# Patient Record
Sex: FEMALE | Race: WHITE | URBAN - METROPOLITAN AREA
[De-identification: names, ages, dates, MRNs, and addresses within clinical notes are randomized per-mention and may not be internally consistent; named-entity substitution may affect disease eponyms.]

---

## 2020-11-18 ENCOUNTER — VIRTUAL VISIT (OUTPATIENT)
Dept: INTERNAL MEDICINE | Age: 40
End: 2020-11-18

## 2020-11-18 DIAGNOSIS — Z20.822 EXPOSURE TO COVID-19 VIRUS: ICD-10-CM

## 2020-11-18 PROCEDURE — 99203 OFFICE O/P NEW LOW 30 MIN: CPT | Performed by: PHYSICIAN ASSISTANT

## 2020-11-21 LAB
SARS-COV-2: NOT DETECTED
SOURCE: NORMAL

## 2020-11-22 ASSESSMENT — ENCOUNTER SYMPTOMS
RESPIRATORY NEGATIVE: 1
SORE THROAT: 0
SINUS PRESSURE: 0
SHORTNESS OF BREATH: 0

## 2020-11-22 NOTE — PROGRESS NOTES
2020    TELEHEALTH EVALUATION -- Audio/Visual (During SBEVE-44 public health emergency)    Due to COVID 19 outbreak, patient's office visit was converted to a virtual visit. Patient was contacted and agreed to proceed with a virtual visit via [email or web address]  The risks and benefits of converting to a virtual visit were discussed in light of the current infectious disease epidemic. Patient also understood that insurance coverage and co-pays are up to their individual insurance plans. HPI:    Danielle Sera (:  1980) has requested an audio/video evaluation for the following concern(s):    Chief Complaint   Patient presents with    Concern For COVID-19       New patient, here after Covid exposure  She has been exposed at work, coworker tested positive   She had been riding in the car with her, no facemask or facial protection  Patient states no symptoms at this time      Review of Systems   Constitutional: Negative. Negative for chills, fatigue and fever. HENT: Negative. Negative for sinus pressure, sneezing and sore throat. Respiratory: Negative. Negative for shortness of breath. Musculoskeletal: Negative for myalgias. Prior to Visit Medications    Not on File       Social History     Tobacco Use    Smoking status: Current Every Day Smoker    Smokeless tobacco: Never Used   Substance Use Topics    Alcohol use: Not on file    Drug use: Not on file        Allergies not on file, History reviewed. No pertinent past medical history. , History reviewed. No pertinent surgical history. ,   Social History     Tobacco Use    Smoking status: Current Every Day Smoker    Smokeless tobacco: Never Used   Substance Use Topics    Alcohol use: Not on file    Drug use: Not on file       PHYSICAL EXAMINATION:  [ INSTRUCTIONS:  \"[x]\" Indicates a positive item  \"[]\" Indicates a negative item  -- DELETE ALL ITEMS NOT EXAMINED]  [x] Alert  [] Oriented to person/place/time    [x] No apparent distress  [] Toxic appearing    [] Face flushed appearing [] Sclera clear  [] Lips are cyanotic      [x] Breathing appears normal  [] Appears tachypneic      [] Rash on visible skin    [x] Cranial Nerves II-XII grossly intact    [x] Motor grossly intact in visible upper extremities    [x] Motor grossly intact in visible lower extremities    [x] Normal Mood  [] Anxious appearing    [] Depressed appearing  [] Confused appearing      [] Poor short term memory  [] Poor long term memory    [] OTHER:      Due to this being a TeleHealth encounter, evaluation of the following organ systems is limited: Vitals/Constitutional/EENT/Resp/CV/GI//MS/Neuro/Skin/Heme-Lymph-Imm. ASSESSMENT/PLAN:  1. Exposure to COVID-19 virus  - covid counseling   - KNZO-98 Ambulatory; Future  - f/u if needed   - quarantine as much as possible       No follow-ups on file. An  electronic signature was used to authenticate this note. --REYNALDO Magdaleno on 11/22/2020 at 1:28 PM        Pursuant to the emergency declaration under the [facility], 1135 waiver authority and the [employer] Act, this Virtual  Visit was conducted, with patient's consent, to reduce the patient's risk of exposure to COVID-19 and provide continuity of care for an established patient. Services were provided through a video synchronous discussion virtually to substitute for in-person clinic visit.

## 2021-09-14 ENCOUNTER — VIRTUAL VISIT (OUTPATIENT)
Dept: INTERNAL MEDICINE | Age: 41
End: 2021-09-14
Payer: COMMERCIAL

## 2021-09-14 DIAGNOSIS — B34.9 VIRAL ILLNESS: ICD-10-CM

## 2021-09-14 DIAGNOSIS — Z20.822 SUSPECTED COVID-19 VIRUS INFECTION: Primary | ICD-10-CM

## 2021-09-14 LAB
INFLUENZA A ANTIBODY: NORMAL
INFLUENZA B ANTIBODY: NORMAL

## 2021-09-14 PROCEDURE — 99213 OFFICE O/P EST LOW 20 MIN: CPT | Performed by: NURSE PRACTITIONER

## 2021-09-14 PROCEDURE — 87804 INFLUENZA ASSAY W/OPTIC: CPT | Performed by: NURSE PRACTITIONER

## 2021-09-14 RX ORDER — ONDANSETRON 4 MG/1
4 TABLET, ORALLY DISINTEGRATING ORAL EVERY 8 HOURS PRN
Qty: 15 TABLET | Refills: 0 | Status: SHIPPED | OUTPATIENT
Start: 2021-09-14 | End: 2021-09-19

## 2021-09-14 ASSESSMENT — ENCOUNTER SYMPTOMS
SORE THROAT: 0
BACK PAIN: 0
VOMITING: 1
RHINORRHEA: 0
WHEEZING: 0
CHEST TIGHTNESS: 0
COUGH: 1
DIARRHEA: 0
ABDOMINAL PAIN: 1
NAUSEA: 1
SHORTNESS OF BREATH: 0

## 2021-09-14 ASSESSMENT — PATIENT HEALTH QUESTIONNAIRE - PHQ9
SUM OF ALL RESPONSES TO PHQ9 QUESTIONS 1 & 2: 0
1. LITTLE INTEREST OR PLEASURE IN DOING THINGS: 0
2. FEELING DOWN, DEPRESSED OR HOPELESS: 0
SUM OF ALL RESPONSES TO PHQ QUESTIONS 1-9: 0

## 2021-09-14 NOTE — PROGRESS NOTES
Stephie Albert (:  1980) is a 36 y.o. female, Established patient, here for evaluation of the following chief complaint(s):  Concern For COVID-19 (Abdominal pain; Fatigue; x 1 day. )      No flowsheet data found. ASSESSMENT/PLAN:  1. Suspected COVID-19 virus infection  -     Covid-19 Ambulatory; Future  -     ondansetron (ZOFRAN ODT) 4 MG disintegrating tablet; Take 1 tablet by mouth every 8 hours as needed for Nausea or Vomiting, Disp-15 tablet, R-0Normal  2. Viral illness  -     POCT Influenza A/B  -     ondansetron (ZOFRAN ODT) 4 MG disintegrating tablet; Take 1 tablet by mouth every 8 hours as needed for Nausea or Vomiting, Disp-15 tablet, R-0Normal  - Self quarantine, only going out for Dr's appts/essentials  - OTC symptom control  - Continue to wear mask, social distance, and wash hands frequently  - Call 911 or go to the ER should symptoms become difficult to manage      Return if symptoms worsen or fail to improve. SUBJECTIVE/OBJECTIVE:    HPI     Symptoms started   Abd pain  Nausea  +emesis  Fatigue  +cough, but was smoker, quit x6 months ago  No shortness of breath  No runny nose  No stuffy nose  No sore throat  No headache  No loss of taste or smell  No known exposure      Review of Systems   Constitutional: Positive for fatigue. Negative for chills and fever. HENT: Negative for congestion, ear pain, rhinorrhea and sore throat. Respiratory: Positive for cough. Negative for chest tightness, shortness of breath and wheezing. Cardiovascular: Negative for chest pain, palpitations and leg swelling. Gastrointestinal: Positive for abdominal pain, nausea and vomiting. Negative for diarrhea. Genitourinary: Negative for dysuria. Musculoskeletal: Negative for arthralgias, back pain and myalgias. Neurological: Negative for dizziness, light-headedness and headaches.         No loss of taste or smell       Physical Exam  PHYSICAL EXAMINATION:  [ INSTRUCTIONS:  \"[x]\" Indicates a positive item  \"[]\" Indicates a negative item  -- DELETE ALL ITEMS NOT EXAMINED]    [x] Alert  [x] Oriented to person/place/time      [x] No apparent distress      [x] Breathing appears normal      [x] Normal Mood      [] Poor short term memory  [] Poor long term memory    [] OTHER:      Due to this being a TeleHealth encounter, evaluation of the following organ systems is limited: Vitals/Constitutional/EENT/Resp/CV/GI//MS/Neuro/Skin/Heme-Lymph-Imm. On this date 9/14/2021 I have spent 12 minutes reviewing previous notes, test results and face to face (virtual) with the patient discussing the diagnosis and importance of compliance with the treatment plan as well as documenting on the day of the visit. Sriram Andrade is a 36 y.o. female being evaluated by a Virtual Visit (video visit) encounter to address concerns as mentioned above. A caregiver was present when appropriate. Due to this being a TeleHealth encounter (During [address] emergency), evaluation of the following organ systems was limited: Vitals/Constitutional/EENT/Resp/CV/GI//MS/Neuro/Skin/Heme-Lymph-Imm. Pursuant to the emergency declaration under the [address] [place] [address] and the [employer] and [employer] Act, this Virtual Visit was conducted with patient's (and/or legal guardian's) consent, to reduce the patient's risk of exposure to COVID-19 and provide necessary medical care. The patient (and/or legal guardian) has also been advised to contact this office for worsening conditions or problems, and seek emergency medical treatment and/or call 911 if deemed necessary. Patient identification was verified at the start of the visit: Yes    Services were provided through a video synchronous discussion virtually to substitute for in-person clinic visit. Patient was located at home and provider was located in office or at home.      An electronic signature was used to authenticate this note.     --Jewel Bowers, APRN

## 2021-09-14 NOTE — PATIENT INSTRUCTIONS
Patient Education        Coronavirus (FCJBG-44): Care Instructions  Overview  The coronavirus disease (COVID-19) is caused by a virus. Symptoms may include a fever, a cough, and shortness of breath. It mainly spreads person-to-person through droplets from coughing and sneezing. The virus also can spread when people are in close contact with someone who is infected. Most people have mild symptoms and can take care of themselves at home. If their symptoms get worse, they may need care in a hospital. Treatment may include medicines to reduce symptoms, plus breathing support such as oxygen therapy or a ventilator. It's important to not spread the virus to others. If you have COVID-19, wear a face cover anytime you are around other people. It can help stop the spread of the virus. You need to isolate yourself while you are sick. Leave your home only if you need to get medical care or testing. Follow-up care is a key part of your treatment and safety. Be sure to make and go to all appointments, and call your doctor if you are having problems. It's also a good idea to know your test results and keep a list of the medicines you take. How can you care for yourself at home? · Get extra rest. It can help you feel better. · Drink plenty of fluids. This helps replace fluids lost from fever. Fluids also help ease a scratchy throat. Water, soup, fruit juice, and hot tea with lemon are good choices. · Take acetaminophen (such as Tylenol) to reduce a fever. It may also help with muscle aches. Read and follow all instructions on the label. · Use petroleum jelly on sore skin. This can help if the skin around your nose and lips becomes sore from rubbing a lot with tissues. If you use oxygen, use a water-based product instead of petroleum jelly. Tips for self-isolation  · Limit contact with people in your home. If possible, stay in a separate bedroom and use a separate bathroom.   · Wear a cloth face cover when you are around other people. It can help stop the spread of the virus when you cough or sneeze. · If you have to leave home, avoid crowds and try to stay at least 6 feet away from other people. · Avoid contact with pets and other animals. · Cover your mouth and nose with a tissue when you cough or sneeze. Then throw it in the trash right away. · Wash your hands often, especially after you cough or sneeze. Use soap and water, and scrub for at least 20 seconds. If soap and water aren't available, use an alcohol-based hand . · Don't share personal household items. These include bedding, towels, cups and glasses, and eating utensils. · [address] in the warmest water allowed for the fabric type, and dry it completely. It's okay to wash other people's laundry with yours. · Clean and disinfect your home every day. Use household  and disinfectant wipes or sprays. Take special care to clean things that you grab with your hands. These include doorknobs, remote controls, phones, and handles on your refrigerator and microwave. And don't forget countertops, tabletops, bathrooms, and computer keyboards. When you can end self-isolation  · If you know or suspect that you have COVID-19, stay in self-isolation until:  ? You haven't had a fever for 24 hours while not taking medicines to lower the fever, and  ? Your symptoms have improved, and  ? It's been at least 10 days since your symptoms started. · Talk to your doctor about whether you also need testing, especially if you have a weakened immune system. When should you call for help? Call 911 anytime you think you may need emergency care. For example, call if you have life-threatening symptoms, such as:    · You have severe trouble breathing.  (You can't talk at all.)     · You have constant chest pain or pressure.     · You are severely dizzy or lightheaded.     · You are confused or can't think clearly.     · Your face and lips have a blue color.     · You pass out (lose consciousness) or are very hard to wake up. Call your doctor now or seek immediate medical care if:    · You have moderate trouble breathing. (You can't speak a full sentence.)     · You are coughing up blood (more than about 1 teaspoon).     · You have signs of low blood pressure. These include feeling lightheaded; being too weak to stand; and having cold, pale, clammy skin. Watch closely for changes in your health, and be sure to contact your doctor if:    · Your symptoms get worse.     · You are not getting better as expected. Call before you go to the doctor's office. Follow their instructions. And wear a cloth face cover. Current as of: March 26, 2021               Content Version: 12.9  © 2006-2021 [employer]. Care instructions adapted under license by [facility] ([facility]). If you have questions about a medical condition or this instruction, always ask your healthcare professional. [place] [address] any warranty or liability for your use of this information.

## 2021-12-20 ENCOUNTER — VIRTUAL VISIT (OUTPATIENT)
Dept: INTERNAL MEDICINE | Age: 41
End: 2021-12-20
Payer: COMMERCIAL

## 2021-12-20 DIAGNOSIS — B34.9 VIRAL ILLNESS: ICD-10-CM

## 2021-12-20 DIAGNOSIS — U07.1 COVID-19: Primary | ICD-10-CM

## 2021-12-20 LAB
Lab: ABNORMAL
PERFORMING INSTRUMENT: ABNORMAL
QC PASS/FAIL: ABNORMAL
SARS-COV-2, POC: DETECTED

## 2021-12-20 PROCEDURE — G8427 DOCREV CUR MEDS BY ELIG CLIN: HCPCS | Performed by: NURSE PRACTITIONER

## 2021-12-20 PROCEDURE — 99213 OFFICE O/P EST LOW 20 MIN: CPT | Performed by: NURSE PRACTITIONER

## 2021-12-20 PROCEDURE — 87426 SARSCOV CORONAVIRUS AG IA: CPT | Performed by: NURSE PRACTITIONER

## 2021-12-20 SDOH — ECONOMIC STABILITY: FOOD INSECURITY: WITHIN THE PAST 12 MONTHS, THE FOOD YOU BOUGHT JUST DIDN'T LAST AND YOU DIDN'T HAVE MONEY TO GET MORE.: NEVER TRUE

## 2021-12-20 SDOH — ECONOMIC STABILITY: FOOD INSECURITY: WITHIN THE PAST 12 MONTHS, YOU WORRIED THAT YOUR FOOD WOULD RUN OUT BEFORE YOU GOT MONEY TO BUY MORE.: NEVER TRUE

## 2021-12-20 ASSESSMENT — ENCOUNTER SYMPTOMS
SHORTNESS OF BREATH: 1
VOMITING: 1
CHEST TIGHTNESS: 0
SORE THROAT: 0
NAUSEA: 1
DIARRHEA: 1
WHEEZING: 0
RHINORRHEA: 0
ABDOMINAL PAIN: 0
BACK PAIN: 0
COUGH: 1

## 2021-12-20 ASSESSMENT — SOCIAL DETERMINANTS OF HEALTH (SDOH): HOW HARD IS IT FOR YOU TO PAY FOR THE VERY BASICS LIKE FOOD, HOUSING, MEDICAL CARE, AND HEATING?: NOT HARD AT ALL

## 2021-12-20 NOTE — PROGRESS NOTES
Lexi Mansfield (:  1980) is a 36 y.o. female, Established patient, here for evaluation of the following chief complaint(s):  Concern For COVID-19 (loss of taste/smell, headache, body aches )      No flowsheet data found. ASSESSMENT/PLAN:  1. COVID-19  - Self quarantine, only going out for Dr's appts/essentials  - OTC symptom control  - Continue to wear mask, social distance, and wash hands frequently  - Call 911 or go to the ER should symptoms become difficult to manage  -  May RTW . Pt did not need note, office shut down d/t covid cases  2. Viral illness  -     POCT COVID-19, Antigen        Return if symptoms worsen or fail to improve. SUBJECTIVE/OBJECTIVE:    HPI    Symptoms started   Exposure to covid, a lot of co-workers out with covid  Ibuprofen with no relief        Review of Systems   Constitutional: Positive for appetite change. Negative for chills, diaphoresis, fatigue and fever. HENT: Positive for congestion. Negative for ear pain, rhinorrhea and sore throat. Respiratory: Positive for cough (productive) and shortness of breath. Negative for chest tightness and wheezing. Cardiovascular: Negative for chest pain, palpitations and leg swelling. Gastrointestinal: Positive for diarrhea, nausea and vomiting. Negative for abdominal pain. Genitourinary: Negative for dysuria. Musculoskeletal: Positive for myalgias. Negative for arthralgias and back pain. Neurological: Positive for headaches. Negative for dizziness and light-headedness.         Loss of taste and smell     Physical Exam  PHYSICAL EXAMINATION:  [ INSTRUCTIONS:  \"[x]\" Indicates a positive item  \"[]\" Indicates a negative item  -- DELETE ALL ITEMS NOT EXAMINED]    [x] Alert  [x] Oriented to person/place/time      [x] No apparent distress      [x] Breathing appears normal      [x] Normal Mood      [] Poor short term memory  [] Poor long term memory    [] OTHER:      Due to this being a TeleHealth encounter, evaluation of the following organ systems is limited: Vitals/Constitutional/EENT/Resp/CV/GI//MS/Neuro/Skin/Heme-Lymph-Imm. On this date 12/20/2021 I have spent 11 minutes reviewing previous notes, test results and face to face (virtual) with the patient discussing the diagnosis and importance of compliance with the treatment plan as well as documenting on the day of the visit. Abhijit Myrick is a 36 y.o. female being evaluated by a Virtual Visit (video visit) encounter to address concerns as mentioned above. A caregiver was present when appropriate. Due to this being a TeleHealth encounter (During CVO-17 public health emergency), evaluation of the following organ systems was limited: Vitals/Constitutional/EENT/Resp/CV/GI//MS/Neuro/Skin/Heme-Lymph-Imm. Pursuant to the emergency declaration under the [address] [place] [address], [address] [place] [address] and the [employer] and [employer] Act, this Virtual Visit was conducted with patient's (and/or legal guardian's) consent, to reduce the patient's risk of exposure to COVID-19 and provide necessary medical care. The patient (and/or legal guardian) has also been advised to contact this office for worsening conditions or problems, and seek emergency medical treatment and/or call 911 if deemed necessary. Patient identification was verified at the start of the visit: Yes    Services were provided through a video synchronous discussion virtually to substitute for in-person clinic visit. Patient was located at home and provider was located in office or at home. An electronic signature was used to authenticate this note.     --ABY Spangler

## 2021-12-20 NOTE — PATIENT INSTRUCTIONS
Patient Education        Learning About Coronavirus [phone])  What is coronavirus (COVID-19)? COVID-19 is a disease caused by a type of coronavirus. This illness was first found in December 2019. It has since spread worldwide. Coronaviruses are a large group of viruses. They cause the common cold. They also cause more serious illnesses like [place] respiratory syndrome (MERS) and severe acute respiratory syndrome (SARS). COVID-19 is caused by a novel coronavirus. That means it's a new type that has not been seen in people before. What are the symptoms? COVID-19 symptoms may include:  · Fever. · Cough. · Trouble breathing. · Chills or repeated shaking with chills. · Muscle and body aches. · Headache. · Sore throat. · New loss of taste or smell. · Vomiting. · Diarrhea. In severe cases, COVID-19 can cause pneumonia and make it hard to breathe without help from a machine. It can cause death. How is it diagnosed? COVID-19 is diagnosed with a viral test. This may also be called a PCR test or antigen test. It looks for evidence of the virus in your breathing passages or lungs (respiratory system). The test is most often done on a sample from the nose, throat, or lungs. It's sometimes done on a sample of saliva. One way a sample is collected is by putting a long swab into the back of your nose. How is it treated? Mild cases of COVID-19 can be treated at home. Serious cases need treatment in the hospital. Treatment may include medicines to reduce symptoms, plus breathing support such as oxygen therapy or a ventilator. Some people may be placed on their belly to help their oxygen levels. Treatments that may help people who have COVID-19 include:  Antiviral medicines. These medicines treat viral infections. Remdesivir is an example. Immune-based therapy. These medicines help the immune system fight COVID-19. Examples include monoclonal antibodies. Blood thinners.    These medicines help prevent blood clots. People with severe illness are at risk for blood clots. How can you protect yourself and others? The best way to protect yourself from getting sick is to:  · Get vaccinated. · Avoid sick people. · If you are not fully vaccinated:  ? Wear a mask if you have to go to public areas. ? Avoid crowds and try to stay at least 6 feet away from other people. · Cover your mouth with a tissue when you cough or sneeze. · Wash your hands often, especially after you cough or sneeze. Use soap and water, and scrub for at least 20 seconds. If soap and water aren't available, use an alcohol-based hand . · Avoid touching your mouth, nose, and eyes. To help avoid spreading the virus to others:  · Get vaccinated. · Cover your mouth with a tissue when you cough or sneeze. · Wash your hands often, especially after you cough or sneeze. Use soap and water, and scrub for at least 20 seconds. If soap and water aren't available, use an alcohol-based hand . · If you have been exposed to the virus and are not fully vaccinated:  ? Stay home. Don't go to school, work, or public areas. And don't use public transportation, ride-shares, or taxis unless you have no choice. ? Wear a mask if you have to go to public areas, like the pharmacy. · If you're sick:  ? Leave your home only if you need to get medical care. But call the doctor's office first so they know you're coming. And wear a mask. ? Wear a mask whenever you're around other people. ? Limit contact with pets and people in your home. If possible, stay in a separate bedroom and use a separate bathroom. ? Clean and disinfect your home every day. Use household  and disinfectant wipes or sprays. Take special care to clean things that you touch with your hands. How can you self-isolate when you have COVID-19? If you have COVID-19, there are things you can do to help avoid spreading the virus to others.   · Limit contact with people in your home. If possible, stay in a separate bedroom and use a separate bathroom. · Wear a mask when you are around other people. · If you have to leave home, avoid crowds and try to stay at least 6 feet away from other people. · Avoid contact with pets and other animals. · Cover your mouth and nose with a tissue when you cough or sneeze. Then throw it in the trash right away. · Wash your hands often, especially after you cough or sneeze. Use soap and water, and scrub for at least 20 seconds. If soap and water aren't available, use an alcohol-based hand . · Don't share personal household items. These include bedding, towels, cups and glasses, and eating utensils. · [address] in the warmest water allowed for the fabric type, and dry it completely. It's okay to wash other people's laundry with yours. · Clean and disinfect your home. Use household  and disinfectant wipes or sprays. When should you call for help? Call 911 anytime you think you may need emergency care. For example, call if you have life-threatening symptoms, such as:    · You have severe trouble breathing. (You can't talk at all.)     · You have constant chest pain or pressure.     · You are severely dizzy or lightheaded.     · You are confused or can't think clearly.     · You have pale, gray, or blue-colored skin or lips.     · You pass out (lose consciousness) or are very hard to wake up. Call your doctor now or seek immediate medical care if:    · You have moderate trouble breathing. (You can't speak a full sentence.)     · You are coughing up blood (more than about 1 teaspoon).     · You have signs of low blood pressure. These include feeling lightheaded; being too weak to stand; and having cold, pale, clammy skin.    Watch closely for changes in your health, and be sure to contact your doctor if:    · Your symptoms get worse.     · You are not getting better as expected.     · You have new or worse symptoms of anxiety, depression, nightmares, or flashbacks. Call before you go to the doctor's office. Follow their instructions. And wear a mask. Current as of: July 1, 2021               Content Version: 13.0  © 2006-2021 [employer]. Care instructions adapted under license by [facility] ([facility]). If you have questions about a medical condition or this instruction, always ask your healthcare professional. [place] [address] any warranty or liability for your use of this information. Patient Education        Coronavirus (HHQJQ-20): Care Instructions  Overview  The coronavirus disease (COVID-19) is caused by a virus. Symptoms may include a fever, a cough, and shortness of breath. It can spread through droplets from coughing and sneezing, breathing, and singing. The virus also can spread when people are in close contact with someone who is infected. Most people have mild symptoms and can take care of themselves at home. If their symptoms get worse, they may need care in a hospital. Treatment may include medicines to reduce symptoms, plus breathing support such as oxygen therapy or a ventilator. It's important to not spread the virus to others. If you have COVID-19, wear a mask anytime you are around other people. It can help stop the spread of the virus. You need to isolate yourself while you are sick. Leave your home only if you need to get medical care or testing. Follow-up care is a key part of your treatment and safety. Be sure to make and go to all appointments, and call your doctor if you are having problems. It's also a good idea to know your test results and keep a list of the medicines you take. How can you care for yourself at home? · Get extra rest. It can help you feel better. · Drink plenty of fluids. This helps replace fluids lost from fever. Fluids may also help ease a scratchy throat. · You can take acetaminophen (Tylenol) or ibuprofen (Advil, Motrin) to reduce a fever.  It may also help with muscle and body aches. Read and follow all instructions on the label. · Use petroleum jelly on sore skin. This can help if the skin around your nose and lips becomes sore from rubbing a lot with tissues. If you use oxygen, use a water-based product instead of petroleum jelly. · Keep track of symptoms such as fever and shortness of breath. This can help you know if you need to call your doctor. It can also help you know when it's safe to be around other people. · In some cases, your doctor might suggest that you get a pulse oximeter. How can you self-isolate when you have COVID-19? If you have COVID-19, there are things you can do to help avoid spreading the virus to others. · Limit contact with people in your home. If possible, stay in a separate bedroom and use a separate bathroom. · Wear a mask when you are around other people. · If you have to leave home, avoid crowds and try to stay at least 6 feet away from other people. · Avoid contact with pets and other animals. · Cover your mouth and nose with a tissue when you cough or sneeze. Then throw it in the trash right away. · Wash your hands often, especially after you cough or sneeze. Use soap and water, and scrub for at least 20 seconds. If soap and water aren't available, use an alcohol-based hand . · Don't share personal household items. These include bedding, towels, cups and glasses, and eating utensils. · [address] in the warmest water allowed for the fabric type, and dry it completely. It's okay to wash other people's laundry with yours. · Clean and disinfect your home. Use household  and disinfectant wipes or sprays. When can you end self-isolation for COVID-19? If you know or think that you have the virus, you will need to self-isolate.  You can be around others after:  · It's been at least 10 days since your symptoms started and  · You haven't had a fever for 24 hours without taking medicines to lower the fever and  · Your symptoms are improving. If you tested positive but have no symptoms, you can end isolation after 10 days. But if you start to have symptoms, follow the steps above. Ask your doctor if you need to be tested before you end isolation. This is especially important if you have a weakened immune system. When should you call for help? Call 911 anytime you think you may need emergency care. For example, call if you have life-threatening symptoms, such as:    · You have severe trouble breathing. (You can't talk at all.)     · You have constant chest pain or pressure.     · You are severely dizzy or lightheaded.     · You are confused or can't think clearly.     · You have pale, gray, or blue-colored skin or lips.     · You pass out (lose consciousness) or are very hard to wake up. Call your doctor now or seek immediate medical care if:    · You have moderate trouble breathing. (You can't speak a full sentence.)     · You are coughing up blood (more than about 1 teaspoon).     · You have signs of low blood pressure. These include feeling lightheaded; being too weak to stand; and having cold, pale, clammy skin. Watch closely for changes in your health, and be sure to contact your doctor if:    · Your symptoms get worse.     · You are not getting better as expected.     · You have new or worse symptoms of anxiety, depression, nightmares, or flashbacks. Call before you go to the doctor's office. Follow their instructions. And wear a mask. Current as of: July 1, 2021               Content Version: 13.0  © 2006-2021 [employer]. Care instructions adapted under license by [facility] ([facility]). If you have questions about a medical condition or this instruction, always ask your healthcare professional. [place] [address] any warranty or liability for your use of this information.

## 2024-04-20 ENCOUNTER — HOSPITAL ENCOUNTER (INPATIENT)
Age: 44
LOS: 10 days | Discharge: HOME | DRG: 885 | End: 2024-04-30
Attending: PSYCHIATRY & NEUROLOGY | Admitting: PSYCHIATRY & NEUROLOGY
Payer: COMMERCIAL

## 2024-04-20 DIAGNOSIS — E05.90 HYPERTHYROIDISM: ICD-10-CM

## 2024-04-20 DIAGNOSIS — F32.2 CURRENT SEVERE EPISODE OF MAJOR DEPRESSIVE DISORDER WITHOUT PSYCHOTIC FEATURES WITHOUT PRIOR EPISODE (HCC): Primary | ICD-10-CM

## 2024-04-20 PROBLEM — F32.A DEPRESSION, UNSPECIFIED: Status: ACTIVE | Noted: 2024-04-20

## 2024-04-20 LAB
ALBUMIN SERPL-MCNC: 4.4 G/DL (ref 3.5–4.6)
ALP SERPL-CCNC: 108 U/L (ref 40–130)
ALT SERPL-CCNC: 29 U/L (ref 0–33)
AMPHET UR QL SCN: ABNORMAL
ANION GAP SERPL CALCULATED.3IONS-SCNC: 20 MEQ/L (ref 9–15)
APAP SERPL-MCNC: <5 UG/ML (ref 10–30)
AST SERPL-CCNC: 19 U/L (ref 0–35)
BARBITURATES UR QL SCN: ABNORMAL
BASOPHILS # BLD: 0 K/UL (ref 0–0.2)
BASOPHILS NFR BLD: 0.2 %
BENZODIAZ UR QL SCN: ABNORMAL
BILIRUB SERPL-MCNC: 0.3 MG/DL (ref 0.2–0.7)
BUN SERPL-MCNC: 12 MG/DL (ref 6–20)
CALCIUM SERPL-MCNC: 9.6 MG/DL (ref 8.5–9.9)
CANNABINOIDS UR QL SCN: POSITIVE
CHLORIDE SERPL-SCNC: 105 MEQ/L (ref 95–107)
CHOLEST SERPL-MCNC: 177 MG/DL (ref 0–199)
CK SERPL-CCNC: 54 U/L (ref 0–170)
CO2 SERPL-SCNC: 16 MEQ/L (ref 20–31)
COCAINE UR QL SCN: ABNORMAL
CREAT SERPL-MCNC: 0.3 MG/DL (ref 0.5–0.9)
DRUG SCREEN COMMENT UR-IMP: ABNORMAL
EOSINOPHIL # BLD: 0 K/UL (ref 0–0.7)
EOSINOPHIL NFR BLD: 0.1 %
ERYTHROCYTE [DISTWIDTH] IN BLOOD BY AUTOMATED COUNT: 13.1 % (ref 11.5–14.5)
ETHANOL PERCENT: 0.03 G/DL
ETHANOLAMINE SERPL-MCNC: 34 MG/DL (ref 0–0.08)
FENTANYL SCREEN, URINE: ABNORMAL
GLOBULIN SER CALC-MCNC: 3.5 G/DL (ref 2.3–3.5)
GLUCOSE SERPL-MCNC: 109 MG/DL (ref 70–99)
HCG UR QL: NEGATIVE
HCT VFR BLD AUTO: 42.7 % (ref 37–47)
HDLC SERPL-MCNC: 68 MG/DL (ref 40–59)
HGB BLD-MCNC: 14.3 G/DL (ref 12–16)
LDLC SERPL CALC-MCNC: 92 MG/DL (ref 0–129)
LYMPHOCYTES # BLD: 2 K/UL (ref 1–4.8)
LYMPHOCYTES NFR BLD: 21.3 %
MCH RBC QN AUTO: 25.8 PG (ref 27–31.3)
MCHC RBC AUTO-ENTMCNC: 33.5 % (ref 33–37)
MCV RBC AUTO: 77.1 FL (ref 79.4–94.8)
METHADONE UR QL SCN: ABNORMAL
MONOCYTES # BLD: 0.4 K/UL (ref 0.2–0.8)
MONOCYTES NFR BLD: 4.8 %
NEUTROPHILS # BLD: 6.7 K/UL (ref 1.4–6.5)
NEUTS SEG NFR BLD: 73.4 %
OPIATES UR QL SCN: ABNORMAL
OXYCODONE UR QL SCN: ABNORMAL
PCP UR QL SCN: ABNORMAL
PLATELET # BLD AUTO: 302 K/UL (ref 130–400)
POTASSIUM SERPL-SCNC: 4.4 MEQ/L (ref 3.4–4.9)
PROPOXYPH UR QL SCN: ABNORMAL
PROT SERPL-MCNC: 7.9 G/DL (ref 6.3–8)
RBC # BLD AUTO: 5.54 M/UL (ref 4.2–5.4)
SALICYLATES SERPL-MCNC: <0.3 MG/DL (ref 15–30)
SODIUM SERPL-SCNC: 141 MEQ/L (ref 135–144)
TRIGL SERPL-MCNC: 83 MG/DL (ref 0–150)
TSH SERPL-MCNC: <0.01 UIU/ML (ref 0.44–3.86)
WBC # BLD AUTO: 9.1 K/UL (ref 4.8–10.8)

## 2024-04-20 PROCEDURE — 6370000000 HC RX 637 (ALT 250 FOR IP): Performed by: PHYSICIAN ASSISTANT

## 2024-04-20 PROCEDURE — 80143 DRUG ASSAY ACETAMINOPHEN: CPT

## 2024-04-20 PROCEDURE — 80307 DRUG TEST PRSMV CHEM ANLYZR: CPT

## 2024-04-20 PROCEDURE — 1240000000 HC EMOTIONAL WELLNESS R&B

## 2024-04-20 PROCEDURE — 82077 ASSAY SPEC XCP UR&BREATH IA: CPT

## 2024-04-20 PROCEDURE — 84443 ASSAY THYROID STIM HORMONE: CPT

## 2024-04-20 PROCEDURE — 36415 COLL VENOUS BLD VENIPUNCTURE: CPT

## 2024-04-20 PROCEDURE — 80061 LIPID PANEL: CPT

## 2024-04-20 PROCEDURE — 80179 DRUG ASSAY SALICYLATE: CPT

## 2024-04-20 PROCEDURE — 84703 CHORIONIC GONADOTROPIN ASSAY: CPT

## 2024-04-20 PROCEDURE — 82550 ASSAY OF CK (CPK): CPT

## 2024-04-20 PROCEDURE — 83036 HEMOGLOBIN GLYCOSYLATED A1C: CPT

## 2024-04-20 PROCEDURE — 85025 COMPLETE CBC W/AUTO DIFF WBC: CPT

## 2024-04-20 PROCEDURE — 99285 EMERGENCY DEPT VISIT HI MDM: CPT

## 2024-04-20 PROCEDURE — 6370000000 HC RX 637 (ALT 250 FOR IP): Performed by: PSYCHIATRY & NEUROLOGY

## 2024-04-20 PROCEDURE — 80053 COMPREHEN METABOLIC PANEL: CPT

## 2024-04-20 RX ORDER — MAGNESIUM HYDROXIDE/ALUMINUM HYDROXICE/SIMETHICONE 120; 1200; 1200 MG/30ML; MG/30ML; MG/30ML
30 SUSPENSION ORAL PRN
Status: DISCONTINUED | OUTPATIENT
Start: 2024-04-20 | End: 2024-04-30 | Stop reason: HOSPADM

## 2024-04-20 RX ORDER — HYDROXYZINE HYDROCHLORIDE 50 MG/ML
50 INJECTION, SOLUTION INTRAMUSCULAR EVERY 6 HOURS PRN
Status: DISCONTINUED | OUTPATIENT
Start: 2024-04-20 | End: 2024-04-30 | Stop reason: HOSPADM

## 2024-04-20 RX ORDER — HALOPERIDOL 5 MG/ML
5 INJECTION INTRAMUSCULAR EVERY 6 HOURS PRN
Status: DISCONTINUED | OUTPATIENT
Start: 2024-04-20 | End: 2024-04-30 | Stop reason: HOSPADM

## 2024-04-20 RX ORDER — HALOPERIDOL 5 MG/1
5 TABLET ORAL EVERY 6 HOURS PRN
Status: DISCONTINUED | OUTPATIENT
Start: 2024-04-20 | End: 2024-04-30 | Stop reason: HOSPADM

## 2024-04-20 RX ORDER — ACETAMINOPHEN 325 MG/1
650 TABLET ORAL EVERY 4 HOURS PRN
Status: DISCONTINUED | OUTPATIENT
Start: 2024-04-20 | End: 2024-04-30 | Stop reason: HOSPADM

## 2024-04-20 RX ORDER — TRAZODONE HYDROCHLORIDE 50 MG/1
50 TABLET ORAL NIGHTLY PRN
Status: DISCONTINUED | OUTPATIENT
Start: 2024-04-20 | End: 2024-04-30 | Stop reason: HOSPADM

## 2024-04-20 RX ORDER — BENZTROPINE MESYLATE 1 MG/ML
2 INJECTION INTRAMUSCULAR; INTRAVENOUS 2 TIMES DAILY PRN
Status: DISCONTINUED | OUTPATIENT
Start: 2024-04-20 | End: 2024-04-30 | Stop reason: HOSPADM

## 2024-04-20 RX ORDER — ACETAMINOPHEN 325 MG/1
650 TABLET ORAL ONCE
Status: COMPLETED | OUTPATIENT
Start: 2024-04-20 | End: 2024-04-20

## 2024-04-20 RX ORDER — HYDROXYZINE PAMOATE 50 MG/1
50 CAPSULE ORAL EVERY 6 HOURS PRN
Status: DISCONTINUED | OUTPATIENT
Start: 2024-04-20 | End: 2024-04-30 | Stop reason: HOSPADM

## 2024-04-20 RX ADMIN — HYDROXYZINE PAMOATE 50 MG: 50 CAPSULE ORAL at 14:47

## 2024-04-20 RX ADMIN — ACETAMINOPHEN 650 MG: 325 TABLET ORAL at 09:30

## 2024-04-20 RX ADMIN — TRAZODONE HYDROCHLORIDE 50 MG: 50 TABLET ORAL at 21:10

## 2024-04-20 ASSESSMENT — PATIENT HEALTH QUESTIONNAIRE - PHQ9
SUM OF ALL RESPONSES TO PHQ QUESTIONS 1-9: 27
SUM OF ALL RESPONSES TO PHQ QUESTIONS 1-9: 24
SUM OF ALL RESPONSES TO PHQ9 QUESTIONS 1 & 2: 6
3. TROUBLE FALLING OR STAYING ASLEEP: NEARLY EVERY DAY
5. POOR APPETITE OR OVEREATING: NEARLY EVERY DAY
4. FEELING TIRED OR HAVING LITTLE ENERGY: NEARLY EVERY DAY
1. LITTLE INTEREST OR PLEASURE IN DOING THINGS: NEARLY EVERY DAY
SUM OF ALL RESPONSES TO PHQ QUESTIONS 1-9: 27
8. MOVING OR SPEAKING SO SLOWLY THAT OTHER PEOPLE COULD HAVE NOTICED. OR THE OPPOSITE, BEING SO FIGETY OR RESTLESS THAT YOU HAVE BEEN MOVING AROUND A LOT MORE THAN USUAL: NEARLY EVERY DAY
9. THOUGHTS THAT YOU WOULD BE BETTER OFF DEAD, OR OF HURTING YOURSELF: NEARLY EVERY DAY
7. TROUBLE CONCENTRATING ON THINGS, SUCH AS READING THE NEWSPAPER OR WATCHING TELEVISION: NEARLY EVERY DAY
SUM OF ALL RESPONSES TO PHQ QUESTIONS 1-9: 27
6. FEELING BAD ABOUT YOURSELF - OR THAT YOU ARE A FAILURE OR HAVE LET YOURSELF OR YOUR FAMILY DOWN: NEARLY EVERY DAY
2. FEELING DOWN, DEPRESSED OR HOPELESS: NEARLY EVERY DAY
10. IF YOU CHECKED OFF ANY PROBLEMS, HOW DIFFICULT HAVE THESE PROBLEMS MADE IT FOR YOU TO DO YOUR WORK, TAKE CARE OF THINGS AT HOME, OR GET ALONG WITH OTHER PEOPLE: EXTREMELY DIFFICULT

## 2024-04-20 ASSESSMENT — PAIN - FUNCTIONAL ASSESSMENT
PAIN_FUNCTIONAL_ASSESSMENT: NONE - DENIES PAIN
PAIN_FUNCTIONAL_ASSESSMENT: NONE - DENIES PAIN

## 2024-04-20 ASSESSMENT — PAIN DESCRIPTION - LOCATION: LOCATION: HEAD

## 2024-04-20 ASSESSMENT — LIFESTYLE VARIABLES
HOW MANY STANDARD DRINKS CONTAINING ALCOHOL DO YOU HAVE ON A TYPICAL DAY: 7 TO 9
HOW OFTEN DO YOU HAVE A DRINK CONTAINING ALCOHOL: 2-3 TIMES A WEEK

## 2024-04-20 ASSESSMENT — SLEEP AND FATIGUE QUESTIONNAIRES
AVERAGE NUMBER OF SLEEP HOURS: 3
DO YOU HAVE DIFFICULTY SLEEPING: YES
SLEEP PATTERN: DIFFICULTY FALLING ASLEEP;DISTURBED/INTERRUPTED SLEEP;NIGHTMARES/TERRORS;INSOMNIA
DO YOU USE A SLEEP AID: YES

## 2024-04-20 ASSESSMENT — PAIN SCALES - GENERAL: PAINLEVEL_OUTOF10: 5

## 2024-04-20 NOTE — ED TRIAGE NOTES
Pt reports she is currently SI with a plan of \"shooting myself in the head \"  -denies any chest pain - denies SOB - denies N&V - Ax4 - denies numbness tingling - pt states she was locked in the room with her gun and wrote a suicide note - pt states she changed her mid  and went for a walk -  pulled up and took her to her house and spoke to her boyfriend and EMS was called - pt requesting \"help\"

## 2024-04-20 NOTE — ED PROVIDER NOTES
[facility] ED  eMERGENCY dEPARTMENTeNCOUnter      Pt Name: Shrtuhi Patel  MRN: [id number]  Birthdate 1980  Date ofevaluation: 4/20/2024  Provider: Lucia Fonseca PA-C    CHIEF COMPLAINT       Chief Complaint   Patient presents with    Suicidal     Pt reports she is currently SI with a plan of \"shooting myself in the head \"  -denies any chest pain - denies SOB - denies N&V - Ax4 - denies numbness tingling - pt states she was locked in the room with her gun and wrote a suicide note - pt states she changed her mid  and went for a walk -  pulled up and took her to her house and spoke to her boyfriend and EMS was called - pt requesting \"help\"          HISTORY OF PRESENT ILLNESS   (Location/Symptom, Timing/Onset,Context/Setting, Quality, Duration, Modifying Factors, Severity)  Note limiting factors.   Shruthi Patel is a 43 y.o. female who presents to the emergency department suicidal ideation.  Patient comes from home no diagnosed psychiatric history but feels that she has underlying depression and anxiety.  Last night she had a gun to her head wanting to kill herself after she heard her significant other and daughter to poorly about her.  She even wrote a note.  She ended up walking away and then family called when they went into the room and see the note.  No known psychiatric history.  Patient states she does feel anxious and shaky.  She does endorse having 2 alcoholic beverages last night.  She has no medical complaints today.    HPI    NursingNotes were reviewed.    REVIEW OF SYSTEMS    (2-9 systems for level 4, 10 or more for level 5)     Review of Systems   Constitutional:  Negative for chills, diaphoresis, fatigue and fever.   HENT:  Negative for congestion, rhinorrhea and sore throat.    Eyes:  Negative for photophobia and pain.   Respiratory:  Negative for cough and shortness of breath.    Cardiovascular:  Negative for chest pain and palpitations.   Gastrointestinal:  Negative for
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
## 2024-04-21 PROBLEM — F32.A DEPRESSION, UNSPECIFIED: Status: RESOLVED | Noted: 2024-04-20 | Resolved: 2024-04-21

## 2024-04-21 PROBLEM — F31.63 SEVERE MIXED BIPOLAR DISORDER (HCC): Status: ACTIVE | Noted: 2024-04-21

## 2024-04-21 LAB
ESTIMATED AVERAGE GLUCOSE: 103 MG/DL
HBA1C MFR BLD: 5.2 % (ref 4–6)
T3 FREE: 22.7 PG/ML (ref 2–4.4)
T4 FREE SERPL-MCNC: 6.17 NG/DL (ref 0.84–1.68)

## 2024-04-21 PROCEDURE — 84439 ASSAY OF FREE THYROXINE: CPT

## 2024-04-21 PROCEDURE — 36415 COLL VENOUS BLD VENIPUNCTURE: CPT

## 2024-04-21 PROCEDURE — 6370000000 HC RX 637 (ALT 250 FOR IP): Performed by: NURSE PRACTITIONER

## 2024-04-21 PROCEDURE — 1240000000 HC EMOTIONAL WELLNESS R&B

## 2024-04-21 PROCEDURE — 6370000000 HC RX 637 (ALT 250 FOR IP): Performed by: PSYCHIATRY & NEUROLOGY

## 2024-04-21 PROCEDURE — 93005 ELECTROCARDIOGRAM TRACING: CPT | Performed by: PSYCHIATRY & NEUROLOGY

## 2024-04-21 PROCEDURE — 84481 FREE ASSAY (FT-3): CPT

## 2024-04-21 RX ORDER — PRAZOSIN HYDROCHLORIDE 1 MG/1
1 CAPSULE ORAL NIGHTLY
Status: DISCONTINUED | OUTPATIENT
Start: 2024-04-21 | End: 2024-04-27

## 2024-04-21 RX ORDER — DIVALPROEX SODIUM 250 MG/1
250 TABLET, DELAYED RELEASE ORAL EVERY 12 HOURS SCHEDULED
Status: DISCONTINUED | OUTPATIENT
Start: 2024-04-21 | End: 2024-04-26

## 2024-04-21 RX ORDER — OLANZAPINE 5 MG/1
5 TABLET ORAL NIGHTLY
Status: DISCONTINUED | OUTPATIENT
Start: 2024-04-21 | End: 2024-04-30 | Stop reason: HOSPADM

## 2024-04-21 RX ADMIN — PRAZOSIN HYDROCHLORIDE 1 MG: 1 CAPSULE ORAL at 21:05

## 2024-04-21 RX ADMIN — ACETAMINOPHEN 650 MG: 325 TABLET ORAL at 18:34

## 2024-04-21 RX ADMIN — TRAZODONE HYDROCHLORIDE 50 MG: 50 TABLET ORAL at 21:05

## 2024-04-21 RX ADMIN — DIVALPROEX SODIUM 250 MG: 250 TABLET, DELAYED RELEASE ORAL at 21:05

## 2024-04-21 RX ADMIN — OLANZAPINE 5 MG: 5 TABLET, FILM COATED ORAL at 21:05
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
## 2024-04-22 ENCOUNTER — APPOINTMENT (OUTPATIENT)
Dept: ULTRASOUND IMAGING | Age: 44
DRG: 885 | End: 2024-04-22
Payer: COMMERCIAL

## 2024-04-22 PROBLEM — E05.90 HYPERTHYROIDISM: Status: ACTIVE | Noted: 2024-04-22

## 2024-04-22 LAB
EKG ATRIAL RATE: 84 BPM
EKG P AXIS: 47 DEGREES
EKG P-R INTERVAL: 118 MS
EKG Q-T INTERVAL: 382 MS
EKG QRS DURATION: 84 MS
EKG QTC CALCULATION (BAZETT): 451 MS
EKG R AXIS: 75 DEGREES
EKG T AXIS: 58 DEGREES
EKG VENTRICULAR RATE: 84 BPM

## 2024-04-22 PROCEDURE — 6370000000 HC RX 637 (ALT 250 FOR IP)

## 2024-04-22 PROCEDURE — 84445 ASSAY OF TSI GLOBULIN: CPT

## 2024-04-22 PROCEDURE — 99233 SBSQ HOSP IP/OBS HIGH 50: CPT | Performed by: PSYCHIATRY & NEUROLOGY

## 2024-04-22 PROCEDURE — 6370000000 HC RX 637 (ALT 250 FOR IP): Performed by: PSYCHIATRY & NEUROLOGY

## 2024-04-22 PROCEDURE — 6370000000 HC RX 637 (ALT 250 FOR IP): Performed by: INTERNAL MEDICINE

## 2024-04-22 PROCEDURE — 83520 IMMUNOASSAY QUANT NOS NONAB: CPT

## 2024-04-22 PROCEDURE — 99222 1ST HOSP IP/OBS MODERATE 55: CPT | Performed by: INTERNAL MEDICINE

## 2024-04-22 PROCEDURE — 1240000000 HC EMOTIONAL WELLNESS R&B

## 2024-04-22 PROCEDURE — 76536 US EXAM OF HEAD AND NECK: CPT

## 2024-04-22 PROCEDURE — 6370000000 HC RX 637 (ALT 250 FOR IP): Performed by: NURSE PRACTITIONER

## 2024-04-22 PROCEDURE — 86376 MICROSOMAL ANTIBODY EACH: CPT

## 2024-04-22 RX ORDER — PROPRANOLOL HCL 60 MG
60 CAPSULE, EXTENDED RELEASE 24HR ORAL 2 TIMES DAILY
Status: DISCONTINUED | OUTPATIENT
Start: 2024-04-22 | End: 2024-04-30 | Stop reason: HOSPADM

## 2024-04-22 RX ORDER — METHIMAZOLE 10 MG/1
20 TABLET ORAL 3 TIMES DAILY
Status: DISCONTINUED | OUTPATIENT
Start: 2024-04-22 | End: 2024-04-30 | Stop reason: HOSPADM

## 2024-04-22 RX ADMIN — PROPRANOLOL HYDROCHLORIDE 60 MG: 60 CAPSULE, EXTENDED RELEASE ORAL at 20:57

## 2024-04-22 RX ADMIN — PROPRANOLOL HYDROCHLORIDE 60 MG: 60 CAPSULE, EXTENDED RELEASE ORAL at 13:41

## 2024-04-22 RX ADMIN — METHIMAZOLE 20 MG: 10 TABLET ORAL at 13:31

## 2024-04-22 RX ADMIN — DIVALPROEX SODIUM 250 MG: 250 TABLET, DELAYED RELEASE ORAL at 20:57

## 2024-04-22 RX ADMIN — HYPROMELLOSE 2910 1 DROP: 5 SOLUTION/ DROPS OPHTHALMIC at 19:18

## 2024-04-22 RX ADMIN — OLANZAPINE 5 MG: 5 TABLET, FILM COATED ORAL at 20:58

## 2024-04-22 RX ADMIN — PRAZOSIN HYDROCHLORIDE 1 MG: 1 CAPSULE ORAL at 21:03

## 2024-04-22 RX ADMIN — DIVALPROEX SODIUM 250 MG: 250 TABLET, DELAYED RELEASE ORAL at 09:07

## 2024-04-22 RX ADMIN — METHIMAZOLE 20 MG: 10 TABLET ORAL at 20:57

## 2024-04-22 RX ADMIN — HYPROMELLOSE 2910 1 DROP: 5 SOLUTION/ DROPS OPHTHALMIC at 14:41

## 2024-04-22 RX ADMIN — HYDROXYZINE PAMOATE 50 MG: 50 CAPSULE ORAL at 12:04

## 2024-04-22 RX ADMIN — HYDROXYZINE PAMOATE 50 MG: 50 CAPSULE ORAL at 19:18

## 2024-04-22 RX ADMIN — TRAZODONE HYDROCHLORIDE 50 MG: 50 TABLET ORAL at 20:57

## 2024-04-22 NOTE — CONSULTS
[facility]                   [address] [place] [address]                              CONSULTATION      PATIENT NAME: LENNY ADAMES                : 1980  MED REC NO: [id number]                        ROOM: [id number]  ACCOUNT NO: [id number]                       ADMIT DATE: 2024  PROVIDER: Declan Hill MD    ENDOCRINE CONSULT    REFERRING PHYSICIAN:  Vianney Swan      REASON FOR CONSULT:  Hyperthyroidism.    CHIEF COMPLAINT AND HISTORY OF PRESENT ILLNESS:  The patient is a 43-year-old female with known history of prior hypo or hyperthyroidism, admitted with depression and suicidal ideation.  The patient was planning to shoot herself in the head.  She was locked in a room with a gun planning to kill herself, then changed her mind.  The patient had labs done for thyroid function test showing significantly elevated free T4 of 6.17.  TSH was suppressed.  Free T3 was also elevated at 22.7.  The patient does have classic signs and symptoms of hyperthyroidism including tremors, palpitation, difficulty sleeping, anxiety, and 70-pound weight loss.  Also, has noted some bulging of her eyes.  Symptoms started close to a year ago.  No family history of thyroid.  The patient was adopted.  Reviewed the chemistries.  Sodium 141, potassium 4.4, chloride 105, CO2 was 16, BUN 12, creatinine was 1.03.  Drug screen positive for cannabinoids.  Hemoglobin was 14.3, WBC count 9.1.    PAST MEDICAL HISTORY:  Significant for anxiety, depression.    SURGICAL HISTORY:  Reviewed, noncontributory.    FAMILY HISTORY:  Unavailable due to patient adopted.    PERSONAL AND SOCIAL HISTORY:  Does smoke cigarettes.    ALLERGIES:  NONE.      MEDICATIONS:  Here include Depakote, Zyprexa, Minipress.    REVIEW OF SYSTEMS:  Other than depression, suicidal ideations and thought, weight loss, symptoms of hyperthyroidism, 14-point review of systems negative.    PHYSICAL EXAMINATION:  GENERAL:  The patient is 
to display       ASSESSMENT AND PLAN    Severe mixed bipolar disorder  Dr. Doran manage    Generalized anxiety and depression  emotional support has been provided  encourage participation in rehabilitation support group and recreational therapy    Tachycardia  Secondary to anxiety versus thyroid insufficiency  TSH < 0.010  HR ranges from 105-120  Patient reports increased anxiety, distractibility, feeling overwhelmed.  She also reports using eyedrops to reduce discomfort.  Positive for orbitopathy.  Lab work: Free T4 and T3  VS per unit routine  Eyedrops - artificial tears as needed  Consult to endocrinology    VTE Prophylaxis: Low risk for DVT    Plan of care discussed and agreed upon with: patient    Additional work up or/and treatment plan may be added today or then after based on clinical progression. I am managing a portion of pt care. Some medical issues are handled by other specialists. Additional work up and treatment should be done in out pt setting by pt PCP and other out pt providers.      In addition to examining and evaluating pt, I spent additional time explaining care, normal/abnormal findings and treatment plan, reviewing patient's chart and adjusting/ reconciling medications. All of pt questions were answered. Counseling, diet and education were  provided. Case will be discussed with nursing staff when appropriate. Family will be updated if and when appropriate.    SIGNATURE: Vianney Andersen, ABY - CNP  DATE: April 21, 2024  TIME: 3:37 PM     Andrey Lance MD - supervising physician

## 2024-04-23 LAB — THYROPEROXIDASE IGG SERPL-ACNC: 686 IU/ML (ref 0–25)

## 2024-04-23 PROCEDURE — 6370000000 HC RX 637 (ALT 250 FOR IP): Performed by: INTERNAL MEDICINE

## 2024-04-23 PROCEDURE — 99232 SBSQ HOSP IP/OBS MODERATE 35: CPT | Performed by: PSYCHIATRY & NEUROLOGY

## 2024-04-23 PROCEDURE — 1240000000 HC EMOTIONAL WELLNESS R&B

## 2024-04-23 PROCEDURE — 6370000000 HC RX 637 (ALT 250 FOR IP): Performed by: NURSE PRACTITIONER

## 2024-04-23 PROCEDURE — 6370000000 HC RX 637 (ALT 250 FOR IP): Performed by: PSYCHIATRY & NEUROLOGY

## 2024-04-23 RX ADMIN — TRAZODONE HYDROCHLORIDE 50 MG: 50 TABLET ORAL at 21:42

## 2024-04-23 RX ADMIN — METHIMAZOLE 20 MG: 10 TABLET ORAL at 21:26

## 2024-04-23 RX ADMIN — HYPROMELLOSE 2910 1 DROP: 5 SOLUTION/ DROPS OPHTHALMIC at 09:18

## 2024-04-23 RX ADMIN — OLANZAPINE 5 MG: 5 TABLET, FILM COATED ORAL at 21:26

## 2024-04-23 RX ADMIN — METHIMAZOLE 20 MG: 10 TABLET ORAL at 13:40

## 2024-04-23 RX ADMIN — HYDROXYZINE PAMOATE 50 MG: 50 CAPSULE ORAL at 09:19

## 2024-04-23 RX ADMIN — PROPRANOLOL HYDROCHLORIDE 60 MG: 60 CAPSULE, EXTENDED RELEASE ORAL at 08:05

## 2024-04-23 RX ADMIN — DIVALPROEX SODIUM 250 MG: 250 TABLET, DELAYED RELEASE ORAL at 21:25

## 2024-04-23 RX ADMIN — DIVALPROEX SODIUM 250 MG: 250 TABLET, DELAYED RELEASE ORAL at 08:06

## 2024-04-23 RX ADMIN — PRAZOSIN HYDROCHLORIDE 1 MG: 1 CAPSULE ORAL at 21:25

## 2024-04-23 RX ADMIN — PROPRANOLOL HYDROCHLORIDE 60 MG: 60 CAPSULE, EXTENDED RELEASE ORAL at 21:25

## 2024-04-23 RX ADMIN — METHIMAZOLE 20 MG: 10 TABLET ORAL at 08:06

## 2024-04-24 LAB
TSH RECEP AB SER-ACNC: 4.46 IU/L
TSI SER-ACNC: 3.03 IU/L

## 2024-04-24 PROCEDURE — 1240000000 HC EMOTIONAL WELLNESS R&B

## 2024-04-24 PROCEDURE — 99232 SBSQ HOSP IP/OBS MODERATE 35: CPT | Performed by: PSYCHIATRY & NEUROLOGY

## 2024-04-24 PROCEDURE — 6370000000 HC RX 637 (ALT 250 FOR IP): Performed by: INTERNAL MEDICINE

## 2024-04-24 PROCEDURE — 6370000000 HC RX 637 (ALT 250 FOR IP): Performed by: PSYCHIATRY & NEUROLOGY

## 2024-04-24 PROCEDURE — 6370000000 HC RX 637 (ALT 250 FOR IP): Performed by: NURSE PRACTITIONER

## 2024-04-24 RX ORDER — LORAZEPAM 1 MG/1
1 TABLET ORAL ONCE
Status: COMPLETED | OUTPATIENT
Start: 2024-04-24 | End: 2024-04-24

## 2024-04-24 RX ADMIN — PRAZOSIN HYDROCHLORIDE 1 MG: 1 CAPSULE ORAL at 21:21

## 2024-04-24 RX ADMIN — TRAZODONE HYDROCHLORIDE 50 MG: 50 TABLET ORAL at 21:39

## 2024-04-24 RX ADMIN — DIVALPROEX SODIUM 250 MG: 250 TABLET, DELAYED RELEASE ORAL at 08:19

## 2024-04-24 RX ADMIN — PROPRANOLOL HYDROCHLORIDE 60 MG: 60 CAPSULE, EXTENDED RELEASE ORAL at 21:21

## 2024-04-24 RX ADMIN — PROPRANOLOL HYDROCHLORIDE 60 MG: 60 CAPSULE, EXTENDED RELEASE ORAL at 08:19

## 2024-04-24 RX ADMIN — DIVALPROEX SODIUM 250 MG: 250 TABLET, DELAYED RELEASE ORAL at 21:21

## 2024-04-24 RX ADMIN — METHIMAZOLE 20 MG: 10 TABLET ORAL at 21:21

## 2024-04-24 RX ADMIN — METHIMAZOLE 20 MG: 10 TABLET ORAL at 08:19

## 2024-04-24 RX ADMIN — METHIMAZOLE 20 MG: 10 TABLET ORAL at 13:46

## 2024-04-24 RX ADMIN — HYDROXYZINE PAMOATE 50 MG: 50 CAPSULE ORAL at 10:07

## 2024-04-24 RX ADMIN — HYPROMELLOSE 2910 1 DROP: 5 SOLUTION/ DROPS OPHTHALMIC at 08:19

## 2024-04-24 RX ADMIN — LORAZEPAM 1 MG: 1 TABLET ORAL at 13:46

## 2024-04-24 RX ADMIN — OLANZAPINE 5 MG: 5 TABLET, FILM COATED ORAL at 21:21

## 2024-04-24 NOTE — FLOWSHEET NOTE
Patient found sitting in floor in corner of room crying.  Visteril given for anxiety of 10 out of 10. Will continue to monitor.

## 2024-04-25 LAB — VALPROATE SERPL-MCNC: 32.7 UG/ML (ref 50–100)

## 2024-04-25 PROCEDURE — 1240000000 HC EMOTIONAL WELLNESS R&B

## 2024-04-25 PROCEDURE — 80164 ASSAY DIPROPYLACETIC ACD TOT: CPT

## 2024-04-25 PROCEDURE — 36415 COLL VENOUS BLD VENIPUNCTURE: CPT

## 2024-04-25 PROCEDURE — 6370000000 HC RX 637 (ALT 250 FOR IP): Performed by: PSYCHIATRY & NEUROLOGY

## 2024-04-25 PROCEDURE — 6370000000 HC RX 637 (ALT 250 FOR IP): Performed by: NURSE PRACTITIONER

## 2024-04-25 PROCEDURE — 99232 SBSQ HOSP IP/OBS MODERATE 35: CPT | Performed by: PSYCHIATRY & NEUROLOGY

## 2024-04-25 PROCEDURE — 90833 PSYTX W PT W E/M 30 MIN: CPT | Performed by: PSYCHIATRY & NEUROLOGY

## 2024-04-25 PROCEDURE — 6370000000 HC RX 637 (ALT 250 FOR IP): Performed by: INTERNAL MEDICINE

## 2024-04-25 RX ADMIN — METHIMAZOLE 20 MG: 10 TABLET ORAL at 08:53

## 2024-04-25 RX ADMIN — PROPRANOLOL HYDROCHLORIDE 60 MG: 60 CAPSULE, EXTENDED RELEASE ORAL at 21:08

## 2024-04-25 RX ADMIN — DIVALPROEX SODIUM 250 MG: 250 TABLET, DELAYED RELEASE ORAL at 08:53

## 2024-04-25 RX ADMIN — HYDROXYZINE PAMOATE 50 MG: 50 CAPSULE ORAL at 08:53

## 2024-04-25 RX ADMIN — OLANZAPINE 5 MG: 5 TABLET, FILM COATED ORAL at 21:09

## 2024-04-25 RX ADMIN — PRAZOSIN HYDROCHLORIDE 1 MG: 1 CAPSULE ORAL at 21:09

## 2024-04-25 RX ADMIN — PROPRANOLOL HYDROCHLORIDE 60 MG: 60 CAPSULE, EXTENDED RELEASE ORAL at 08:53

## 2024-04-25 RX ADMIN — DIVALPROEX SODIUM 250 MG: 250 TABLET, DELAYED RELEASE ORAL at 21:09

## 2024-04-25 RX ADMIN — METHIMAZOLE 20 MG: 10 TABLET ORAL at 21:08

## 2024-04-25 RX ADMIN — METHIMAZOLE 20 MG: 10 TABLET ORAL at 13:17

## 2024-04-25 NOTE — FLOWSHEET NOTE
The patient requested Trazodone due to poor sleep. Administered per mar with Trazodone 50 mg po PRN.

## 2024-04-26 PROCEDURE — 6370000000 HC RX 637 (ALT 250 FOR IP): Performed by: PSYCHIATRY & NEUROLOGY

## 2024-04-26 PROCEDURE — 90833 PSYTX W PT W E/M 30 MIN: CPT | Performed by: PSYCHIATRY & NEUROLOGY

## 2024-04-26 PROCEDURE — 6370000000 HC RX 637 (ALT 250 FOR IP): Performed by: INTERNAL MEDICINE

## 2024-04-26 PROCEDURE — 99232 SBSQ HOSP IP/OBS MODERATE 35: CPT | Performed by: PSYCHIATRY & NEUROLOGY

## 2024-04-26 PROCEDURE — 6370000000 HC RX 637 (ALT 250 FOR IP): Performed by: NURSE PRACTITIONER

## 2024-04-26 PROCEDURE — 1240000000 HC EMOTIONAL WELLNESS R&B

## 2024-04-26 RX ORDER — DIVALPROEX SODIUM 500 MG/1
500 TABLET, DELAYED RELEASE ORAL EVERY 12 HOURS SCHEDULED
Status: DISCONTINUED | OUTPATIENT
Start: 2024-04-26 | End: 2024-04-30 | Stop reason: HOSPADM

## 2024-04-26 RX ADMIN — HYPROMELLOSE 2910 1 DROP: 5 SOLUTION/ DROPS OPHTHALMIC at 12:59

## 2024-04-26 RX ADMIN — DIVALPROEX SODIUM 250 MG: 250 TABLET, DELAYED RELEASE ORAL at 08:46

## 2024-04-26 RX ADMIN — METHIMAZOLE 20 MG: 10 TABLET ORAL at 21:17

## 2024-04-26 RX ADMIN — METHIMAZOLE 20 MG: 10 TABLET ORAL at 13:34

## 2024-04-26 RX ADMIN — PROPRANOLOL HYDROCHLORIDE 60 MG: 60 CAPSULE, EXTENDED RELEASE ORAL at 08:46

## 2024-04-26 RX ADMIN — TRAZODONE HYDROCHLORIDE 50 MG: 50 TABLET ORAL at 21:18

## 2024-04-26 RX ADMIN — PRAZOSIN HYDROCHLORIDE 1 MG: 1 CAPSULE ORAL at 21:18

## 2024-04-26 RX ADMIN — OLANZAPINE 5 MG: 5 TABLET, FILM COATED ORAL at 21:17

## 2024-04-26 RX ADMIN — PROPRANOLOL HYDROCHLORIDE 60 MG: 60 CAPSULE, EXTENDED RELEASE ORAL at 21:17

## 2024-04-26 RX ADMIN — DIVALPROEX SODIUM 500 MG: 500 TABLET, DELAYED RELEASE ORAL at 21:17

## 2024-04-26 RX ADMIN — METHIMAZOLE 20 MG: 10 TABLET ORAL at 08:46

## 2024-04-27 PROCEDURE — 1240000000 HC EMOTIONAL WELLNESS R&B

## 2024-04-27 PROCEDURE — 6370000000 HC RX 637 (ALT 250 FOR IP): Performed by: INTERNAL MEDICINE

## 2024-04-27 PROCEDURE — 6370000000 HC RX 637 (ALT 250 FOR IP): Performed by: PSYCHIATRY & NEUROLOGY

## 2024-04-27 PROCEDURE — 6370000000 HC RX 637 (ALT 250 FOR IP): Performed by: NURSE PRACTITIONER

## 2024-04-27 RX ORDER — PRAZOSIN HYDROCHLORIDE 1 MG/1
2 CAPSULE ORAL NIGHTLY
Status: DISCONTINUED | OUTPATIENT
Start: 2024-04-27 | End: 2024-04-30 | Stop reason: HOSPADM

## 2024-04-27 RX ADMIN — PRAZOSIN HYDROCHLORIDE 2 MG: 1 CAPSULE ORAL at 21:17

## 2024-04-27 RX ADMIN — METHIMAZOLE 20 MG: 10 TABLET ORAL at 14:34

## 2024-04-27 RX ADMIN — HYPROMELLOSE 2910 1 DROP: 5 SOLUTION/ DROPS OPHTHALMIC at 18:22

## 2024-04-27 RX ADMIN — DIVALPROEX SODIUM 500 MG: 500 TABLET, DELAYED RELEASE ORAL at 09:15

## 2024-04-27 RX ADMIN — METHIMAZOLE 20 MG: 10 TABLET ORAL at 09:14

## 2024-04-27 RX ADMIN — OLANZAPINE 5 MG: 5 TABLET, FILM COATED ORAL at 21:17

## 2024-04-27 RX ADMIN — TRAZODONE HYDROCHLORIDE 50 MG: 50 TABLET ORAL at 21:19

## 2024-04-27 RX ADMIN — METHIMAZOLE 20 MG: 10 TABLET ORAL at 21:17

## 2024-04-27 RX ADMIN — DIVALPROEX SODIUM 500 MG: 500 TABLET, DELAYED RELEASE ORAL at 21:18

## 2024-04-27 RX ADMIN — PROPRANOLOL HYDROCHLORIDE 60 MG: 60 CAPSULE, EXTENDED RELEASE ORAL at 21:16

## 2024-04-27 RX ADMIN — PROPRANOLOL HYDROCHLORIDE 60 MG: 60 CAPSULE, EXTENDED RELEASE ORAL at 09:14

## 2024-04-28 PROCEDURE — 99232 SBSQ HOSP IP/OBS MODERATE 35: CPT | Performed by: PHYSICIAN ASSISTANT

## 2024-04-28 PROCEDURE — 6370000000 HC RX 637 (ALT 250 FOR IP): Performed by: INTERNAL MEDICINE

## 2024-04-28 PROCEDURE — 6370000000 HC RX 637 (ALT 250 FOR IP): Performed by: NURSE PRACTITIONER

## 2024-04-28 PROCEDURE — 1240000000 HC EMOTIONAL WELLNESS R&B

## 2024-04-28 PROCEDURE — 6370000000 HC RX 637 (ALT 250 FOR IP): Performed by: PSYCHIATRY & NEUROLOGY

## 2024-04-28 RX ORDER — METHIMAZOLE 10 MG/1
20 TABLET ORAL 3 TIMES DAILY
Qty: 180 TABLET | Refills: 3 | Status: SHIPPED | OUTPATIENT
Start: 2024-04-28 | End: 2024-04-30

## 2024-04-28 RX ORDER — PROPRANOLOL HCL 60 MG
60 CAPSULE, EXTENDED RELEASE 24HR ORAL DAILY
Qty: 30 CAPSULE | Refills: 3 | Status: SHIPPED | OUTPATIENT
Start: 2024-04-28 | End: 2024-04-30

## 2024-04-28 RX ADMIN — DIVALPROEX SODIUM 500 MG: 500 TABLET, DELAYED RELEASE ORAL at 08:51

## 2024-04-28 RX ADMIN — PROPRANOLOL HYDROCHLORIDE 60 MG: 60 CAPSULE, EXTENDED RELEASE ORAL at 20:32

## 2024-04-28 RX ADMIN — DIVALPROEX SODIUM 500 MG: 500 TABLET, DELAYED RELEASE ORAL at 20:32

## 2024-04-28 RX ADMIN — METHIMAZOLE 20 MG: 10 TABLET ORAL at 14:14

## 2024-04-28 RX ADMIN — METHIMAZOLE 20 MG: 10 TABLET ORAL at 20:32

## 2024-04-28 RX ADMIN — TRAZODONE HYDROCHLORIDE 50 MG: 50 TABLET ORAL at 20:41

## 2024-04-28 RX ADMIN — METHIMAZOLE 20 MG: 10 TABLET ORAL at 08:51

## 2024-04-28 RX ADMIN — OLANZAPINE 5 MG: 5 TABLET, FILM COATED ORAL at 20:32

## 2024-04-28 RX ADMIN — PRAZOSIN HYDROCHLORIDE 2 MG: 1 CAPSULE ORAL at 20:38

## 2024-04-28 RX ADMIN — PROPRANOLOL HYDROCHLORIDE 60 MG: 60 CAPSULE, EXTENDED RELEASE ORAL at 08:51

## 2024-04-28 RX ADMIN — HYPROMELLOSE 2910 1 DROP: 5 SOLUTION/ DROPS OPHTHALMIC at 09:52

## 2024-04-29 PROCEDURE — 6370000000 HC RX 637 (ALT 250 FOR IP): Performed by: NURSE PRACTITIONER

## 2024-04-29 PROCEDURE — 99232 SBSQ HOSP IP/OBS MODERATE 35: CPT | Performed by: PSYCHIATRY & NEUROLOGY

## 2024-04-29 PROCEDURE — 6370000000 HC RX 637 (ALT 250 FOR IP)

## 2024-04-29 PROCEDURE — 6370000000 HC RX 637 (ALT 250 FOR IP): Performed by: PSYCHIATRY & NEUROLOGY

## 2024-04-29 PROCEDURE — 90833 PSYTX W PT W E/M 30 MIN: CPT | Performed by: PSYCHIATRY & NEUROLOGY

## 2024-04-29 PROCEDURE — 6370000000 HC RX 637 (ALT 250 FOR IP): Performed by: INTERNAL MEDICINE

## 2024-04-29 PROCEDURE — 1240000000 HC EMOTIONAL WELLNESS R&B

## 2024-04-29 RX ADMIN — PROPRANOLOL HYDROCHLORIDE 60 MG: 60 CAPSULE, EXTENDED RELEASE ORAL at 09:44

## 2024-04-29 RX ADMIN — HYPROMELLOSE 2910 1 DROP: 5 SOLUTION/ DROPS OPHTHALMIC at 09:45

## 2024-04-29 RX ADMIN — METHIMAZOLE 20 MG: 10 TABLET ORAL at 21:08

## 2024-04-29 RX ADMIN — DIVALPROEX SODIUM 500 MG: 500 TABLET, DELAYED RELEASE ORAL at 09:44

## 2024-04-29 RX ADMIN — METHIMAZOLE 20 MG: 10 TABLET ORAL at 14:13

## 2024-04-29 RX ADMIN — PRAZOSIN HYDROCHLORIDE 2 MG: 1 CAPSULE ORAL at 21:08

## 2024-04-29 RX ADMIN — PROPRANOLOL HYDROCHLORIDE 60 MG: 60 CAPSULE, EXTENDED RELEASE ORAL at 21:08

## 2024-04-29 RX ADMIN — OLANZAPINE 5 MG: 5 TABLET, FILM COATED ORAL at 21:09

## 2024-04-29 RX ADMIN — DIVALPROEX SODIUM 500 MG: 500 TABLET, DELAYED RELEASE ORAL at 21:09

## 2024-04-29 RX ADMIN — METHIMAZOLE 20 MG: 10 TABLET ORAL at 09:44
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
## 2024-04-30 VITALS
OXYGEN SATURATION: 100 % | WEIGHT: 150 LBS | SYSTOLIC BLOOD PRESSURE: 109 MMHG | DIASTOLIC BLOOD PRESSURE: 63 MMHG | RESPIRATION RATE: 16 BRPM | BODY MASS INDEX: 22.73 KG/M2 | HEART RATE: 84 BPM | HEIGHT: 68 IN | TEMPERATURE: 97.7 F

## 2024-04-30 PROCEDURE — 99239 HOSP IP/OBS DSCHRG MGMT >30: CPT | Performed by: PSYCHIATRY & NEUROLOGY

## 2024-04-30 PROCEDURE — 6370000000 HC RX 637 (ALT 250 FOR IP): Performed by: PSYCHIATRY & NEUROLOGY

## 2024-04-30 PROCEDURE — 6370000000 HC RX 637 (ALT 250 FOR IP): Performed by: INTERNAL MEDICINE

## 2024-04-30 RX ORDER — OLANZAPINE 5 MG/1
5 TABLET ORAL NIGHTLY
Qty: 15 TABLET | Refills: 3 | Status: SHIPPED | OUTPATIENT
Start: 2024-04-30

## 2024-04-30 RX ORDER — DIVALPROEX SODIUM 500 MG/1
500 TABLET, DELAYED RELEASE ORAL EVERY 12 HOURS SCHEDULED
Qty: 30 TABLET | Refills: 3 | Status: SHIPPED | OUTPATIENT
Start: 2024-04-30

## 2024-04-30 RX ORDER — PRAZOSIN HYDROCHLORIDE 2 MG/1
2 CAPSULE ORAL NIGHTLY
Qty: 15 CAPSULE | Refills: 3 | Status: SHIPPED | OUTPATIENT
Start: 2024-04-30

## 2024-04-30 RX ORDER — TRAZODONE HYDROCHLORIDE 50 MG/1
50 TABLET ORAL NIGHTLY PRN
Qty: 15 TABLET | Refills: 2 | Status: SHIPPED | OUTPATIENT
Start: 2024-04-30

## 2024-04-30 RX ORDER — METHIMAZOLE 10 MG/1
20 TABLET ORAL 3 TIMES DAILY
Qty: 90 TABLET | Refills: 0 | Status: SHIPPED | OUTPATIENT
Start: 2024-04-30 | End: 2024-05-15

## 2024-04-30 RX ORDER — PROPRANOLOL HCL 60 MG
60 CAPSULE, EXTENDED RELEASE 24HR ORAL DAILY
Qty: 15 CAPSULE | Refills: 3 | Status: SHIPPED | OUTPATIENT
Start: 2024-04-30

## 2024-04-30 RX ADMIN — PROPRANOLOL HYDROCHLORIDE 60 MG: 60 CAPSULE, EXTENDED RELEASE ORAL at 08:37

## 2024-04-30 RX ADMIN — METHIMAZOLE 20 MG: 10 TABLET ORAL at 08:37

## 2024-04-30 RX ADMIN — HYPROMELLOSE 2910 1 DROP: 5 SOLUTION/ DROPS OPHTHALMIC at 08:36

## 2024-04-30 RX ADMIN — DIVALPROEX SODIUM 500 MG: 500 TABLET, DELAYED RELEASE ORAL at 08:37

## 2024-04-30 NOTE — PLAN OF CARE
Problem: Risk for Elopement  Goal: Patient will not exit the unit/facility without proper excort  4/28/2024 0946 by Ursula Dobson RN  Outcome: Progressing  Flowsheets (Taken 4/28/2024 0944)  Nursing Interventions for Elopement Risk:   Assist with personal care needs such as toileting, eating, dressing, as needed to reduce the risk of wandering   Reduce environmental triggers  4/27/2024 2059 by Khloe Yang RN  Outcome: Progressing     Problem: Anxiety  Goal: Will report anxiety at manageable levels  Description: INTERVENTIONS:  1. Administer medication as ordered  2. Teach and rehearse alternative coping skills  3. Provide emotional support with 1:1 interaction with staff  4/28/2024 0946 by Ursula Dobson RN  Outcome: Progressing  Flowsheets (Taken 4/28/2024 0944)  Will report anxiety at manageable levels: Provide emotional support with 1:1 interaction with staff  4/27/2024 2059 by Khloe Yang RN  Outcome: Progressing     Problem: Coping  Goal: Pt/Family able to verbalize concerns and demonstrate effective coping strategies  Description: INTERVENTIONS:  1. Assist patient/family to identify coping skills, available support systems and cultural and spiritual values  2. Provide emotional support, including active listening and acknowledgement of concerns of patient and caregivers  3. Reduce environmental stimuli, as able  4. Instruct patient/family in relaxation techniques, as appropriate  5. Assess for spiritual pain/suffering and initiate Spiritual Care, Psychosocial Clinical Specialist consults as needed  4/28/2024 0946 by Ursula Dobson RN  Outcome: Progressing  Flowsheets (Taken 4/28/2024 0944)  Patient/family able to verbalize anxieties, fears, and concerns, and demonstrate effective coping: Assist patient/family to identify coping skills, available support systems and cultural and spiritual values  4/27/2024 2059 by Khloe Yang RN  Outcome: Progressing     Problem: Depression/Self 
Assessment complete.  Pt denies SI, Hi and A/VH.  States depression in general of 1 and anxiety of 2 in general.  Denies pain or discomfort.  Good eye contact and brightened upon approach.  Eating and drinking ok. Denies any needs at this time.  
Patient continues to cry in her room, she states her anxiety is still high and the doctor was going to order her ativan.  Patient unable to rate her depression but states \"I am feeling pretty sad.\"  Patient reports her sleep was poor but appetite is okay.  Patient rated anxiety earlier a 10 and was given visteril at 10 a.m  Patient is very soft spoken and was upset with an employee and peer about where she was putting together a puzzle.  Patient denies SI/HI and AVH>  Problem: Risk for Elopement  Goal: Patient will not exit the unit/facility without proper excort  Outcome: Progressing     Problem: Anxiety  Goal: Will report anxiety at manageable levels  Description: INTERVENTIONS:  1. Administer medication as ordered  2. Teach and rehearse alternative coping skills  3. Provide emotional support with 1:1 interaction with staff  Outcome: Progressing     Problem: Coping  Goal: Pt/Family able to verbalize concerns and demonstrate effective coping strategies  Description: INTERVENTIONS:  1. Assist patient/family to identify coping skills, available support systems and cultural and spiritual values  2. Provide emotional support, including active listening and acknowledgement of concerns of patient and caregivers  3. Reduce environmental stimuli, as able  4. Instruct patient/family in relaxation techniques, as appropriate  5. Assess for spiritual pain/suffering and initiate Spiritual Care, Psychosocial Clinical Specialist consults as needed  Outcome: Progressing     Problem: Depression/Self Harm  Goal: Effect of psychiatric condition will be minimized and patient will be protected from self harm  Description: INTERVENTIONS:  1. Assess impact of patient's symptoms on level of functioning, self care needs and offer support as indicated  2. Assess patient/family knowledge of depression, impact on illness and need for teaching  3. Provide emotional support, presence and reassurance  4. Assess for possible suicidal thoughts or 
Patient is out doing word searches. Flat affect. Cooperative with her assessment. Reports feeling less \"high and restless\". Pt received vistaril at 19:17pm and now rates her anxiety 6/10. Denies depression. Denies SI/HI and AVH. Pt is attending groups. Denies further needs at this time   Problem: Risk for Elopement  Goal: Patient will not exit the unit/facility without proper excort  4/22/2024 2026 by Khloe Yang, RN  Outcome: Progressing  Flowsheets (Taken 4/22/2024 1308 by David Walker, RN)  Nursing Interventions for Elopement Risk:   Reduce environmental triggers   Communicate to physician the risk for elopement  4/22/2024 0924 by David Walker RN  Outcome: Progressing     Problem: Anxiety  Goal: Will report anxiety at manageable levels  Description: INTERVENTIONS:  1. Administer medication as ordered  2. Teach and rehearse alternative coping skills  3. Provide emotional support with 1:1 interaction with staff  4/22/2024 2026 by Khloe Yang, RN  Outcome: Progressing  Flowsheets (Taken 4/22/2024 1308 by David Walker, RN)  Will report anxiety at manageable levels:   Administer medication as ordered   Teach and rehearse alternative coping skills   Provide emotional support with 1:1 interaction with staff  4/22/2024 0924 by David Walker RN  Outcome: Progressing     Problem: Coping  Goal: Pt/Family able to verbalize concerns and demonstrate effective coping strategies  Description: INTERVENTIONS:  1. Assist patient/family to identify coping skills, available support systems and cultural and spiritual values  2. Provide emotional support, including active listening and acknowledgement of concerns of patient and caregivers  3. Reduce environmental stimuli, as able  4. Instruct patient/family in relaxation techniques, as appropriate  5. Assess for spiritual pain/suffering and initiate Spiritual Care, Psychosocial Clinical Specialist consults as needed  4/22/2024 2026 by Trey 
Patient is out watching TV. Brightened affect. Reports she had a good day. Pt is attending groups. Denies anxiety. Rates depression 1/10. Denies SI/HI and AVH. Pt states she wants to start working on loving herself. Denies further needs at this time   Problem: Risk for Elopement  Goal: Patient will not exit the unit/facility without proper excort  4/27/2024 2059 by Khloe Yang RN  Outcome: Progressing  4/27/2024 1013 by Ursula Dobson RN  Outcome: Progressing  Flowsheets (Taken 4/27/2024 1009)  Nursing Interventions for Elopement Risk:   Assist with personal care needs such as toileting, eating, dressing, as needed to reduce the risk of wandering   Reduce environmental triggers     Problem: Anxiety  Goal: Will report anxiety at manageable levels  Description: INTERVENTIONS:  1. Administer medication as ordered  2. Teach and rehearse alternative coping skills  3. Provide emotional support with 1:1 interaction with staff  4/27/2024 2059 by Khloe Yang RN  Outcome: Progressing  4/27/2024 1013 by Ursula Dobson RN  Outcome: Progressing  Flowsheets (Taken 4/27/2024 1009)  Will report anxiety at manageable levels:   Administer medication as ordered   Provide emotional support with 1:1 interaction with staff     Problem: Coping  Goal: Pt/Family able to verbalize concerns and demonstrate effective coping strategies  Description: INTERVENTIONS:  1. Assist patient/family to identify coping skills, available support systems and cultural and spiritual values  2. Provide emotional support, including active listening and acknowledgement of concerns of patient and caregivers  3. Reduce environmental stimuli, as able  4. Instruct patient/family in relaxation techniques, as appropriate  5. Assess for spiritual pain/suffering and initiate Spiritual Care, Psychosocial Clinical Specialist consults as needed  4/27/2024 2059 by Khloe Yang RN  Outcome: Progressing  4/27/2024 1013 by Ursula Dobson RN  Outcome: 
Patient reports she is positive for SI.  She has a plan, at discharge, she will access her weapon and kill herself.  Denies HI.  Denies AVH.  Rates depression as a \"9\" and anxiety a \"9\" on 1-10 scale.   Slept well last night after arriving to the hospital.  Previous two nights, while still at home, client denies getting any sleep at all. Reports she has nightmares. Appetite is good.  Attended group.  Social and on unit, talking to peers.          Problem: Risk for Elopement  Goal: Patient will not exit the unit/facility without proper excort  Recent Flowsheet Documentation  Taken 4/21/2024 1333 by Keesha Gates RN  Nursing Interventions for Elopement Risk:   Reduce environmental triggers   Communicate to physician the risk for elopement  4/21/2024 0752 by Johanna Johnson RN  Outcome: Progressing     Problem: Anxiety  Goal: Will report anxiety at manageable levels  Description: INTERVENTIONS:  1. Administer medication as ordered  2. Teach and rehearse alternative coping skills  3. Provide emotional support with 1:1 interaction with staff  Recent Flowsheet Documentation  Taken 4/21/2024 1343 by Keesha Gates RN  Will report anxiety at manageable levels:   Administer medication as ordered   Provide emotional support with 1:1 interaction with staff   Teach and rehearse alternative coping skills  Taken 4/21/2024 1333 by Keesha Gates RN  Will report anxiety at manageable levels:   Administer medication as ordered   Provide emotional support with 1:1 interaction with staff   Teach and rehearse alternative coping skills  4/21/2024 0752 by Johanna Johnson RN  Outcome: Progressing     Problem: Coping  Goal: Pt/Family able to verbalize concerns and demonstrate effective coping strategies  Description: INTERVENTIONS:  1. Assist patient/family to identify coping skills, available support systems and cultural and spiritual values  2. Provide emotional support, including active listening and acknowledgement of concerns of patient 
Spiritual Care, Psychosocial Clinical Specialist consults as needed  4/23/2024 0939 by David Walker RN  Outcome: Progressing  4/22/2024 2026 by Khloe Yang RN  Outcome: Progressing  Flowsheets (Taken 4/22/2024 1308 by David Walker RN)  Patient/family able to verbalize anxieties, fears, and concerns, and demonstrate effective coping:   Instruct patient/family in relaxation techniques, as appropriate   Reduce environmental stimuli, as able   Provide emotional support, including active listening and acknowledgement of concerns of patient and caregivers     Problem: Depression/Self Harm  Goal: Effect of psychiatric condition will be minimized and patient will be protected from self harm  Description: INTERVENTIONS:  1. Assess impact of patient's symptoms on level of functioning, self care needs and offer support as indicated  2. Assess patient/family knowledge of depression, impact on illness and need for teaching  3. Provide emotional support, presence and reassurance  4. Assess for possible suicidal thoughts or ideation. If patient expresses suicidal thoughts or statements do not leave alone, initiate Suicide Precautions, move to a room close to the nursing station and obtain sitter  5. Initiate consults as appropriate with Mental Health Professional, Spiritual Care, Psychosocial CNS, and consider a recommendation to the [facility] for a Psychiatric Consultation  4/23/2024 0939 by David Walker RN  Outcome: Progressing  4/22/2024 2026 by Khloe Yang RN  Outcome: Progressing  Flowsheets  Taken 4/22/2024 2026 by Khloe Yang RN  Effect of psychiatric condition will be minimized and patient will be protected from self harm: Provide emotional support, presence and reassurance  Taken 4/22/2024 1308 by David Walker RN  Effect of psychiatric condition will be minimized and patient will be protected from self harm:   Assess for suicidal thoughts or ideation. If patient expresses 
available support systems and cultural and spiritual values   Reduce environmental stimuli, as able   Instruct patient/family in relaxation techniques, as appropriate     Problem: Depression/Self Harm  Goal: Effect of psychiatric condition will be minimized and patient will be protected from self harm  Description: INTERVENTIONS:  1. Assess impact of patient's symptoms on level of functioning, self care needs and offer support as indicated  2. Assess patient/family knowledge of depression, impact on illness and need for teaching  3. Provide emotional support, presence and reassurance  4. Assess for possible suicidal thoughts or ideation. If patient expresses suicidal thoughts or statements do not leave alone, initiate Suicide Precautions, move to a room close to the nursing station and obtain sitter  5. Initiate consults as appropriate with Mental Health Professional, Spiritual Care, Psychosocial CNS, and consider a recommendation to the [facility] for a Psychiatric Consultation  4/30/2024 0039 by Flori Burgos, RN  Outcome: Progressing  4/29/2024 1051 by Keesha Gates RN  Outcome: Progressing  Flowsheets  Taken 4/29/2024 1050  Effect of psychiatric condition will be minimized and patient will be protected from self harm:   Assess impact of patient’s symptoms on level of functioning, self care needs and offer support as indicated   Assess patient/family knowledge of depression, impact on illness and need for teaching   Provide emotional support, presence and reassurance   Assess for suicidal thoughts or ideation. If patient expresses suicidal thoughts or statements do not leave alone, initiate Suicide Precautions, move near nurse station, obtain sitter  Taken 4/29/2024 1039  Effect of psychiatric condition will be minimized and patient will be protected from self harm:   Assess impact of patient’s symptoms on level of functioning, self care needs and offer support as indicated   Assess patient/family knowledge of 
coping skills, available support systems and cultural and spiritual values  2. Provide emotional support, including active listening and acknowledgement of concerns of patient and caregivers  3. Reduce environmental stimuli, as able  4. Instruct patient/family in relaxation techniques, as appropriate  5. Assess for spiritual pain/suffering and initiate Spiritual Care, Psychosocial Clinical Specialist consults as needed  4/26/2024 2012 by Flori Burgos RN  Outcome: Progressing  4/26/2024 1311 by Keesha Gates RN  Outcome: Progressing  Flowsheets (Taken 4/26/2024 1255)  Patient/family able to verbalize anxieties, fears, and concerns, and demonstrate effective coping:   Provide emotional support, including active listening and acknowledgement of concerns of patient and caregivers   Reduce environmental stimuli, as able     Problem: Depression/Self Harm  Goal: Effect of psychiatric condition will be minimized and patient will be protected from self harm  Description: INTERVENTIONS:  1. Assess impact of patient's symptoms on level of functioning, self care needs and offer support as indicated  2. Assess patient/family knowledge of depression, impact on illness and need for teaching  3. Provide emotional support, presence and reassurance  4. Assess for possible suicidal thoughts or ideation. If patient expresses suicidal thoughts or statements do not leave alone, initiate Suicide Precautions, move to a room close to the nursing station and obtain sitter  5. Initiate consults as appropriate with Mental Health Professional, Spiritual Care, Psychosocial CNS, and consider a recommendation to the [facility] for a Psychiatric Consultation  4/26/2024 2012 by Flori Burgos, RN  Outcome: Progressing  4/26/2024 1311 by Keesha Gates RN  Outcome: Progressing  Flowsheets  Taken 4/26/2024 1309  Effect of psychiatric condition will be minimized and patient will be protected from self harm:   Assess impact of patient’s 
report anxiety at manageable levels:   Administer medication as ordered   Teach and rehearse alternative coping skills   Provide emotional support with 1:1 interaction with staff     Problem: Coping  Goal: Pt/Family able to verbalize concerns and demonstrate effective coping strategies  Description: INTERVENTIONS:  1. Assist patient/family to identify coping skills, available support systems and cultural and spiritual values  2. Provide emotional support, including active listening and acknowledgement of concerns of patient and caregivers  3. Reduce environmental stimuli, as able  4. Instruct patient/family in relaxation techniques, as appropriate  5. Assess for spiritual pain/suffering and initiate Spiritual Care, Psychosocial Clinical Specialist consults as needed  4/25/2024 0726 by David Walker, RN  Outcome: Progressing  4/24/2024 2005 by Zack Bernal RN  Outcome: Progressing  Flowsheets (Taken 4/24/2024 1304 by David Walker, RN)  Patient/family able to verbalize anxieties, fears, and concerns, and demonstrate effective coping:   Reduce environmental stimuli, as able   Provide emotional support, including active listening and acknowledgement of concerns of patient and caregivers   Instruct patient/family in relaxation techniques, as appropriate     Problem: Depression/Self Harm  Goal: Effect of psychiatric condition will be minimized and patient will be protected from self harm  Description: INTERVENTIONS:  1. Assess impact of patient's symptoms on level of functioning, self care needs and offer support as indicated  2. Assess patient/family knowledge of depression, impact on illness and need for teaching  3. Provide emotional support, presence and reassurance  4. Assess for possible suicidal thoughts or ideation. If patient expresses suicidal thoughts or statements do not leave alone, initiate Suicide Precautions, move to a room close to the nursing station and obtain sitter  5. Initiate 
support systems and cultural and spiritual values     Problem: Depression/Self Harm  Goal: Effect of psychiatric condition will be minimized and patient will be protected from self harm  Description: INTERVENTIONS:  1. Assess impact of patient's symptoms on level of functioning, self care needs and offer support as indicated  2. Assess patient/family knowledge of depression, impact on illness and need for teaching  3. Provide emotional support, presence and reassurance  4. Assess for possible suicidal thoughts or ideation. If patient expresses suicidal thoughts or statements do not leave alone, initiate Suicide Precautions, move to a room close to the nursing station and obtain sitter  5. Initiate consults as appropriate with Mental Health Professional, Spiritual Care, Psychosocial CNS, and consider a recommendation to the [facility] for a Psychiatric Consultation  Outcome: Progressing  Flowsheets  Taken 4/27/2024 1013 by Ursula Dobson RN  Effect of psychiatric condition will be minimized and patient will be protected from self harm: Assess impact of patient’s symptoms on level of functioning, self care needs and offer support as indicated  Taken 4/27/2024 1009 by Ursula Dobson RN  Effect of psychiatric condition will be minimized and patient will be protected from self harm: Assess impact of patient’s symptoms on level of functioning, self care needs and offer support as indicated  Taken 4/26/2024 2022 by Flori Burgos RN  Effect of psychiatric condition will be minimized and patient will be protected from self harm:   Provide emotional support, presence and reassurance   Assess for suicidal thoughts or ideation. If patient expresses suicidal thoughts or statements do not leave alone, initiate Suicide Precautions, move near nurse station, obtain sitter   Assess patient/family knowledge of depression, impact on illness and need for teaching     Problem: Involuntary Admit  Goal: Will cooperate with staff 
verbalize anxieties, fears, and concerns, and demonstrate effective coping: Assist patient/family to identify coping skills, available support systems and cultural and spiritual values     Problem: Depression/Self Harm  Goal: Effect of psychiatric condition will be minimized and patient will be protected from self harm  Description: INTERVENTIONS:  1. Assess impact of patient's symptoms on level of functioning, self care needs and offer support as indicated  2. Assess patient/family knowledge of depression, impact on illness and need for teaching  3. Provide emotional support, presence and reassurance  4. Assess for possible suicidal thoughts or ideation. If patient expresses suicidal thoughts or statements do not leave alone, initiate Suicide Precautions, move to a room close to the nursing station and obtain sitter  5. Initiate consults as appropriate with Mental Health Professional, Spiritual Care, Psychosocial CNS, and consider a recommendation to the [facility] for a Psychiatric Consultation  4/28/2024 2226 by Khloe Yang, RN  Outcome: Progressing  Flowsheets (Taken 4/28/2024 2226)  Effect of psychiatric condition will be minimized and patient will be protected from self harm: Provide emotional support, presence and reassurance  4/28/2024 0946 by Ursula Dobson, RN  Outcome: Progressing  Flowsheets (Taken 4/28/2024 0944)  Effect of psychiatric condition will be minimized and patient will be protected from self harm: Assess impact of patient’s symptoms on level of functioning, self care needs and offer support as indicated     Problem: Involuntary Admit  Goal: Will cooperate with staff recommendations and doctor's orders and will demonstrate appropriate behavior  Description: INTERVENTIONS:  1. Treat underlying conditions and offer medication as ordered  2. Educate regarding involuntary admission procedures and rules  3. Contain excessive/inappropriate behavior per unit and hospital policies  4/28/2024 2226 by 
will be minimized and patient will be protected from self harm:   Provide emotional support, presence and reassurance   Assess for suicidal thoughts or ideation. If patient expresses suicidal thoughts or statements do not leave alone, initiate Suicide Precautions, move near nurse station, obtain sitter   Assess patient/family knowledge of depression, impact on illness and need for teaching     Problem: Involuntary Admit  Goal: Will cooperate with staff recommendations and doctor's orders and will demonstrate appropriate behavior  Description: INTERVENTIONS:  1. Treat underlying conditions and offer medication as ordered  2. Educate regarding involuntary admission procedures and rules  3. Contain excessive/inappropriate behavior per unit and hospital policies  Outcome: Progressing     
Assess patient/family knowledge of depression, impact on illness and need for teaching   Provide emotional support, presence and reassurance   Assess for suicidal thoughts or ideation. If patient expresses suicidal thoughts or statements do not leave alone, initiate Suicide Precautions, move near nurse station, obtain sitter  Taken 4/29/2024 1039  Effect of psychiatric condition will be minimized and patient will be protected from self harm:   Assess impact of patient’s symptoms on level of functioning, self care needs and offer support as indicated   Assess patient/family knowledge of depression, impact on illness and need for teaching   Provide emotional support, presence and reassurance  4/28/2024 2226 by Khloe Yang, RN  Outcome: Progressing  Flowsheets (Taken 4/28/2024 2226)  Effect of psychiatric condition will be minimized and patient will be protected from self harm: Provide emotional support, presence and reassurance     Problem: Involuntary Admit  Goal: Will cooperate with staff recommendations and doctor's orders and will demonstrate appropriate behavior  Description: INTERVENTIONS:  1. Treat underlying conditions and offer medication as ordered  2. Educate regarding involuntary admission procedures and rules  3. Contain excessive/inappropriate behavior per unit and hospital policies  4/29/2024 1051 by Keesha Gates, RN  Outcome: Progressing  Flowsheets (Taken 4/29/2024 1039)  Will cooperate with staff recommendations and doctor's orders and will demonstrate appropriate behavior:   Treat underlying conditions and offer medication as ordered   Contain excessive/inappropriate behavior per unit and hospital policies   Educate regarding involuntary admission procedures and rules  4/28/2024 2226 by Khloe Yang, RN  Outcome: Progressing     Problem: Discharge Planning  Goal: Discharge to home or other facility with appropriate resources  4/29/2024 1051 by Keesha Gates RN  Outcome: 
Progressing  Flowsheets  Taken 4/21/2024 1342 by Keesha Gates, RN  Effect of psychiatric condition will be minimized and patient will be protected from self harm:   Assess impact of patient’s symptoms on level of functioning, self care needs and offer support as indicated   Provide emotional support, presence and reassurance   Assess for suicidal thoughts or ideation. If patient expresses suicidal thoughts or statements do not leave alone, initiate Suicide Precautions, move near nurse station, obtain sitter  Taken 4/21/2024 1333 by Keesha Gates RN  Effect of psychiatric condition will be minimized and patient will be protected from self harm:   Assess impact of patient’s symptoms on level of functioning, self care needs and offer support as indicated   Provide emotional support, presence and reassurance   Assess for suicidal thoughts or ideation. If patient expresses suicidal thoughts or statements do not leave alone, initiate Suicide Precautions, move near nurse station, obtain sitter     Problem: Involuntary Admit  Goal: Will cooperate with staff recommendations and doctor's orders and will demonstrate appropriate behavior  Description: INTERVENTIONS:  1. Treat underlying conditions and offer medication as ordered  2. Educate regarding involuntary admission procedures and rules  3. Contain excessive/inappropriate behavior per unit and hospital policies  4/22/2024 0924 by David Walker, RN  Outcome: Progressing  4/21/2024 2242 by Flori Burgos, RN  Outcome: Progressing  Flowsheets (Taken 4/21/2024 1333 by Keesha Gates, RN)  Will cooperate with staff recommendations and doctor's orders and will demonstrate appropriate behavior:   Educate regarding involuntary admission procedures and rules   Treat underlying conditions and offer medication as ordered   Contain excessive/inappropriate behavior per unit and hospital policies     
level of functioning, self care needs and offer support as indicated     Problem: Involuntary Admit  Goal: Will cooperate with staff recommendations and doctor's orders and will demonstrate appropriate behavior  Description: INTERVENTIONS:  1. Treat underlying conditions and offer medication as ordered  2. Educate regarding involuntary admission procedures and rules  3. Contain excessive/inappropriate behavior per unit and hospital policies  Outcome: Progressing  Flowsheets (Taken 4/26/2024 5670)  Will cooperate with staff recommendations and doctor's orders and will demonstrate appropriate behavior:   Treat underlying conditions and offer medication as ordered   Educate regarding involuntary admission procedures and rules   Contain excessive/inappropriate behavior per unit and hospital policies     Problem: Discharge Planning  Goal: Discharge to home or other facility with appropriate resources  Outcome: Progressing     
or statements do not leave alone, initiate Suicide Precautions, move near nurse station, obtain sitter     Problem: Involuntary Admit  Goal: Will cooperate with staff recommendations and doctor's orders and will demonstrate appropriate behavior  Description: INTERVENTIONS:  1. Treat underlying conditions and offer medication as ordered  2. Educate regarding involuntary admission procedures and rules  3. Contain excessive/inappropriate behavior per unit and hospital policies  Outcome: Progressing  Flowsheets (Taken 4/21/2024 1333 by Keesha Gates, RN)  Will cooperate with staff recommendations and doctor's orders and will demonstrate appropriate behavior:   Educate regarding involuntary admission procedures and rules   Treat underlying conditions and offer medication as ordered   Contain excessive/inappropriate behavior per unit and hospital policies     
David Walker RN  Effect of psychiatric condition will be minimized and patient will be protected from self harm:   Assess impact of patient’s symptoms on level of functioning, self care needs and offer support as indicated   Assess patient/family knowledge of depression, impact on illness and need for teaching   Provide emotional support, presence and reassurance   Assess for suicidal thoughts or ideation. If patient expresses suicidal thoughts or statements do not leave alone, initiate Suicide Precautions, move near nurse station, obtain sitter  4/25/2024 0726 by David Walker RN  Outcome: Progressing     Problem: Involuntary Admit  Goal: Will cooperate with staff recommendations and doctor's orders and will demonstrate appropriate behavior  Description: INTERVENTIONS:  1. Treat underlying conditions and offer medication as ordered  2. Educate regarding involuntary admission procedures and rules  3. Contain excessive/inappropriate behavior per unit and hospital policies  4/25/2024 2053 by Steffanie Berrios RN  Outcome: Progressing  Flowsheets  Taken 4/25/2024 2048 by Steffanie Berrios RN  Will cooperate with staff recommendations and doctor's orders and will demonstrate appropriate behavior: Contain excessive/inappropriate behavior per unit and hospital policies  Taken 4/25/2024 1359 by David Walker RN  Will cooperate with staff recommendations and doctor's orders and will demonstrate appropriate behavior: Educate regarding involuntary admission procedures and rules  4/25/2024 0726 by David Walker RN  Outcome: Progressing     Problem: Discharge Planning  Goal: Discharge to home or other facility with appropriate resources  Outcome: Progressing

## 2024-04-30 NOTE — DISCHARGE INSTRUCTIONS
Due to the Covid-19 Pandemic, [facility] is not currently available. For assistance with quitting smoking please go to https://smokefree.gov. A prescription for an FDA-approved tobacco cessation medication was offered at discharge and the patient refused.    Someone from [facility] will be calling you tomorrow to follow up on your care. If you don't hear from us, give us a call! [phone].    Keep all follow up appointments, take medications as ordered, utilize positive supports, abstain from use of alcohol and drugs. If symptoms return or you feel at risk to yourself or others, please call 911, return the nearest emergency room, or call your local crisis hotline:  [facility]: [phone]  [facility]: [phone]  [facility]: [phone]    You were offered the flu vaccine on 4/30/24 and you declined to receive it.

## 2024-04-30 NOTE — DISCHARGE SUMMARY
MINIPRESS  Take 1 capsule by mouth nightly     propranolol 60 MG extended release capsule  Commonly known as: INDERAL LA  Take 1 capsule by mouth daily     traZODone 50 MG tablet  Commonly known as: DESYREL  Take 1 tablet by mouth nightly as needed for Sleep            STOP taking these medications      ICAPS AREDS FORMULA PO               Where to Get Your Medications        These medications were sent to [facility] [place], [place] - [address] - P [phone] - F [phone]  [address] [place] [address] [place] [address]      Phone: [phone]   divalproex 500 MG DR tablet  methIMAzole 10 MG tablet  OLANZapine 5 MG tablet  prazosin 2 MG capsule  propranolol 60 MG extended release capsule  traZODone 50 MG tablet           Reason for more than one antipsychotic:   [x] N/A  [] 3 failed monotherapy(drugs tried):  [] Cross over to a new antipsychotic  [] Taper to monotherapy from polypharmacy  [] Augmentation of Clozapine therapy due to treatment resistance to single therapy        TIME SPEND - 35 MINUTES TO COMPLETE THE EVALUATION, DISCHARGE SUMMARY, MEDICATION RECONCILIATION AND FOLLOW UP CARE     Signed:  FOZIA RODRIGUEZ MD  4/30/2024  9:14 AM
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
## 2024-04-30 NOTE — GROUP NOTE
Group Therapy Note    Date: 4/21/2024    Group Start Time: 1000  Group End Time: 1100  Group Topic: Activity    [facility] 3[facility] Gale Parker        Group Therapy Note    Attendees: 13       Patient's Goal:  To have positive thoughts     Notes:  Pt was attentive     Status After Intervention:  Unchanged    Participation Level: Interactive    Participation Quality: Attentive      Speech:  normal      Thought Process/Content: Logical      Affective Functioning: Congruent      Mood: euthymic      Level of consciousness:  Alert      Response to Learning: Able to verbalize current knowledge/experience      Endings: None Reported    Modes of Intervention: Activity      Discipline Responsible: Behavorial Health Tech      Signature:  Gale Rodriguez    
                                                                      Group Therapy Note    Date: 4/21/2024    Group Start Time: 1930  Group End Time: 2000  Group Topic: Wrap-Up    [facility] Calli Randhawa, RN        Group Therapy Note    Attendees: 21       Patient's Goal:  Have Positive Thoughts    Notes:  Goal Met    Status After Intervention:  Improved    Participation Level: Active Listener    Participation Quality: Appropriate      Speech:  normal      Thought Process/Content: Logical      Affective Functioning: Congruent      Mood: euthymic      Level of consciousness:  Alert      Response to Learning: Able to verbalize current knowledge/experience      Endings: None Reported    Modes of Intervention: Education, Support, and Socialization      Discipline Responsible: Registered Nurse      Signature:  Calli Martinez RN    
                                                                      Group Therapy Note    Date: 4/22/2024    Group Start Time: 1210  Group End Time: 1250  Group Topic: Activity    [facility] Jill Corral        Group Therapy Note    Attendees: 9/22         Notes:  Pt did not attend today's coping skills group.      Modes of Intervention: Activity      Discipline Responsible: Behavorial Health Tech      Signature:  Jill Howe    
                                                                      Group Therapy Note    Date: 4/22/2024    Group Start Time: 1920  Group End Time: 2000  Group Topic: Recreational    [facility] Sarah Hargrove        Group Therapy Note    Attendees: 16/22       Patient's Goal:  Pt was able to socialize while playing pictionary        Status After Intervention:  Unchanged    Participation Level: Interactive    Participation Quality: Attentive      Speech:  normal      Thought Process/Content: Logical      Affective Functioning: Congruent      Mood: euthymic      Level of consciousness:  Attentive      Response to Learning: Able to verbalize current knowledge/experience      Endings: None Reported    Modes of Intervention: Activity      Discipline Responsible: PCA      Signature:  Sarah Garrett    
                                                                      Group Therapy Note    Date: 4/22/2024    Group Start Time: 1920  Group End Time: 2000  Group Topic: Wrap-Up    [facility] Sarah Hargrove        Group Therapy Note    Attendees: 14/22       Patient's Goal:  Pt goal is to feel better about self        Status After Intervention:  Unchanged    Participation Level: Interactive    Participation Quality: Attentive      Speech:  normal      Thought Process/Content: Logical      Affective Functioning: Congruent      Mood: euthymic      Level of consciousness:  Attentive      Response to Learning: Able to verbalize current knowledge/experience      Endings: None Reported    Modes of Intervention: Support      Discipline Responsible: PCA      Signature:  Sarah Garrett    
                                                                      Group Therapy Note    Date: 4/23/2024    Group Start Time: 1125  Group End Time: 1145  Group Topic: Activity    [facility] Jill Corral        Group Therapy Note    Attendees: 12/20     Notes:  Pt did not meet with our volunteer therapy dog Carly today.     Modes of Intervention: Activity      Discipline Responsible: Behavorial Health Tech      Signature:  Jill Howe    
                                                                      Group Therapy Note    Date: 4/23/2024    Group Start Time: 1210  Group End Time: 1300  Group Topic: Activity    [facility] 3[facility] Jill Corral        Group Therapy Note    Attendees: 8/20       Notes:  Pt attended the nursing student's yoga, meditation, and pictionary group. Pt was an active member of the entire group. Pt participated in each stretch and meditation technique. Pt tried things even if they seemed more difficult than what she was used to. Pt played pictionary with group. Pt guessed more than half of the game by herself. Pt stated \" I play a lot of games at home\", and mentioned that pictionary was one of the games they play often. Pt went up to the board to draw a few pictures for the group to guess. Pt seemed to enjoy group and playing a game with peers.     Status After Intervention:  Improved    Participation Level: Active Listener and Interactive    Participation Quality: Appropriate, Attentive, Sharing, and Supportive      Speech:  normal      Thought Process/Content: Logical      Affective Functioning: Congruent      Mood: Attentive and Happy       Level of consciousness:  Alert, Oriented x4, and Attentive      Response to Learning: Able to verbalize current knowledge/experience      Endings: None Reported    Modes of Intervention: Activity      Discipline Responsible: Behavorial Health Tech      Signature:  Jill Howe    
                                                                      Group Therapy Note    Date: 4/23/2024    Group Start Time: 1400  Group End Time: 1500  Group Topic: Cognitive Skills    Marjorie Cortes MSW, LSW        Group Therapy Note    Attendees: 10       Patient's Goal:  building strengths and resilience    Notes:  Shruthi shared with the group positives about who she is and what she can do.    Status After Intervention:  Improved    Participation Level: Interactive    Participation Quality: Appropriate      Speech:  normal      Thought Process/Content: Logical      Affective Functioning: Congruent      Mood:  calm      Level of consciousness:  Alert      Response to Learning: Able to verbalize current knowledge/experience      Endings: None Reported    Modes of Intervention: Education      Discipline Responsible: /Counselor      Signature:  NEELA Johnson, LSJEWEL    
                                                                      Group Therapy Note    Date: 4/24/2024    Group Start Time: 1000  Group End Time: 1050  Group Topic: Art Therapy     [facility] Jennifer Redman, VIVIW        Group Therapy Note    Attendees: 12       Patient's Goal:  To do better than yesterday.    Notes:  Patient attended the morning group art therapy session. She appeared to have excellent artistic skills. Patient used painting and drawing to create an image that was aesthetically pleasing. She was social with staff and peers. Patient seemed to enjoy the session.     Status After Intervention:  Improved    Participation Level: Active Listener    Participation Quality: Appropriate      Speech:  normal      Thought Process/Content: Logical      Affective Functioning: Congruent      Mood: elevated      Level of consciousness:  Alert      Response to Learning: Able to verbalize current knowledge/experience      Endings: None Reported    Modes of Intervention: Activity      Discipline Responsible: Psychoeducational Specialist      Signature:  Jennifer HERNÁNDEZ    
                                                                      Group Therapy Note    Date: 4/25/2024    Group Start Time: 0200  Group End Time: 0300  Group Topic:  Group    [facility] Karen Cloud LSW        Group Therapy Note    Attendees: 8/15     Note: Patient did not attend group.      Modes of Intervention: Education      Discipline Responsible: /Counselor      Signature:  CUONG Denney    
                                                                      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       Modes of Intervention: Activity      Discipline Responsible: Behaverento Tech      Signature:  Jill Howe    
                                                                      Group Therapy Note    Date: 4/26/2024    Group Start Time: 2000  Group End Time: 2030  Group Topic: Wrap-Up    [facility] Iva Orellana RN; Flori Burgos RN        Group Therapy Note    Attendees: 9/10       Patient's Goal:  :just have a good day\"     Notes:  Goal Met    Status After Intervention:  Improved    Participation Level: Active Listener and Interactive    Participation Quality: Appropriate, Attentive, and Sharing      Speech:  normal      Thought Process/Content: Logical      Affective Functioning: Congruent      Mood: euthymic      Level of consciousness:  Alert, Oriented x4, and Attentive      Response to Learning: Able to verbalize current knowledge/experience      Endings: None Reported    Modes of Intervention: Support and Socialization      Discipline Responsible: Registered Nurse      Signature:  Iva Francois RN    
                                                                      Group Therapy Note    Date: 4/27/2024    Group Start Time: 1200  Group End Time: 1245  Group Topic: Cognitive Skills    [facility]    , GEOVANNY Valiente        Group Therapy Note    Attendees: 5/10         Notes:  Active participant, appropriate behavior.     Status After Intervention:  Improved    Participation Level: Interactive    Participation Quality: Appropriate, Attentive, Sharing, and Supportive      Speech:  normal      Thought Process/Content: Logical      Affective Functioning: Congruent      Mood: euthymic      Level of consciousness:  Alert and Oriented x4      Response to Learning: Progressing to goal      Endings: None Reported    Modes of Intervention: Education, Support, and Problem-solving      Discipline Responsible: Registered Nurse      Signature:  Steffanie Berrios, RN  
                                                                      Group Therapy Note    Date: 4/28/2024    Group Start Time: 1000  Group End Time: 1050  Group Topic: Art Therapy     [facility] Jennifer Redman, VIVIW        Group Therapy Note    Attendees: 6       Patient's Goal:  To be positive about myself.    Notes:  Patient attended the morning group art therapy session. She began the group hand coloring but then made a shift to water coloring painting. Patient made a beautiful painting of underwater sea life. She stated that she paints quite often at home. Patient benefited greatly from this session.     Status After Intervention:  Improved    Participation Level: Active Listener    Participation Quality: Appropriate      Speech:  normal      Thought Process/Content: Logical      Affective Functioning: Congruent      Mood: elevated      Level of consciousness:  Alert      Response to Learning: Able to verbalize current knowledge/experience      Endings: None Reported    Modes of Intervention: Activity      Discipline Responsible: Psychoeducational Specialist      Signature:  Jennifer Batres MA ATR    
                                                                      Group Therapy Note    Date: 4/28/2024    Group Start Time: 1600  Group End Time: 1645  Group Topic: Healthy Living/Wellness    [facility] I    , GEOVANNY Valiente; Ursula Dobson RN        Group Therapy Note    Attendees: 8/16         Notes:  Appropriate behavior.     Status After Intervention:  Improved    Participation Level: Interactive    Participation Quality: Appropriate      Speech:  normal      Thought Process/Content: Logical      Affective Functioning: Congruent      Mood: euthymic      Level of consciousness:  Alert and Oriented x4      Response to Learning: Progressing to goal      Endings: None Reported    Modes of Intervention: Support and Socialization      Discipline Responsible: Registered Nurse      Signature:  Steffanie Berrios, RN  
                                                                      Group Therapy Note    Date: 4/29/2024    Group Start Time: 1000  Group End Time: 1100  Group Topic: Art Therapy     [facility] 3[facility] Jennifer Redman, VIVIW        Group Therapy Note    Attendees: 11       Patient's Goal:  To go home.     Notes:  Patient attended the morning group art therapy session. She created an extraordinary image using oil pastels. The drawing was a picture of the universe. Patient did an exceptional job of utilizing the material. She clearly benefited from the session.     Status After Intervention:  Improved    Participation Level: Active Listener    Participation Quality: Appropriate      Speech:  normal      Thought Process/Content: Logical      Affective Functioning: Congruent      Mood: elevated      Level of consciousness:  Alert      Response to Learning: Able to verbalize current knowledge/experience      Endings: None Reported    Modes of Intervention: Activity      Discipline Responsible: Psychoeducational Specialist      Signature:  Jennifer Batres MA ATR    
                                                                      Group Therapy Note    Date: 4/29/2024    Group Start Time: 1430  Group End Time: 1510  Group Topic: Activity    [facility] Jill Corral        Group Therapy Note    Attendees: 6/14         Notes:  Pt attended group & engaged in our fear and favorites conversation.     Status After Intervention:  Improved    Participation Level: Active Listener and Interactive    Participation Quality: Appropriate, Attentive, Sharing, and Supportive      Speech:  normal      Thought Process/Content: Logical      Affective Functioning: Congruent      Mood: Attentive       Level of consciousness:  Alert and Oriented x4      Response to Learning: Able to verbalize current knowledge/experience      Endings: None Reported    Modes of Intervention: Activity      Discipline Responsible: Behavorial Health Tech      Signature:  Jill Howe    
Date: 4/22/2024    Group Start Time: 0945  Group End Time: 1025  Group Topic: Music Therapy    [facility] 3[facility] Emely Morton    Le Analysis, Song Discussion, & Recorded Music Listening  Patients will listen to \"Part of Me\" by Mary Rice and identify lyrics that they connect with as well as interpret the overall message of the song. Patients will discuss how to preserve their values in the context of their families' values, friends' values, and societal values. Patients will identify their personal values and brainstorm ideas of how to apply them in daily life.     Focus: Motivation, Personal Values, and Validation/Support    Goals: Improve Mood, Improve Insight/Self-Awareness, Increase Socialization/Community Building, Increase Self-Expression, Improve Attention to Task, Improve Coping Skills/Develop Coping Skills    Patient listened to recorded music and took notes on provided le sheet. Patient listened to peer song discussions but chose not to verbally contribute. Patient nodded in response to peer discussion and maintained a steady gaze toward those speaking. Patient declined to share a value of hers.     Attended: 3/4-full attendance  Patient's Goal: feel better about self     Participation Level: Active Listener and Interactive  Participation Quality: Attentive and Supportive  Affect/Mood: Congruent/Reserved  Speech: normal rate and normal volume   Thought Content/Processes: Linear  Level of consciousness: Alert  Response: Able to verbalize current knowledge/experience  Initial interaction with patient    Discipline Responsible: Music Therapist  Signature: RAKEL Keith, PsychEd Spec  
Date: 4/22/2024    Group Start Time: 1335  Group End Time: 1435  Group Topic: Music Therapy    [facility] 3[facility] ROSS Sharplarissa Emely    \"Music Is...\" Improvisation  Patients will be asked to internally identify how they are currently feeling, and choose from a variety of percussion instruments to help represent that emotion. Patients will begin by maintaining a steady beat, then branch off and show what emotion they're feeling through playing. Patients will then choose an emotion word & corresponding instrument, and invite peers to guess what emotion word they are playing. Patients will discuss the possible benefits of improvisation and how they can be translated into everyday life (coping skills, communication skills, confidence, working as a team).    Guided Relaxation with Music  Patients will be asked to get into a comfortable position and close their eyes/maintain a steady gaze toward the ground. Patients will listen to live piano playing while imagining the sights, smells, noises, feelings, and emotions associated to their identified \"favorite\" or \"safe\" space. Patients will follow musical cues that match a relaxed breathing pattern. Patients will discuss the relaxation experience and group as a whole.     Focus: Coping Skills, Mindfulness Techniques, and Emotion Identification/Regulation    Goals: Improve Mood, Improve Self-Esteem, Increase Socialization/Community Building, Increase Self-Expression, Improve Attention to Task, Improve Coping Skills/Develop Coping Skills, Introduce/Build On Interpersonal Communication Skills, Decrease Impulsivity, Increase Sensory Stimulation    Patient selected the jingle bells to utilize for improvisation and maintained a steady beat with the group. Patient expressed feeling frustrated and played her instrument to represent this. Patient played complex complementary rhythms in response to being asked to play how you feel today. Patient contributed several relevant guesses as to what 
Date: 4/23/2024    Group Start Time: 0930  Group End Time: 1035  Group Topic: Music Therapy    [facility] 3[facility] Emely Morton    Emotion Identification Through Classical Music Listening  Patients will learn and discuss the wheel of emotions, and differences between emotion words. Patients will then listen to a series of classical instrumental pieces and identify what emotion the composer is trying to represent. Patients will be provided with an explanation of different musical qualities (tempo, pitch, rhythm, dynamics) that may aid in describing why they chose a specific emotion. Patients will discuss the similarities between interpreting classical music, and interpreting nonverbal cues/communication.     Pieces used:  \"Walter [place] Overture\" by Siddharth Ibanez  \"The Entertainer\" by Angel Mcbride  \"In the [place] the [place]\" by Tiffanie Sapp  \"Adagio for Strings, Op.11\" by Levi Anton  \"The [place]\" by Camille Saint-Saens  \"March Slav\" by Hannah Olivier  \"The Planets: I. Mars, The Bringer of War\" by Jose L Larkin  \"Symphony No. 94 in G, 'Surprise': II. Andante\" by Ric Christian    Focus: Coping Skills, Interpersonal Communication Skills, and Emotion Identification/Regulation  Goals: Improve Mood, Improve Insight/Self-Awareness, Increase Socialization/Community Building, Increase Self-Expression, Improve Attention to Task, Improve Coping Skills/Develop Coping Skills, Improve Emotion Identification/Regulation Skills    Patient listened to peer introductions and engaged in discussions regarding the wheel of emotions. Patient voiced her opinion on the benefits of knowing how to identify your emotions, and validated peer responses as well. Patient was more social than in previous interactions AEB smiling, laughing, and initiating conversations related to the music. Patient contributed relevant guesses as to what emotion she believed was trying to be represented in the music based off of various musical 
Date: 4/24/2024    Group Start Time: 0910  Group End Time: 0950  Group Topic: Process Group - Inpatient    [facility] 3[facility] Emely Morton    Music Therapy: Processing and Grounding Using Music  During a patient's outburst on the unit, patients present in the group room were encouraged to focus on grounding techniques, mindfulness, and other coping strategies through music led by MT (music therapist). MT provided individualized space for processing as needed and led patients in discussing their feelings about the situation.     Focus: Coping Skills, Mindfulness Techniques, and Validation/Support    Patient verbalized anxiety and overwhelm when a patient had an outburst on the unit. Patient withdrew from others and held her knees to her chest while crying. Patient engaged in 1-1 grounding techniques (deep breathing, 5 senses) with MT and accepted support from peers. Patient reported still feeling anxious following the exercises, but appeared to be less elevated AEB having feet on the ground (as opposed to curled up in chair), engaging with peers, and clearer speech. Patient gradually calmed down in the time following group.    Attended: Affect/Mood: Congruent/Anxious, Fearful, Tearful, and Triggered  Speech: normal rate and normal volume   Thought Content/Processes: Circumstantial and Thought blocking  Level of consciousness: Alert / Preoccupied  Outcomes: Able to engage in interactions, Able to listen to others, Able to manage/cope with feelings, Able to receive feedback, and Identified feelings    Discipline Responsible: Music Therapist  Signature: Emely Madsen MT-BC, PsychEd Spec  
Date: 4/25/2024    Group Start Time: 0945  Group End Time: 1035  Group Topic: Music Therapy    [facility] 3W Emely Morton    Group and Individual Songwriting: A Musical Letter of Support  Patients will listen to \"I Can See Clearly Now\" by Gamaliel Michele and fill in a 'Mad Libs' style cherry substitution. Patients will be encouraged to discuss the cherry substitution experience as a group. Patients will listen to \"Hey Ty\" by the Beatles and discuss the idea of support through difficult times. Patients will then create a Letter of Support based off the lyrics/paolo of \"Hey Ty\" to support themselves. Patients will have the opportunity to hear their version of the song played/sang live by MT, and debrief their feelings regarding the overall experience.     Focus: Creativity, Coping Skills, Motivation, and Validation/Support    Goals: Improve Expressive Communication/Self-Expression, Improve Self-Esteem/Decrease Negative Self-Talk, Improve Mood, Increase Group Cohesion/Socialization, Develop Coping Skills, Improve Self-Awareness/Insight, Increase Attention to Task/Decrease Impulsive Behaviors     Attendance: Did not attend    Discipline Responsible: Music Therapist  Signature: Emely Madsen, MT-BC, PsychEd Spec  
Date: 4/26/2024    Group Start Time: 0940  Group End Time: 1050  Group Topic: Music Therapy    [facility] 3W Emely Morton    Active Music Making, Live Music Listening, and Music Reminiscence  Patients will be given a songbook and offered the opportunity to select (a) song(s) of their choice for live music listening on [employer]. Patients will be encouraged to sing along, move along, and listen to the music. Patients will be asked to reflect on the potential benefits of engaging with preferred live music.     Focus: Self-Expression, Processing, Self-Esteem, Coping Skills, and Building Positive Experiences    Goals: Improve Mood, Decrease Isolation, Increase Sense of Community/Socialization, Improve Self-Esteem, Increase Self-Expression, Provide Sense of Autonomy, Develop Coping Skills    Patient selected several songs for live music listening. Patient verbalized support for peers' song choices and sang along/moved to familiar music. Patient socialized with peers/staff as related to the music. Patient shared that she felt a lot of emotional \"energy\" released from her while singing, and appeared to be more relaxed. Patient mood improved throughout group, as patient was smiling, laughing, and engaging with music.     Attended: 3/4-full attendance  Participation Level: Active Listener and Interactive  Participation Quality: Attentive, Sharing, and Supportive  Affect/Mood: Constricted/Irritable  Speech: normal rate and normal volume   Thought Content/Processes: Linear  Level of consciousness: Alert  Response: Able to verbalize current knowledge/experience  Outcomes: Successfully internalized the purpose of intervention and Actively participated in the group experience    Discipline Responsible: Music Therapist  Signature: Emely Madesn, RAKEL, PsychEd Spec  
Date: 4/26/2024    Group Start Time: 1200  Group End Time: 1250  Group Topic: Psychoeducation    [facility] 3W Emely Morton    \"Drive\" by Incubus Le Analysis and Discussion  Patients will listen to \"Drive\" by Incubus and discuss relevant lyrics/themes they connect with. Patients will identify things that are in their control and out of their control. Patients will be encouraged to share with the group and discuss how to have more control over their future.    Coping Skills and Grounding Techniques  Patients will discuss and learn different grounding techniques and coping skills in the context of managing stress when it comes to things out of our control. Patients will engage in grounding techniques as a group and discuss the experience after.     Focus: Creativity, Coping Skills, and Insight / Self-Awareness    Goals: Improve Expressive Communication/Self-Expression, Improve Self-Esteem, Decrease Negative Self-Talk/Cognitive Distortions, Improve Mood, Increase Group Cohesion/Socialization, Develop Coping Skills, Improve Self-Awareness/Insight, Increase Attention to Task, Decrease Impulsive Behaviors    Patient listened to recorded music and contributed to peer song discussions. Patient resonated with the lyrics \"Whatever tomorrow brings, I'll be there\" and reflected that \"I wanna be here tomorrow.\" Patient demonstrated insight AEB discussing how she wants to learn how to \"cope in the moment\" and \"rewire\" her brain to create healthier ways to express herself/manage stress. Patient was future-focused and invested in group. Patient identified things in  her control (my reactions, my health, and how I treat others), and things out of her control (the world, others' opinions, tragic life events). Patient engaged in coping skills and practiced grounding techniques. Patient took notes of coping skills that were new to her, and reported feeling better following group. Patient was brighter and more optimistic in this 
Date: 4/26/2024    Group Start Time: 1435  Group End Time: 1525  Group Topic: Music Therapy    [facility] Emely Morton Jeoparjavier  Patients will be asked to select a category/difficulty level for music-based questions in the style of \"Jeopardy!\". Patients will be asked to identify a song title, artist, movie, TV show, etc. after listening to a recorded version of a song. Patients will choose between competing in teams or working together as a group.    Focus: Building Positive Experiences, Socialization  Goals: Improve/Maintain Cognitive Skills, Increase Socialization, Improve Mood, Increase Self-Esteem, Increase Self-Expression, Promote A Sense of Autonomy, Improve Attention to Task, Decrease Impulsive Behaviors     Patient collaborated with peers in selecting categories/difficulty levels, and answering music-based trivia questions. Patient contributed relevant guesses and encouraged peers. Patient socialized and was brighter in this group compared to previous interactions AEB smiling, laughing, initiating conversations with peers, and speaking in a more animated tone of voice. Patient appeared confident and expressed that she enjoyed the experience.     Attended: Full attendance  Participation Level: Active Listener and Interactive  Participation Quality: Attentive, Sharing, and Supportive  Affect/Mood: Congruent/Euthymic and Brightens  Speech: spontaneous, normal rate, and normal volume   Thought Content/Processes: Linear  Level of consciousness: Alert  Response: Able to verbalize current knowledge/experience and Able to verbalize/acknowledge new learning  Outcomes: Progressing towards goal, Successfully internalized the purpose of intervention, and Actively participated in the group experience  Modes of Intervention: Music-Based Cognitive Experience    Discipline Responsible: Music Therapist  Signature: RAKEL Keith, PsychEd Spec  
Date: 4/29/2024    Group Start Time: 0905  Group End Time: 0935  Group Topic: Music Therapy    ML 3W Emely Morton    Le Analysis and Song Discussions  Patients will listen to the song \"I Choose\" by Zulema Currie and discuss lyrics/interpretations derived from the song. Patients will identify things they can choose to support their physical needs, emotional needs, and social needs.     Focus: Coping Skills and Validation/Support    Goals: Improve Mood, Improve Insight/Self-Awareness, Increase Socialization/Community Building, Increase Self-Expression, Improve Attention to Task, Improve Coping Skills/Develop Coping Skills    Patient listened to recorded music and engaged in peer song discussions. Patient resonated with several lyrics, including \"Gotta get this off my chest, I gotta let it go today\" and elaborated that since she's been here, she's been able to get it off her chest and \"able to talk to somebody.\" Patient verbalized support for peers' contributions. Patient discussed that change is not always bad, and that others can push you to be better. Patient identified that she could address her physical needs today by taking her meds and address her emotional/social needs by making new friends.     Attended: 3/4-full attendance and With staff  Patient's Goal: \"go home\"     Participation Level: Active Listener and Interactive  Participation Quality: Attentive, Sharing, and Supportive  Affect/Mood: Congruent/Euthymic  Speech: spontaneous, normal rate, and normal volume   Thought Content/Processes: Linear  Level of consciousness: Alert  Response: Able to verbalize current knowledge/experience and Able to verbalize/acknowledge new learning  Outcomes: Progressing towards goal, Successfully internalized the purpose of intervention, and Actively participated in the group experience    Discipline Responsible: Music Therapist  Signature: Emely Madsen, MT-BC, PsychEd Spec  
Date: 4/30/2024    Group Start Time: 0935  Group End Time: 1045  Group Topic: Psychoeducation    [facility] 3W Emely Morton    The Stages of Change through a Musical Lens  Patients will listen to \"It's Not the Same Anymore\" by Jluis Koo and discuss lyrics that resonate with them and possible interpretations of the song. Patients will be educated on the Stages of Change (pre-contemplation, contemplation, preparation, action, and maintenance). Patients will be asked to listen to a variety of songs and \"diagnose\" them into a specific stage of change. Patients and MT will discuss why songs might fit into one/multiple stage(s).     Urge Surfing Guided Meditation  Patients will be lead through an urge surfing meditation by MT that includes deep breathing techniques, mindful visualization, and relaxation strategies.     Focus:Coping Skills, Breaking Negative Cycles/Stages of Change, and Substance Abuse Recovery     Goals: Increase Community Building/Socialization, Improve Mood, Increase Future Thinking, Improve Coping Skills, Increase Relaxation, Improve Insight/Self-Awareness, Increase Self-Expression, Improve Attention to Task    Patient listened to live music and commented on several lyrics that she resonated with, including \"I spent a long time, putting up with people, putting on my best face...\" Patient elaborated that she \"put on a mask\" around her family and friends before hospitalization, but reflected that she wants to reach out for help in the future instead of holding it in. Patient discussed the idea of change being scary, uncomfortable, and representative of the unknown, and how people let their pride/ego get in the way of making healthy changes. Patient identified that she is \"in-between\" preparation and action stages of change because she is unsure if the tools she developed here will remain consistent in her everyday life. Patient demonstrated insight in realizing that she needs help managing her 
everything he needs is inside him.\" Patient shared that she wants to find that in herself in order to get better in regards to her mental health. Patient became tearful and sang along to familiar music, and reported feeling more at ease following group.     Attended: 3/4-full attendance  Participation Level: Active Listener and Interactive  Participation Quality: Attentive, Sharing, and Supportive  Affect/Mood: Constricted/Elevated  Speech: spontaneous, normal rate, and normal volume   Thought Content/Processes: Linear  Level of consciousness: Alert  Response: Able to verbalize current knowledge/experience and Able to verbalize/acknowledge new learning  Outcomes: Successfully internalized the purpose of intervention and Actively participated in the group experience  Modes of Intervention: Music Reminiscence, Playlist Building, and Receptive Music Listening    Discipline Responsible: Music Therapist  Signature: RAKEL Keith, PsychEd Spec

## 2024-04-30 NOTE — TRANSITION OF CARE
Pharmacy to assist with your medications [address] [place] [address]  [phone]               Advanced Directive:   Does the patient have an appointed surrogate decision maker? No  Does the patient have a Medical Advance Directive? No  Does the patient have a Psychiatric Advance Directive? No  If the patient does not have a surrogate or Medical Advance Directive AND Psychiatric Advance Directive, the patient was offered information on these advance directives Patient declined to complete    Patient Instructions: Please continue all medications until otherwise directed by physician.      Tobacco Cessation Discharge Plan:   Is the patient a tobacco user  and needs referral for tobacco cessation? No pt denies being a smoker  Patient referred to the following for tobacco cessation with an appointment? No  Patient was offered medication to assist with tobacco cessation at discharge? No    Alcohol/Substance Abuse Discharge Plan:   Does the patient have a history of substance/alcohol abuse and requires a referral for treatment?  No pt denies being a drinker of alcohol   Patient referred to the following for substance/alcohol abuse treatment with an appointment? No pt denies drug use  Patient was offered medication to assist with alcohol cessation at discharge? Patient refused      Patient discharged to: Home; provided to the patient/caregiver either in hard copy or electronically.

## 2024-05-08 ENCOUNTER — OFFICE VISIT (OUTPATIENT)
Dept: INTERNAL MEDICINE | Age: 44
End: 2024-05-08

## 2024-05-08 VITALS
BODY MASS INDEX: 25.99 KG/M2 | DIASTOLIC BLOOD PRESSURE: 70 MMHG | HEIGHT: 67 IN | OXYGEN SATURATION: 97 % | HEART RATE: 84 BPM | WEIGHT: 165.6 LBS | RESPIRATION RATE: 16 BRPM | SYSTOLIC BLOOD PRESSURE: 110 MMHG

## 2024-05-08 DIAGNOSIS — Z12.31 SCREENING MAMMOGRAM FOR BREAST CANCER: ICD-10-CM

## 2024-05-08 DIAGNOSIS — F31.63 SEVERE MIXED BIPOLAR DISORDER (HCC): Primary | ICD-10-CM

## 2024-05-08 DIAGNOSIS — Z86.59 HISTORY OF SUICIDAL IDEATION: ICD-10-CM

## 2024-05-08 DIAGNOSIS — E05.90 HYPERTHYROIDISM: ICD-10-CM

## 2024-05-08 RX ORDER — CETIRIZINE HYDROCHLORIDE 10 MG/1
10 TABLET ORAL DAILY
COMMUNITY

## 2024-05-08 SDOH — ECONOMIC STABILITY: HOUSING INSECURITY
IN THE LAST 12 MONTHS, WAS THERE A TIME WHEN YOU DID NOT HAVE A STEADY PLACE TO SLEEP OR SLEPT IN A SHELTER (INCLUDING NOW)?: NO

## 2024-05-08 SDOH — ECONOMIC STABILITY: FOOD INSECURITY: WITHIN THE PAST 12 MONTHS, YOU WORRIED THAT YOUR FOOD WOULD RUN OUT BEFORE YOU GOT MONEY TO BUY MORE.: NEVER TRUE

## 2024-05-08 SDOH — ECONOMIC STABILITY: INCOME INSECURITY: HOW HARD IS IT FOR YOU TO PAY FOR THE VERY BASICS LIKE FOOD, HOUSING, MEDICAL CARE, AND HEATING?: NOT HARD AT ALL

## 2024-05-08 SDOH — ECONOMIC STABILITY: FOOD INSECURITY: WITHIN THE PAST 12 MONTHS, THE FOOD YOU BOUGHT JUST DIDN'T LAST AND YOU DIDN'T HAVE MONEY TO GET MORE.: NEVER TRUE

## 2024-05-08 NOTE — PROGRESS NOTES
[facility]  [address] [facility] [address]  Dept: [phone]  Dept Fax: [phone]  Loc: [phone]     BRINA Patel (: 1980) is a 43 y.o. female, New patient, here for evaluation of the following chief complaint(s):  New Patient (Here to establish. No previous PCP. No specific complaints with health at this time. )      PCP:  No primary care provider on file.      Pt here today to [facility]. Hospital f/u, was admitted -24 for suicidal ideation with plan d/t severe depression. Diagnosed with mixed bipolar disorder, severe while there.     Pt has been depressed since she was 12. She says that she had never been medicated for depression. For the last year her depression has gotten much worse. Didn't want to be alive, didn't feel like anything good in her life. States she doesn't feel like she wants to die anymore, but sometimes still feels sad. Was pink slipped on  after having suicidal ideation with plan, loaded gun. Guns are now locked up and pt has no access to them. She is not suicidal today. Was started on several meds while inpt- was not on anything prior. Has hx of bad nightmares, so knows one med was for that. Has only been on them a couple of weeks. Has never done counseling. Is looking into online platforms for counseling and psychiatry- doesn't want to go to [facility] d/t bad memories there with her daughter.     Hyperthyroidism: found while hospitalized for her mental health on 24. Started on tapazole. Was shaking all the time, hot all the time- a cup of coffee would send her into overdrive, totally cut out caffeine. Could feel palpitations at time,. Knew something was off but didn't know what. New issue to her. Has orders for labs to be done 4-5 days before endocrine appt on , wonders if can do here.     Has never had a mammogram and knows is overdue on

## 2024-05-28 DIAGNOSIS — E05.90 HYPERTHYROIDISM: ICD-10-CM

## 2024-05-28 LAB
T4 FREE SERPL-MCNC: 2.71 NG/DL (ref 0.84–1.68)
TSH SERPL-MCNC: <0.01 UIU/ML (ref 0.44–3.86)

## 2024-05-31 ENCOUNTER — OFFICE VISIT (OUTPATIENT)
Dept: ENDOCRINOLOGY | Age: 44
End: 2024-05-31

## 2024-05-31 VITALS
HEART RATE: 60 BPM | SYSTOLIC BLOOD PRESSURE: 120 MMHG | DIASTOLIC BLOOD PRESSURE: 84 MMHG | BODY MASS INDEX: 27.31 KG/M2 | WEIGHT: 174 LBS | HEIGHT: 67 IN

## 2024-05-31 DIAGNOSIS — E05.00 GRAVES DISEASE: ICD-10-CM

## 2024-05-31 DIAGNOSIS — E05.90 HYPERTHYROIDISM: Primary | ICD-10-CM

## 2024-05-31 DIAGNOSIS — E05.00 GRAVES' ORBITOPATHY: ICD-10-CM

## 2024-05-31 DIAGNOSIS — E06.3 HYPOTHYROIDISM DUE TO HASHIMOTO'S THYROIDITIS: ICD-10-CM

## 2024-05-31 DIAGNOSIS — E03.8 HYPOTHYROIDISM DUE TO HASHIMOTO'S THYROIDITIS: ICD-10-CM

## 2024-05-31 PROCEDURE — 99214 OFFICE O/P EST MOD 30 MIN: CPT | Performed by: PHYSICIAN ASSISTANT

## 2024-05-31 RX ORDER — PROPRANOLOL HCL 60 MG
60 CAPSULE, EXTENDED RELEASE 24HR ORAL DAILY
Qty: 30 CAPSULE | Refills: 3 | Status: SHIPPED | OUTPATIENT
Start: 2024-05-31

## 2024-05-31 RX ORDER — METHIMAZOLE 10 MG/1
20 TABLET ORAL 3 TIMES DAILY
Qty: 180 TABLET | Refills: 5 | Status: SHIPPED | OUTPATIENT
Start: 2024-05-31

## 2024-05-31 ASSESSMENT — ENCOUNTER SYMPTOMS
NAUSEA: 0
ABDOMINAL PAIN: 0
COUGH: 0
SINUS PRESSURE: 0
SORE THROAT: 0
RHINORRHEA: 0
DIARRHEA: 0
WHEEZING: 0
SHORTNESS OF BREATH: 0
VOMITING: 0

## 2024-05-31 NOTE — PROGRESS NOTES
Allergic/Immunologic: Negative for environmental allergies.   Neurological:  Positive for tremors. Negative for dizziness and headaches.   Hematological:  Does not bruise/bleed easily.   Psychiatric/Behavioral:  Negative for dysphoric mood. The patient is nervous/anxious.        Objective:   Physical Exam  Vitals reviewed.   Constitutional:       Appearance: Normal appearance. She is well-developed. She is not ill-appearing.   HENT:      Head: Normocephalic and atraumatic.      Nose: No congestion.   Eyes:      Conjunctiva/sclera: Conjunctivae normal.   Cardiovascular:      Rate and Rhythm: Normal rate and regular rhythm.      Heart sounds: Normal heart sounds.   Pulmonary:      Effort: Pulmonary effort is normal.      Breath sounds: Normal breath sounds.   Abdominal:      General: Bowel sounds are normal.      Palpations: Abdomen is soft.   Musculoskeletal:         General: Normal range of motion.      Cervical back: Normal range of motion and neck supple.   Skin:     General: Skin is warm and dry.   Neurological:      General: No focal deficit present.      Mental Status: She is alert and oriented to person, place, and time.   Psychiatric:         Mood and Affect: Mood normal.           Reviewed with the patient: current clinical status, medications, activities and diet.      Side effects, adverse effects of the medication prescribed today, as well as treatment plan/ rationale and result expectations have been discussed with the patient who expresses understanding and desires to proceed.     Close follow up to evaluate treatment results and for coordination of care.  I have reviewed the patient's medical history in detail and updated the computerized patient record.

## 2024-05-31 NOTE — PATIENT INSTRUCTIONS
Propranolol 60 mg XR daily  Start methimazole 20 mg by mouth 3 times daily  Referral to Dr Armstrong   Labs every 6 weeks   Repeat labs 1 week before your follow-up appointment  Follow-up with me in 3 months      You have been prescribed a medication called methimazole.  It is in the drug class called a thionamide that has shown a rare but serious 0.2-0.5 % risk of sudden neutropenia and agranulocytosis    What is neutropenia?  Neutropenia is condition that happens when your blood does not have enough of the cells called \"neutrophils.\" Neutrophils are a type of white blood cell. They help your body fight infections.  Blood is made up of different types of cells. These cells are made in the center of your bones, in a part called the bone marrow. Neutropenia can happen if:    ?Your bone marrow doesn't make enough neutrophils.    ?Something in your body (such a medicine you took, or your own immune system) destroys some of your neutrophils.    Some people with neutropenia have no symptoms. But people with severe neutropenia can get fevers and frequent infections.    We will order a blood test called a \"complete blood count with white blood cell differential\" at two weeks then in a month after starting the medication that looks at all the different types of white blood cells in your blood. It can show if you have neutropenia. We also call this test a \"CBC with diff.\" The important number is the number of neutrophils, or \"neutrophil count,\" not the percentage.  Idiosyncratic agranulocytosis by definition cannot be predicted. While routine monitoring of blood counts may help detect dose-dependent neutropenia, frequent monitoring offers no protection from idiosyncratic agranulocytosis. Therefore, the most important recommendation to protect patients is educate patients that agranulocytosis may be a rare complication of a drug.    Most cases of severe neutropenia or agranulocytosis present within the first six months and

## 2024-06-03 ENCOUNTER — HOSPITAL ENCOUNTER (OUTPATIENT)
Dept: WOMENS IMAGING | Age: 44
Discharge: HOME | End: 2024-06-05
Payer: COMMERCIAL

## 2024-06-03 DIAGNOSIS — Z12.31 SCREENING MAMMOGRAM FOR BREAST CANCER: ICD-10-CM

## 2024-06-03 PROCEDURE — 77063 BREAST TOMOSYNTHESIS BI: CPT

## 2024-06-05 ENCOUNTER — TELEPHONE (OUTPATIENT)
Dept: INTERNAL MEDICINE | Age: 44
End: 2024-06-05

## 2024-06-05 DIAGNOSIS — R92.8 ABNORMALITY OF RIGHT BREAST ON SCREENING MAMMOGRAM: Primary | ICD-10-CM

## 2024-06-05 NOTE — TELEPHONE ENCOUNTER
Mammogram showed a dense, asymmetrical area on the right. Need additional testing for further images to evaluate. Order for diagnostic mammo and ultrasound placed. Please get scheduled. Left breast was fine.

## 2024-06-18 ENCOUNTER — HOSPITAL ENCOUNTER (OUTPATIENT)
Dept: ULTRASOUND IMAGING | Age: 44
Discharge: HOME | End: 2024-06-20
Payer: COMMERCIAL

## 2024-06-18 ENCOUNTER — HOSPITAL ENCOUNTER (OUTPATIENT)
Dept: WOMENS IMAGING | Age: 44
Discharge: HOME | End: 2024-06-20
Payer: COMMERCIAL

## 2024-06-18 DIAGNOSIS — R92.8 ABNORMALITY OF RIGHT BREAST ON SCREENING MAMMOGRAM: ICD-10-CM

## 2024-06-18 PROCEDURE — G0279 TOMOSYNTHESIS, MAMMO: HCPCS

## 2024-06-18 PROCEDURE — 76641 ULTRASOUND BREAST COMPLETE: CPT

## 2024-06-27 ENCOUNTER — TELEPHONE (OUTPATIENT)
Dept: INTERNAL MEDICINE | Age: 44
End: 2024-06-27

## 2024-06-27 NOTE — PROGRESS NOTES
[facility], [facility] OTOLARYNGOLOGY  [address] [place] [address], [address]  [facility] [address]  Dept: [phone]  Dept Fax: [phone]  Loc: [phone]     6/28/2024    Visit type: New patient    Reason for Visit: New Patient (Ref from Deondre Goddard )       ASSESSMENT/PLAN   1. 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 Dysphagia, unspecified type  3. Laryngopharyngeal reflux (LPR)    We discussed the possibility of total thyroidectomy for toxic multinodular goiter/Graves disease. Surgery described in detail (r/b/a) She is currently on methimazole/propanolol. She has repeat thyroid labs in the next few weeks. Will discuss with Endocrine re: medication options versus proceeding with surgery. She does endorse some compressive symptoms (dysphagia, occasional SOB when laying down). She also has evidence of LPR on scope exam which I will treat with omeprazole.     No follow-ups on file.  Orders Placed This Encounter   Medications    omeprazole (PRILOSEC) 40 MG delayed release capsule     Sig: Take 1 capsule by mouth daily 30 min before dinner     Dispense:  90 capsule     Refill:  1      Subjective    Patient: Shruthi Patel is a 43 y.o. female   HPI:    Referred by: Deondre Goddard PA  I have personally reviewed the note by the referring provider     Presents today for poorly controlled Graves hyperthroidism and goiter    Started methimazole and propanolol    Endorses Dysphagia - needs lots of liquid to get solids down   Coughing and choking with liquids   No voice changes   Sometimes SOB when laying down   No globus sensation     Denies family history of thyroid cancer   Endorses mom had Hashimotos requiring thyroidectomy    No Known Allergies    Current Outpatient Medications:     omeprazole (PRILOSEC) 40 MG delayed release capsule, Take 1 capsule by mouth daily 30 min before dinner, Disp: 90 capsule, Rfl: 1    methIMAzole (TAPAZOLE) 10 MG tablet, Take 2 tablets

## 2024-06-27 NOTE — TELEPHONE ENCOUNTER
Pt did not review my chart message. Please inform:     Diagnostic mammogram came back ok. Repeat regular mammogram in 1 yr

## 2024-06-28 ENCOUNTER — OFFICE VISIT (OUTPATIENT)
Age: 44
End: 2024-06-28

## 2024-06-28 VITALS
OXYGEN SATURATION: 96 % | DIASTOLIC BLOOD PRESSURE: 78 MMHG | HEART RATE: 83 BPM | SYSTOLIC BLOOD PRESSURE: 118 MMHG | TEMPERATURE: 97.6 F | WEIGHT: 173 LBS | BODY MASS INDEX: 27.15 KG/M2 | HEIGHT: 67 IN

## 2024-06-28 DIAGNOSIS — R13.10 DYSPHAGIA, UNSPECIFIED TYPE: ICD-10-CM

## 2024-06-28 DIAGNOSIS — K21.9 LARYNGOPHARYNGEAL REFLUX (LPR): ICD-10-CM

## 2024-06-28 DIAGNOSIS — E05.20 TOXIC MULTINODULAR GOITER WITH NO CRISIS: Primary | ICD-10-CM

## 2024-06-28 RX ORDER — OMEPRAZOLE 40 MG/1
40 CAPSULE, DELAYED RELEASE ORAL DAILY
Qty: 90 CAPSULE | Refills: 1 | Status: SHIPPED | OUTPATIENT
Start: 2024-06-28

## 2024-07-11 DIAGNOSIS — E05.90 HYPERTHYROIDISM: ICD-10-CM

## 2024-07-11 LAB
T4 FREE SERPL-MCNC: 0.5 NG/DL (ref 0.84–1.68)
TSH REFLEX: <0.01 UIU/ML (ref 0.44–3.86)

## 2024-08-09 ENCOUNTER — OFFICE VISIT (OUTPATIENT)
Dept: ENDOCRINOLOGY | Age: 44
End: 2024-08-09

## 2024-08-09 VITALS
SYSTOLIC BLOOD PRESSURE: 104 MMHG | DIASTOLIC BLOOD PRESSURE: 58 MMHG | BODY MASS INDEX: 29.98 KG/M2 | HEIGHT: 67 IN | WEIGHT: 191 LBS | HEART RATE: 60 BPM

## 2024-08-09 DIAGNOSIS — E05.90 HYPERTHYROIDISM: Primary | ICD-10-CM

## 2024-08-09 PROCEDURE — 99214 OFFICE O/P EST MOD 30 MIN: CPT | Performed by: PHYSICIAN ASSISTANT

## 2024-08-09 ASSESSMENT — ENCOUNTER SYMPTOMS
RHINORRHEA: 0
WHEEZING: 0
NAUSEA: 0
SHORTNESS OF BREATH: 0
SORE THROAT: 0
SINUS PRESSURE: 0
COUGH: 0
ABDOMINAL PAIN: 0
VOMITING: 0
DIARRHEA: 0

## 2024-08-09 NOTE — PROGRESS NOTES
CALCIUM 9.6 04/20/2024    BILITOT 0.3 04/20/2024    ALKPHOS 108 04/20/2024    AST 19 04/20/2024    ALT 29 04/20/2024    LABGLOM >90.0 04/20/2024    GLOB 3.5 04/20/2024     Lab Results   Component Value Date    WBC 9.1 04/20/2024    HGB 14.3 04/20/2024    HCT 42.7 04/20/2024    MCV 77.1 (L) 04/20/2024     04/20/2024     Lab Results   Component Value Date    LABA1C 5.2 04/20/2024     Lab Results   Component Value Date    HDL 68 (H) 04/20/2024    CHOL 177 04/20/2024    TRIG 83 04/20/2024     No results found for: \"TESTOSTERONE\", \"SHBG\", \"TESTFREENM\"  Lab Results   Component Value Date    TSH <0.010 (L) 07/11/2024    TSH <0.010 (L) 05/28/2024    TSH <0.010 (L) 04/20/2024    FT3 22.70 (H) 04/21/2024    T4FREE 0.50 (L) 07/11/2024    T4FREE 2.71 (H) 05/28/2024    T4FREE 6.17 (H) 04/21/2024     Lab Results   Component Value Date    TPOABS 686.0 (H) 04/22/2024     Component  Ref Range & Units 4/22/24 1038   TSI  <=0.54 IU/L 3.03 High      EXAMINATION:  Ultrasound Thyroid  4/20/2024  FINDINGS:  Size right thyroid lobe: 6.6 x 2.8 x 3.1 cm  Size left thyroid lobe: 6.2 x 2.9 x 2.6 cm  Size isthmus: 0.9 cm  Texture: Heterogeneous  Increased vascularity of the thyroid gland suggestive of thyroiditis.  Estimated total number of nodules greater than or equal to 1 cm: 4     Nodule#: # 1:   Maximum size: 1.2 cm . All dimensions: 1.2 x 1.2 x 1.2 cm   Location: Right Mid  Composition: solid or almost completely solid: 2 points  Echogenicity: isoechoic: 1 point  Shape: wider than tall: 0 points  Margins: ill-defined: 0 points   Echogenic foci: none: 0 points   ACR Total Points: 3;  ACR TI-RADS risk category: TR3 - mildly suspicious  nodule.     Nodule #: # 2:   Maximum size: 1.0 cm . Size: 0.9 x 0.7 x 1.0 cm   Location: Right Lower   Composition: solid or almost completely solid: 2 points   Echogenicity: hyperechoic: 1 point   Shape: taller than wide: 3 points   Margins: smooth: 0 points  Echogenic foci: none: 0 points  ACR Total

## 2024-08-09 NOTE — PATIENT INSTRUCTIONS
Continue Propranolol 60 mg XR daily  Continue methimazole 20 mg by mouth 3 times daily  Referral to Dr Armstrong - had appointment waiting for labs to normalize for surgery   Labs in 2 weeks, I will make recommendations based on those results   Repeat labs 1 week before your follow-up appointment  Follow-up with me in 3 months

## 2024-08-12 ENCOUNTER — TELEPHONE (OUTPATIENT)
Age: 44
End: 2024-08-12

## 2024-08-12 NOTE — TELEPHONE ENCOUNTER
----- Message from Dr. Mireya Armstrong MD sent at 8/10/2024  6:35 AM EDT -----  Hi, please call the patient and offer a phone visit at the end of the day this/next week so I can discuss surgery. Thanks

## 2024-08-22 DIAGNOSIS — E05.90 HYPERTHYROIDISM: ICD-10-CM

## 2024-08-22 LAB
T4 FREE SERPL-MCNC: 0.46 NG/DL (ref 0.84–1.68)
TSH REFLEX: 3.79 UIU/ML (ref 0.44–3.86)

## 2024-09-04 ENCOUNTER — TELEPHONE (OUTPATIENT)
Age: 44
End: 2024-09-04

## 2024-11-01 DIAGNOSIS — E05.90 HYPERTHYROIDISM: ICD-10-CM

## 2024-11-01 LAB
T4 FREE SERPL-MCNC: 0.75 NG/DL (ref 0.84–1.68)
TSH SERPL-MCNC: 2.82 UIU/ML (ref 0.44–3.86)

## 2024-11-19 RX ORDER — PROPRANOLOL HYDROCHLORIDE 60 MG/1
60 CAPSULE, EXTENDED RELEASE ORAL DAILY
Qty: 30 CAPSULE | Refills: 3 | Status: SHIPPED | OUTPATIENT
Start: 2024-11-19

## 2025-01-29 RX ORDER — OMEPRAZOLE 40 MG/1
40 CAPSULE, DELAYED RELEASE ORAL DAILY
Qty: 90 CAPSULE | Refills: 1 | Status: SHIPPED | OUTPATIENT
Start: 2025-01-29

## 2025-05-27 ENCOUNTER — APPOINTMENT (OUTPATIENT)
Dept: RADIOLOGY | Facility: HOSPITAL | Age: 45
End: 2025-05-27
Payer: MEDICARE

## 2025-05-27 ENCOUNTER — HOSPITAL ENCOUNTER (EMERGENCY)
Facility: HOSPITAL | Age: 45
Discharge: PSYCHIATRIC OR SUBSTANCE USE FACILITY | End: 2025-05-28
Attending: STUDENT IN AN ORGANIZED HEALTH CARE EDUCATION/TRAINING PROGRAM
Payer: MEDICARE
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
## 2025-05-27 DIAGNOSIS — R45.851 SUICIDAL IDEATION: ICD-10-CM

## 2025-05-27 LAB
BASOPHILS # BLD AUTO: 0.04 X10*3/UL (ref 0–0.1)
BASOPHILS NFR BLD AUTO: 0.3 %
EOSINOPHIL # BLD AUTO: 0.24 X10*3/UL (ref 0–0.7)
EOSINOPHIL NFR BLD AUTO: 1.9 %
ERYTHROCYTE [DISTWIDTH] IN BLOOD BY AUTOMATED COUNT: 12.8 % (ref 11.5–14.5)
HCT VFR BLD AUTO: 43.5 % (ref 36–46)
HGB BLD-MCNC: 15 G/DL (ref 12–16)
IMM GRANULOCYTES # BLD AUTO: 0.06 X10*3/UL (ref 0–0.7)
IMM GRANULOCYTES NFR BLD AUTO: 0.5 % (ref 0–0.9)
LYMPHOCYTES # BLD AUTO: 2.66 X10*3/UL (ref 1.2–4.8)
LYMPHOCYTES NFR BLD AUTO: 21 %
MCH RBC QN AUTO: 29.9 PG (ref 26–34)
MCHC RBC AUTO-ENTMCNC: 34.5 G/DL (ref 32–36)
MCV RBC AUTO: 87 FL (ref 80–100)
MONOCYTES # BLD AUTO: 0.79 X10*3/UL (ref 0.1–1)
MONOCYTES NFR BLD AUTO: 6.2 %
NEUTROPHILS # BLD AUTO: 8.86 X10*3/UL (ref 1.2–7.7)
NEUTROPHILS NFR BLD AUTO: 70.1 %
NRBC BLD-RTO: 0 /100 WBCS (ref 0–0)
PLATELET # BLD AUTO: 256 X10*3/UL (ref 150–450)
RBC # BLD AUTO: 5.01 X10*6/UL (ref 4–5.2)
WBC # BLD AUTO: 12.7 X10*3/UL (ref 4.4–11.3)

## 2025-05-27 PROCEDURE — 36415 COLL VENOUS BLD VENIPUNCTURE: CPT | Performed by: STUDENT IN AN ORGANIZED HEALTH CARE EDUCATION/TRAINING PROGRAM

## 2025-05-27 PROCEDURE — 72170 X-RAY EXAM OF PELVIS: CPT

## 2025-05-27 PROCEDURE — G0390 TRAUMA RESPONS W/HOSP CRITI: HCPCS

## 2025-05-27 PROCEDURE — 71045 X-RAY EXAM CHEST 1 VIEW: CPT

## 2025-05-27 PROCEDURE — 84484 ASSAY OF TROPONIN QUANT: CPT | Performed by: STUDENT IN AN ORGANIZED HEALTH CARE EDUCATION/TRAINING PROGRAM

## 2025-05-27 PROCEDURE — 72170 X-RAY EXAM OF PELVIS: CPT | Performed by: RADIOLOGY

## 2025-05-27 PROCEDURE — 72125 CT NECK SPINE W/O DYE: CPT

## 2025-05-27 PROCEDURE — 83605 ASSAY OF LACTIC ACID: CPT | Performed by: STUDENT IN AN ORGANIZED HEALTH CARE EDUCATION/TRAINING PROGRAM

## 2025-05-27 PROCEDURE — 80053 COMPREHEN METABOLIC PANEL: CPT | Performed by: STUDENT IN AN ORGANIZED HEALTH CARE EDUCATION/TRAINING PROGRAM

## 2025-05-27 PROCEDURE — 84702 CHORIONIC GONADOTROPIN TEST: CPT | Performed by: STUDENT IN AN ORGANIZED HEALTH CARE EDUCATION/TRAINING PROGRAM

## 2025-05-27 PROCEDURE — 71045 X-RAY EXAM CHEST 1 VIEW: CPT | Performed by: RADIOLOGY

## 2025-05-27 PROCEDURE — 70450 CT HEAD/BRAIN W/O DYE: CPT

## 2025-05-27 PROCEDURE — 74177 CT ABD & PELVIS W/CONTRAST: CPT

## 2025-05-27 PROCEDURE — 99291 CRITICAL CARE FIRST HOUR: CPT | Performed by: STUDENT IN AN ORGANIZED HEALTH CARE EDUCATION/TRAINING PROGRAM

## 2025-05-27 PROCEDURE — 93005 ELECTROCARDIOGRAM TRACING: CPT

## 2025-05-27 PROCEDURE — 84439 ASSAY OF FREE THYROXINE: CPT | Performed by: STUDENT IN AN ORGANIZED HEALTH CARE EDUCATION/TRAINING PROGRAM

## 2025-05-27 PROCEDURE — 86900 BLOOD TYPING SEROLOGIC ABO: CPT | Performed by: STUDENT IN AN ORGANIZED HEALTH CARE EDUCATION/TRAINING PROGRAM

## 2025-05-27 PROCEDURE — 84443 ASSAY THYROID STIM HORMONE: CPT | Performed by: STUDENT IN AN ORGANIZED HEALTH CARE EDUCATION/TRAINING PROGRAM

## 2025-05-27 PROCEDURE — 86901 BLOOD TYPING SEROLOGIC RH(D): CPT | Performed by: STUDENT IN AN ORGANIZED HEALTH CARE EDUCATION/TRAINING PROGRAM

## 2025-05-27 PROCEDURE — 71260 CT THORAX DX C+: CPT

## 2025-05-27 PROCEDURE — 80320 DRUG SCREEN QUANTALCOHOLS: CPT | Performed by: STUDENT IN AN ORGANIZED HEALTH CARE EDUCATION/TRAINING PROGRAM

## 2025-05-27 PROCEDURE — 85610 PROTHROMBIN TIME: CPT | Performed by: STUDENT IN AN ORGANIZED HEALTH CARE EDUCATION/TRAINING PROGRAM

## 2025-05-27 PROCEDURE — 85025 COMPLETE CBC W/AUTO DIFF WBC: CPT | Performed by: STUDENT IN AN ORGANIZED HEALTH CARE EDUCATION/TRAINING PROGRAM

## 2025-05-27 SDOH — HEALTH STABILITY: MENTAL HEALTH: HAVE YOU BEEN THINKING ABOUT HOW YOU MIGHT DO THIS?: YES

## 2025-05-27 SDOH — HEALTH STABILITY: MENTAL HEALTH: BEHAVIORAL HEALTH(WDL): EXCEPTIONS TO WDL

## 2025-05-27 SDOH — HEALTH STABILITY: MENTAL HEALTH: RISK OF SUICIDE: HIGH RISK

## 2025-05-27 SDOH — HEALTH STABILITY: MENTAL HEALTH: NEEDS EXPRESSED: EMOTIONAL

## 2025-05-27 SDOH — HEALTH STABILITY: MENTAL HEALTH: DELUSIONS: OTHER (COMMENT)

## 2025-05-27 SDOH — SOCIAL STABILITY: SOCIAL NETWORK: EMOTIONAL SUPPORT GIVEN: REASSURE

## 2025-05-27 SDOH — HEALTH STABILITY: MENTAL HEALTH
OTHER SUICIDE PRECAUTIONS INCLUDE: PATIENT PLACED IN AN EASILY OBSERVABLE ROOM WITH DOOR/CURTAIN REMAINING OPEN;PATIENT PLACED IN GOWN (SNAPS OR PAPER GOWNS PREFERRED) AND WANDED;REMAINING RISKS IDENTIFIED AND MITIGATED;PATIENT PLACED IN PSYCH SAFE ROOM (IF AVAILABLE);PROVIDER NOTIFIED;ELOPEMENT RISK IDENTIFIED;FAMILY/VISITOR ADVISED TO MAINTAIN CONTROL OF OWN PERSONAL BELONGINGS IN ROOM;HOME MEDICATION LIST COLLECTED AND SHARED WITH PROVIDER;HOURLY BEHAVIORAL ASSESSMENT PERFORMED;PERSONAL BELONGINGS SECURED;TREATMENT PLAN BASED ON RISK FACTORS DEVELOPED (ED ONLY - IF PATIENT IN ED MORE THAN 8 HOURS);VISITORS LIMITED WHEN NECESSARY AND PERSONAL ITEMS SCREENED

## 2025-05-27 SDOH — HEALTH STABILITY: MENTAL HEALTH
HAVE YOU STARTED TO WORK OUT OR WORKED OUT THE DETAILS OF HOW TO KILL YOURSELF? DO YOU INTENT TO CARRY OUT THIS PLAN?: YES

## 2025-05-27 SDOH — HEALTH STABILITY: MENTAL HEALTH: HAVE YOU ACTUALLY HAD ANY THOUGHTS OF KILLING YOURSELF?: YES

## 2025-05-27 SDOH — HEALTH STABILITY: MENTAL HEALTH

## 2025-05-27 SDOH — SOCIAL STABILITY: SOCIAL NETWORK: VISITOR BEHAVIORS: UNABLE TO ASSESS

## 2025-05-27 SDOH — HEALTH STABILITY: MENTAL HEALTH: HAVE YOU HAD THESE THOUGHTS AND HAD SOME INTENTION OF ACTING ON THEM?: YES

## 2025-05-27 SDOH — HEALTH STABILITY: MENTAL HEALTH: SUICIDE ASSESSMENT: ADULT (C-SSRS)

## 2025-05-27 SDOH — HEALTH STABILITY: MENTAL HEALTH: WAS THIS WITHIN THE PAST THREE MONTHS?: YES

## 2025-05-27 SDOH — HEALTH STABILITY: MENTAL HEALTH: HAVE YOU EVER DONE ANYTHING, STARTED TO DO ANYTHING, OR PREPARED TO DO ANYTHING TO END YOUR LIFE?: YES

## 2025-05-27 SDOH — HEALTH STABILITY: MENTAL HEALTH: BEHAVIORS/MOOD: AGITATED;ANXIOUS;TEARFUL;COOPERATIVE

## 2025-05-27 SDOH — HEALTH STABILITY: MENTAL HEALTH: HAVE YOU WISHED YOU WERE DEAD OR WISHED YOU COULD GO TO SLEEP AND NOT WAKE UP?: YES

## 2025-05-27 SDOH — HEALTH STABILITY: MENTAL HEALTH: CONTENT: BLAMING OTHERS;BLAMING SELF

## 2025-05-27 ASSESSMENT — PAIN DESCRIPTION - LOCATION: LOCATION: RIB CAGE

## 2025-05-27 ASSESSMENT — PAIN DESCRIPTION - PAIN TYPE: TYPE: ACUTE PAIN

## 2025-05-27 ASSESSMENT — COLUMBIA-SUICIDE SEVERITY RATING SCALE - C-SSRS
6. HAVE YOU EVER DONE ANYTHING, STARTED TO DO ANYTHING, OR PREPARED TO DO ANYTHING TO END YOUR LIFE?: YES
1. IN THE PAST MONTH, HAVE YOU WISHED YOU WERE DEAD OR WISHED YOU COULD GO TO SLEEP AND NOT WAKE UP?: YES
2. HAVE YOU ACTUALLY HAD ANY THOUGHTS OF KILLING YOURSELF?: YES
4. HAVE YOU HAD THESE THOUGHTS AND HAD SOME INTENTION OF ACTING ON THEM?: YES
6. HAVE YOU EVER DONE ANYTHING, STARTED TO DO ANYTHING, OR PREPARED TO DO ANYTHING TO END YOUR LIFE?: YES
5. HAVE YOU STARTED TO WORK OUT OR WORKED OUT THE DETAILS OF HOW TO KILL YOURSELF? DO YOU INTEND TO CARRY OUT THIS PLAN?: YES

## 2025-05-27 ASSESSMENT — PAIN SCALES - GENERAL: PAINLEVEL_OUTOF10: 6

## 2025-05-27 ASSESSMENT — PAIN - FUNCTIONAL ASSESSMENT: PAIN_FUNCTIONAL_ASSESSMENT: 0-10

## 2025-05-28 ENCOUNTER — HOSPITAL ENCOUNTER (INPATIENT)
Facility: HOSPITAL | Age: 45
End: 2025-05-28
Attending: PSYCHIATRY & NEUROLOGY | Admitting: PSYCHIATRY & NEUROLOGY
Payer: MEDICARE

## 2025-05-28 ENCOUNTER — APPOINTMENT (OUTPATIENT)
Dept: CARDIOLOGY | Facility: HOSPITAL | Age: 45
End: 2025-05-28
Payer: MEDICARE

## 2025-05-28 ENCOUNTER — APPOINTMENT (OUTPATIENT)
Dept: RADIOLOGY | Facility: HOSPITAL | Age: 45
End: 2025-05-28
Payer: MEDICARE

## 2025-05-28 VITALS
DIASTOLIC BLOOD PRESSURE: 83 MMHG | SYSTOLIC BLOOD PRESSURE: 118 MMHG | TEMPERATURE: 98.2 F | WEIGHT: 200 LBS | HEIGHT: 68 IN | HEART RATE: 76 BPM | BODY MASS INDEX: 30.31 KG/M2 | RESPIRATION RATE: 18 BRPM | OXYGEN SATURATION: 95 %

## 2025-05-28 DIAGNOSIS — F31.9 BIPOLAR DEPRESSION (MULTI): Primary | ICD-10-CM

## 2025-05-28 DIAGNOSIS — R10.13 DYSPEPSIA: ICD-10-CM

## 2025-05-28 LAB
ABO GROUP (TYPE) IN BLOOD: NORMAL
ALBUMIN SERPL BCP-MCNC: 4.7 G/DL (ref 3.4–5)
ALP SERPL-CCNC: 44 U/L (ref 33–110)
ALT SERPL W P-5'-P-CCNC: 10 U/L (ref 7–45)
AMPHETAMINES UR QL SCN: ABNORMAL
ANION GAP SERPL CALC-SCNC: 18 MMOL/L (ref 10–20)
ANTIBODY SCREEN: NORMAL
APAP SERPL-MCNC: <10 UG/ML (ref ?–30)
APPEARANCE UR: CLEAR
AST SERPL W P-5'-P-CCNC: 15 U/L (ref 9–39)
ATRIAL RATE: 77 BPM
B-HCG SERPL-ACNC: <2 MIU/ML
BARBITURATES UR QL SCN: ABNORMAL
BENZODIAZ UR QL SCN: ABNORMAL
BILIRUB SERPL-MCNC: 0.3 MG/DL (ref 0–1.2)
BILIRUB UR STRIP.AUTO-MCNC: NEGATIVE MG/DL
BUN SERPL-MCNC: 12 MG/DL (ref 6–23)
BZE UR QL SCN: ABNORMAL
CALCIUM SERPL-MCNC: 9.4 MG/DL (ref 8.6–10.3)
CANNABINOIDS UR QL SCN: ABNORMAL
CARDIAC TROPONIN I PNL SERPL HS: 3 NG/L (ref 0–13)
CARDIAC TROPONIN I PNL SERPL HS: 3 NG/L (ref 0–13)
CHLORIDE SERPL-SCNC: 103 MMOL/L (ref 98–107)
CO2 SERPL-SCNC: 19 MMOL/L (ref 21–32)
COLOR UR: COLORLESS
CREAT SERPL-MCNC: 0.77 MG/DL (ref 0.5–1.05)
EGFRCR SERPLBLD CKD-EPI 2021: >90 ML/MIN/1.73M*2
ETHANOL SERPL-MCNC: 249 MG/DL
FENTANYL+NORFENTANYL UR QL SCN: ABNORMAL
GLUCOSE SERPL-MCNC: 116 MG/DL (ref 74–99)
GLUCOSE UR STRIP.AUTO-MCNC: NORMAL MG/DL
HOLD SPECIMEN: 293
INR PPP: 1 (ref 0.9–1.1)
KETONES UR STRIP.AUTO-MCNC: NEGATIVE MG/DL
LACTATE SERPL-SCNC: 1.1 MMOL/L (ref 0.4–2)
LACTATE SERPL-SCNC: 2.8 MMOL/L (ref 0.4–2)
LACTATE SERPL-SCNC: 3.8 MMOL/L (ref 0.4–2)
LEUKOCYTE ESTERASE UR QL STRIP.AUTO: NEGATIVE
METHADONE UR QL SCN: ABNORMAL
NITRITE UR QL STRIP.AUTO: NEGATIVE
OPIATES UR QL SCN: ABNORMAL
OXYCODONE+OXYMORPHONE UR QL SCN: ABNORMAL
P AXIS: 43 DEGREES
P OFFSET: 197 MS
P ONSET: 154 MS
PCP UR QL SCN: ABNORMAL
PH UR STRIP.AUTO: 6 [PH]
POTASSIUM SERPL-SCNC: 4.1 MMOL/L (ref 3.5–5.3)
PR INTERVAL: 130 MS
PROT SERPL-MCNC: 7.3 G/DL (ref 6.4–8.2)
PROT UR STRIP.AUTO-MCNC: NEGATIVE MG/DL
PROTHROMBIN TIME: 10.8 SECONDS (ref 9.8–12.4)
Q ONSET: 219 MS
QRS COUNT: 13 BEATS
QRS DURATION: 84 MS
QT INTERVAL: 410 MS
QTC CALCULATION(BAZETT): 463 MS
QTC FREDERICIA: 445 MS
R AXIS: 58 DEGREES
RBC # UR STRIP.AUTO: NEGATIVE MG/DL
RH FACTOR (ANTIGEN D): NORMAL
SALICYLATES SERPL-MCNC: <3 MG/DL (ref ?–20)
SODIUM SERPL-SCNC: 136 MMOL/L (ref 136–145)
SP GR UR STRIP.AUTO: 1
T AXIS: 44 DEGREES
T OFFSET: 424 MS
T4 FREE SERPL-MCNC: 1 NG/DL (ref 0.61–1.12)
TSH SERPL-ACNC: 0.33 MIU/L (ref 0.44–3.98)
UROBILINOGEN UR STRIP.AUTO-MCNC: NORMAL MG/DL
VENTRICULAR RATE: 77 BPM

## 2025-05-28 PROCEDURE — 96374 THER/PROPH/DIAG INJ IV PUSH: CPT

## 2025-05-28 PROCEDURE — 71260 CT THORAX DX C+: CPT | Performed by: RADIOLOGY

## 2025-05-28 PROCEDURE — 36415 COLL VENOUS BLD VENIPUNCTURE: CPT | Performed by: STUDENT IN AN ORGANIZED HEALTH CARE EDUCATION/TRAINING PROGRAM

## 2025-05-28 PROCEDURE — 74177 CT ABD & PELVIS W/CONTRAST: CPT | Performed by: RADIOLOGY

## 2025-05-28 PROCEDURE — 70450 CT HEAD/BRAIN W/O DYE: CPT | Performed by: RADIOLOGY

## 2025-05-28 PROCEDURE — 72125 CT NECK SPINE W/O DYE: CPT | Performed by: RADIOLOGY

## 2025-05-28 PROCEDURE — 84484 ASSAY OF TROPONIN QUANT: CPT | Performed by: STUDENT IN AN ORGANIZED HEALTH CARE EDUCATION/TRAINING PROGRAM

## 2025-05-28 PROCEDURE — 80307 DRUG TEST PRSMV CHEM ANLYZR: CPT | Performed by: STUDENT IN AN ORGANIZED HEALTH CARE EDUCATION/TRAINING PROGRAM

## 2025-05-28 PROCEDURE — 96375 TX/PRO/DX INJ NEW DRUG ADDON: CPT

## 2025-05-28 PROCEDURE — 94640 AIRWAY INHALATION TREATMENT: CPT

## 2025-05-28 PROCEDURE — 2500000004 HC RX 250 GENERAL PHARMACY W/ HCPCS (ALT 636 FOR OP/ED): Performed by: STUDENT IN AN ORGANIZED HEALTH CARE EDUCATION/TRAINING PROGRAM

## 2025-05-28 PROCEDURE — 2500000001 HC RX 250 WO HCPCS SELF ADMINISTERED DRUGS (ALT 637 FOR MEDICARE OP): Performed by: REGISTERED NURSE

## 2025-05-28 PROCEDURE — 72131 CT LUMBAR SPINE W/O DYE: CPT | Performed by: RADIOLOGY

## 2025-05-28 PROCEDURE — 96361 HYDRATE IV INFUSION ADD-ON: CPT

## 2025-05-28 PROCEDURE — 81003 URINALYSIS AUTO W/O SCOPE: CPT | Performed by: STUDENT IN AN ORGANIZED HEALTH CARE EDUCATION/TRAINING PROGRAM

## 2025-05-28 PROCEDURE — 83605 ASSAY OF LACTIC ACID: CPT | Performed by: STUDENT IN AN ORGANIZED HEALTH CARE EDUCATION/TRAINING PROGRAM

## 2025-05-28 PROCEDURE — 72128 CT CHEST SPINE W/O DYE: CPT | Performed by: RADIOLOGY

## 2025-05-28 PROCEDURE — 2550000001 HC RX 255 CONTRASTS: Performed by: STUDENT IN AN ORGANIZED HEALTH CARE EDUCATION/TRAINING PROGRAM

## 2025-05-28 PROCEDURE — 73130 X-RAY EXAM OF HAND: CPT | Mod: RIGHT SIDE | Performed by: RADIOLOGY

## 2025-05-28 PROCEDURE — 84439 ASSAY OF FREE THYROXINE: CPT | Performed by: HOSPITALIST

## 2025-05-28 PROCEDURE — 99222 1ST HOSP IP/OBS MODERATE 55: CPT | Performed by: REGISTERED NURSE

## 2025-05-28 PROCEDURE — 1240000001 HC SEMI-PRIVATE BH ROOM DAILY

## 2025-05-28 PROCEDURE — 93005 ELECTROCARDIOGRAM TRACING: CPT

## 2025-05-28 PROCEDURE — 2500000002 HC RX 250 W HCPCS SELF ADMINISTERED DRUGS (ALT 637 FOR MEDICARE OP, ALT 636 FOR OP/ED): Mod: JZ | Performed by: STUDENT IN AN ORGANIZED HEALTH CARE EDUCATION/TRAINING PROGRAM

## 2025-05-28 PROCEDURE — 2500000004 HC RX 250 GENERAL PHARMACY W/ HCPCS (ALT 636 FOR OP/ED): Mod: JZ | Performed by: STUDENT IN AN ORGANIZED HEALTH CARE EDUCATION/TRAINING PROGRAM

## 2025-05-28 PROCEDURE — 99222 1ST HOSP IP/OBS MODERATE 55: CPT | Performed by: NURSE PRACTITIONER

## 2025-05-28 PROCEDURE — 73130 X-RAY EXAM OF HAND: CPT | Mod: RT

## 2025-05-28 RX ORDER — ACETAMINOPHEN 325 MG/1
650 TABLET ORAL EVERY 6 HOURS PRN
Status: DISPENSED | OUTPATIENT
Start: 2025-05-28

## 2025-05-28 RX ORDER — LURASIDONE HYDROCHLORIDE 40 MG/1
20 TABLET, FILM COATED ORAL
Status: DISCONTINUED | OUTPATIENT
Start: 2025-05-28 | End: 2025-06-01

## 2025-05-28 RX ORDER — OLANZAPINE 5 MG/1
5 TABLET, FILM COATED ORAL EVERY 6 HOURS PRN
Status: ACTIVE | OUTPATIENT
Start: 2025-05-28

## 2025-05-28 RX ORDER — HYDROXYZINE HYDROCHLORIDE 25 MG/1
50 TABLET, FILM COATED ORAL EVERY 6 HOURS PRN
Status: DISPENSED | OUTPATIENT
Start: 2025-05-28

## 2025-05-28 RX ORDER — OMEPRAZOLE 40 MG/1
40 CAPSULE, DELAYED RELEASE ORAL
Status: ON HOLD | COMMUNITY
Start: 2025-02-06

## 2025-05-28 RX ORDER — PRAZOSIN HYDROCHLORIDE 2 MG/1
1 CAPSULE ORAL
Status: ON HOLD | COMMUNITY
Start: 2025-05-23

## 2025-05-28 RX ORDER — TRAZODONE HYDROCHLORIDE 50 MG/1
50 TABLET ORAL NIGHTLY PRN
Status: DISCONTINUED | OUTPATIENT
Start: 2025-05-28 | End: 2025-05-29

## 2025-05-28 RX ORDER — IPRATROPIUM BROMIDE AND ALBUTEROL SULFATE 2.5; .5 MG/3ML; MG/3ML
3 SOLUTION RESPIRATORY (INHALATION) ONCE
Status: COMPLETED | OUTPATIENT
Start: 2025-05-28 | End: 2025-05-28

## 2025-05-28 RX ORDER — DIVALPROEX SODIUM 500 MG/1
1 TABLET, DELAYED RELEASE ORAL
Status: ON HOLD | COMMUNITY
Start: 2025-05-23

## 2025-05-28 RX ORDER — MORPHINE SULFATE 4 MG/ML
4 INJECTION, SOLUTION INTRAMUSCULAR; INTRAVENOUS ONCE
Status: COMPLETED | OUTPATIENT
Start: 2025-05-28 | End: 2025-05-28

## 2025-05-28 RX ORDER — ALUMINUM HYDROXIDE, MAGNESIUM HYDROXIDE, AND SIMETHICONE 1200; 120; 1200 MG/30ML; MG/30ML; MG/30ML
10 SUSPENSION ORAL 4 TIMES DAILY PRN
Status: ACTIVE | OUTPATIENT
Start: 2025-05-28

## 2025-05-28 RX ORDER — METHIMAZOLE 10 MG/1
2 TABLET ORAL
Status: ON HOLD | COMMUNITY
Start: 2024-11-10

## 2025-05-28 RX ORDER — PRAZOSIN HYDROCHLORIDE 1 MG/1
2 CAPSULE ORAL
Status: DISCONTINUED | OUTPATIENT
Start: 2025-05-28 | End: 2025-06-01

## 2025-05-28 RX ORDER — PREDNISONE 20 MG/1
40 TABLET ORAL ONCE
Status: COMPLETED | OUTPATIENT
Start: 2025-05-28 | End: 2025-05-28

## 2025-05-28 RX ORDER — OLANZAPINE 5 MG/1
1 TABLET, FILM COATED ORAL
Status: ON HOLD | COMMUNITY
Start: 2025-05-23 | End: 2025-05-28 | Stop reason: ALTCHOICE

## 2025-05-28 RX ORDER — DIVALPROEX SODIUM 250 MG/1
500 TABLET, DELAYED RELEASE ORAL
Status: DISPENSED | OUTPATIENT
Start: 2025-05-28

## 2025-05-28 RX ORDER — ONDANSETRON HYDROCHLORIDE 2 MG/ML
4 INJECTION, SOLUTION INTRAVENOUS ONCE
Status: COMPLETED | OUTPATIENT
Start: 2025-05-28 | End: 2025-05-28

## 2025-05-28 RX ORDER — PANTOPRAZOLE SODIUM 40 MG/1
40 TABLET, DELAYED RELEASE ORAL
Status: DISPENSED | OUTPATIENT
Start: 2025-05-29

## 2025-05-28 RX ORDER — OLANZAPINE 10 MG/2ML
5 INJECTION, POWDER, FOR SOLUTION INTRAMUSCULAR EVERY 6 HOURS PRN
Status: ACTIVE | OUTPATIENT
Start: 2025-05-28

## 2025-05-28 RX ORDER — ESZOPICLONE 1 MG/1
1 TABLET, FILM COATED ORAL NIGHTLY
Status: ON HOLD | COMMUNITY
End: 2025-05-28 | Stop reason: ALTCHOICE

## 2025-05-28 RX ADMIN — SODIUM CHLORIDE, POTASSIUM CHLORIDE, SODIUM LACTATE AND CALCIUM CHLORIDE 1000 ML: 600; 310; 30; 20 INJECTION, SOLUTION INTRAVENOUS at 04:23

## 2025-05-28 RX ADMIN — ONDANSETRON 4 MG: 2 INJECTION INTRAMUSCULAR; INTRAVENOUS at 04:34

## 2025-05-28 RX ADMIN — IOHEXOL 100 ML: 350 INJECTION, SOLUTION INTRAVENOUS at 00:01

## 2025-05-28 RX ADMIN — DIVALPROEX SODIUM 500 MG: 250 TABLET, DELAYED RELEASE ORAL at 18:00

## 2025-05-28 RX ADMIN — IPRATROPIUM BROMIDE AND ALBUTEROL SULFATE 3 ML: .5; 3 SOLUTION RESPIRATORY (INHALATION) at 15:56

## 2025-05-28 RX ADMIN — LURASIDONE HYDROCHLORIDE 20 MG: 40 TABLET, FILM COATED ORAL at 18:00

## 2025-05-28 RX ADMIN — MORPHINE SULFATE 4 MG: 4 INJECTION, SOLUTION INTRAMUSCULAR; INTRAVENOUS at 04:34

## 2025-05-28 RX ADMIN — PRAZOSIN HYDROCHLORIDE 2 MG: 1 CAPSULE ORAL at 18:00

## 2025-05-28 RX ADMIN — PREDNISONE 40 MG: 20 TABLET ORAL at 15:37

## 2025-05-28 SDOH — SOCIAL STABILITY: SOCIAL NETWORK: PARENT/GUARDIAN/SIGNIFICANT OTHER INVOLVEMENT: NO INVOLVEMENT

## 2025-05-28 SDOH — ECONOMIC STABILITY: INCOME INSECURITY: IN THE PAST 12 MONTHS HAS THE ELECTRIC, GAS, OIL, OR WATER COMPANY THREATENED TO SHUT OFF SERVICES IN YOUR HOME?: NO

## 2025-05-28 SDOH — HEALTH STABILITY: MENTAL HEALTH: CONTENT: UNREMARKABLE

## 2025-05-28 SDOH — ECONOMIC STABILITY: FOOD INSECURITY
WITHIN THE PAST 12 MONTHS, YOU WORRIED THAT YOUR FOOD WOULD RUN OUT BEFORE YOU GOT THE MONEY TO BUY MORE.: SOMETIMES TRUE

## 2025-05-28 SDOH — HEALTH STABILITY: MENTAL HEALTH: BEHAVIORS/MOOD: CALM;COOPERATIVE

## 2025-05-28 SDOH — ECONOMIC STABILITY: FOOD INSECURITY: WITHIN THE PAST 12 MONTHS, THE FOOD YOU BOUGHT JUST DIDN'T LAST AND YOU DIDN'T HAVE MONEY TO GET MORE.: SOMETIMES TRUE

## 2025-05-28 SDOH — HEALTH STABILITY: MENTAL HEALTH: DELUSIONS: CONTROLLED

## 2025-05-28 SDOH — HEALTH STABILITY: MENTAL HEALTH: BEHAVIORS/MOOD: AGITATED;ANXIOUS;TEARFUL;COOPERATIVE

## 2025-05-28 SDOH — SOCIAL STABILITY: SOCIAL NETWORK: VISITOR BEHAVIORS: UNABLE TO ASSESS

## 2025-05-28 SDOH — SOCIAL STABILITY: SOCIAL INSECURITY: FAMILY BEHAVIORS: UNABLE TO ASSESS

## 2025-05-28 SDOH — HEALTH STABILITY: MENTAL HEALTH
OTHER SUICIDE PRECAUTIONS INCLUDE: PATIENT PLACED IN AN EASILY OBSERVABLE ROOM WITH DOOR/CURTAIN REMAINING OPEN;PATIENT PLACED IN GOWN (SNAPS OR PAPER GOWNS PREFERRED) AND WANDED;REMAINING RISKS IDENTIFIED AND MITIGATED;PATIENT PLACED IN PSYCH SAFE ROOM (IF AVAILABLE)

## 2025-05-28 SDOH — HEALTH STABILITY: PHYSICAL HEALTH: ON AVERAGE, HOW MANY MINUTES DO YOU ENGAGE IN EXERCISE AT THIS LEVEL?: 30 MIN

## 2025-05-28 SDOH — HEALTH STABILITY: MENTAL HEALTH: SUICIDE ASSESSMENT: ADULT (C-SSRS)

## 2025-05-28 SDOH — HEALTH STABILITY: MENTAL HEALTH: BEHAVIORAL HEALTH(WDL): EXCEPTIONS TO WDL

## 2025-05-28 SDOH — HEALTH STABILITY: MENTAL HEALTH: NEEDS EXPRESSED: DENIES

## 2025-05-28 SDOH — HEALTH STABILITY: MENTAL HEALTH: BEHAVIORS/MOOD: APPEARS INTOXICATED;ANXIOUS;IRRITABLE

## 2025-05-28 SDOH — HEALTH STABILITY: MENTAL HEALTH
DO YOU FEEL STRESS - TENSE, RESTLESS, NERVOUS, OR ANXIOUS, OR UNABLE TO SLEEP AT NIGHT BECAUSE YOUR MIND IS TROUBLED ALL THE TIME - THESE DAYS?: RATHER MUCH

## 2025-05-28 SDOH — HEALTH STABILITY: MENTAL HEALTH

## 2025-05-28 SDOH — ECONOMIC STABILITY: HOUSING INSECURITY: AT ANY TIME IN THE PAST 12 MONTHS, WERE YOU HOMELESS OR LIVING IN A SHELTER (INCLUDING NOW)?: NO

## 2025-05-28 SDOH — HEALTH STABILITY: MENTAL HEALTH: HAVE YOU WISHED YOU WERE DEAD OR WISHED YOU COULD GO TO SLEEP AND NOT WAKE UP?: YES

## 2025-05-28 SDOH — ECONOMIC STABILITY: HOUSING INSECURITY: IN THE LAST 12 MONTHS, WAS THERE A TIME WHEN YOU WERE NOT ABLE TO PAY THE MORTGAGE OR RENT ON TIME?: NO

## 2025-05-28 SDOH — SOCIAL STABILITY: SOCIAL INSECURITY: DOES ANYONE TRY TO KEEP YOU FROM HAVING/CONTACTING OTHER FRIENDS OR DOING THINGS OUTSIDE YOUR HOME?: NO

## 2025-05-28 SDOH — HEALTH STABILITY: MENTAL HEALTH: WAS THIS WITHIN THE PAST THREE MONTHS?: YES

## 2025-05-28 SDOH — SOCIAL STABILITY: SOCIAL INSECURITY: ARE THERE ANY APPARENT SIGNS OF INJURIES/BEHAVIORS THAT COULD BE RELATED TO ABUSE/NEGLECT?: NO

## 2025-05-28 SDOH — HEALTH STABILITY: MENTAL HEALTH: BEHAVIORS/MOOD: CALM;COOPERATIVE;TEARFUL;VERBAL

## 2025-05-28 SDOH — HEALTH STABILITY: MENTAL HEALTH: NEEDS EXPRESSED: EMOTIONAL

## 2025-05-28 SDOH — HEALTH STABILITY: PHYSICAL HEALTH
HOW OFTEN DO YOU NEED TO HAVE SOMEONE HELP YOU WHEN YOU READ INSTRUCTIONS, PAMPHLETS, OR OTHER WRITTEN MATERIAL FROM YOUR DOCTOR OR PHARMACY?: SOMETIMES

## 2025-05-28 SDOH — HEALTH STABILITY: MENTAL HEALTH: SLEEP PATTERN: UNABLE TO ASSESS

## 2025-05-28 SDOH — HEALTH STABILITY: MENTAL HEALTH: BEHAVIORS/MOOD: COOPERATIVE

## 2025-05-28 SDOH — SOCIAL STABILITY: SOCIAL INSECURITY
WITHIN THE LAST YEAR, HAVE YOU BEEN RAPED OR FORCED TO HAVE ANY KIND OF SEXUAL ACTIVITY BY YOUR PARTNER OR EX-PARTNER?: NO

## 2025-05-28 SDOH — HEALTH STABILITY: MENTAL HEALTH
OTHER SUICIDE PRECAUTIONS INCLUDE: PERSONAL BELONGINGS SECURED;ELOPEMENT RISK IDENTIFIED;REMAINING RISKS IDENTIFIED AND MITIGATED;TREATMENT PLAN BASED ON RISK FACTORS DEVELOPED (ED ONLY - IF PATIENT IN ED MORE THAN 8 HOURS);VISITORS LIMITED WHEN NECESSARY AND PERSONAL ITEMS SCREENED

## 2025-05-28 SDOH — SOCIAL STABILITY: SOCIAL NETWORK: EMOTIONAL SUPPORT GIVEN: REASSURE

## 2025-05-28 SDOH — HEALTH STABILITY: MENTAL HEALTH: HAVE YOU BEEN THINKING ABOUT HOW YOU MIGHT DO THIS?: YES

## 2025-05-28 SDOH — SOCIAL STABILITY: SOCIAL INSECURITY: DO YOU FEEL UNSAFE GOING BACK TO THE PLACE WHERE YOU ARE LIVING?: YES

## 2025-05-28 SDOH — ECONOMIC STABILITY: FOOD INSECURITY: HOW HARD IS IT FOR YOU TO PAY FOR THE VERY BASICS LIKE FOOD, HOUSING, MEDICAL CARE, AND HEATING?: NOT VERY HARD

## 2025-05-28 SDOH — SOCIAL STABILITY: SOCIAL INSECURITY: WITHIN THE LAST YEAR, HAVE YOU BEEN AFRAID OF YOUR PARTNER OR EX-PARTNER?: YES

## 2025-05-28 SDOH — SOCIAL STABILITY: SOCIAL INSECURITY

## 2025-05-28 SDOH — HEALTH STABILITY: MENTAL HEALTH: HAVE YOU HAD THESE THOUGHTS AND HAD SOME INTENTION OF ACTING ON THEM?: YES

## 2025-05-28 SDOH — HEALTH STABILITY: MENTAL HEALTH: BEHAVIORS/MOOD: ANXIOUS;COOPERATIVE

## 2025-05-28 SDOH — SOCIAL STABILITY: SOCIAL INSECURITY
WITHIN THE LAST YEAR, HAVE YOU BEEN KICKED, HIT, SLAPPED, OR OTHERWISE PHYSICALLY HURT BY YOUR PARTNER OR EX-PARTNER?: YES

## 2025-05-28 SDOH — HEALTH STABILITY: PHYSICAL HEALTH: ON AVERAGE, HOW MANY DAYS PER WEEK DO YOU ENGAGE IN MODERATE TO STRENUOUS EXERCISE (LIKE A BRISK WALK)?: 3 DAYS

## 2025-05-28 SDOH — ECONOMIC STABILITY: HOUSING INSECURITY: IN THE PAST 12 MONTHS, HOW MANY TIMES HAVE YOU MOVED WHERE YOU WERE LIVING?: 0

## 2025-05-28 SDOH — SOCIAL STABILITY: SOCIAL NETWORK: IN A TYPICAL WEEK, HOW MANY TIMES DO YOU TALK ON THE PHONE WITH FAMILY, FRIENDS, OR NEIGHBORS?: THREE TIMES A WEEK

## 2025-05-28 SDOH — SOCIAL STABILITY: SOCIAL INSECURITY: ARE YOU MARRIED, WIDOWED, DIVORCED, SEPARATED, NEVER MARRIED, OR LIVING WITH A PARTNER?: NEVER MARRIED

## 2025-05-28 SDOH — HEALTH STABILITY: MENTAL HEALTH: NEEDS EXPRESSED: PHYSICAL

## 2025-05-28 SDOH — SOCIAL STABILITY: SOCIAL INSECURITY: DO YOU FEEL ANYONE HAS EXPLOITED OR TAKEN ADVANTAGE OF YOU FINANCIALLY OR OF YOUR PERSONAL PROPERTY?: NO

## 2025-05-28 SDOH — SOCIAL STABILITY: SOCIAL INSECURITY: WITHIN THE LAST YEAR, HAVE YOU BEEN HUMILIATED OR EMOTIONALLY ABUSED IN OTHER WAYS BY YOUR PARTNER OR EX-PARTNER?: YES

## 2025-05-28 SDOH — SOCIAL STABILITY: SOCIAL NETWORK
DO YOU BELONG TO ANY CLUBS OR ORGANIZATIONS SUCH AS CHURCH GROUPS, UNIONS, FRATERNAL OR ATHLETIC GROUPS, OR SCHOOL GROUPS?: NO

## 2025-05-28 SDOH — SOCIAL STABILITY: SOCIAL INSECURITY: ABUSE: ADULT

## 2025-05-28 SDOH — HEALTH STABILITY: MENTAL HEALTH: HAVE YOU EVER DONE ANYTHING, STARTED TO DO ANYTHING, OR PREPARED TO DO ANYTHING TO END YOUR LIFE?: YES

## 2025-05-28 SDOH — SOCIAL STABILITY: SOCIAL NETWORK: HOW OFTEN DO YOU ATTEND MEETINGS OF THE CLUBS OR ORGANIZATIONS YOU BELONG TO?: NEVER

## 2025-05-28 SDOH — SOCIAL STABILITY: SOCIAL NETWORK: HOW OFTEN DO YOU GET TOGETHER WITH FRIENDS OR RELATIVES?: THREE TIMES A WEEK

## 2025-05-28 SDOH — HEALTH STABILITY: MENTAL HEALTH: HAVE YOU ACTUALLY HAD ANY THOUGHTS OF KILLING YOURSELF?: YES

## 2025-05-28 SDOH — HEALTH STABILITY: MENTAL HEALTH: RISK OF SUICIDE: HIGH RISK

## 2025-05-28 SDOH — HEALTH STABILITY: MENTAL HEALTH: BEHAVIORS/MOOD: SAD;COOPERATIVE

## 2025-05-28 SDOH — SOCIAL STABILITY: SOCIAL INSECURITY: WERE YOU ABLE TO COMPLETE ALL THE BEHAVIORAL HEALTH SCREENINGS?: YES

## 2025-05-28 SDOH — SOCIAL STABILITY: SOCIAL NETWORK: HOW OFTEN DO YOU ATTEND CHURCH OR RELIGIOUS SERVICES?: NEVER

## 2025-05-28 SDOH — HEALTH STABILITY: MENTAL HEALTH
EXPERIENCED ANY OF THE FOLLOWING LIFE EVENTS: SEXUAL ASSAULT;CHILDHOOD NEGLECT;TRANSPORTATION ACCIDENT;SOCIAL LOSS (BANKRUPTCY, DIVORCE, WORK-RELATED STRESS)

## 2025-05-28 SDOH — HEALTH STABILITY: MENTAL HEALTH: OTHER SUICIDE PRECAUTIONS INCLUDE: PATIENT PLACED IN AN EASILY OBSERVABLE ROOM WITH DOOR/CURTAIN REMAINING OPEN

## 2025-05-28 SDOH — SOCIAL STABILITY: SOCIAL INSECURITY: ARE YOU OR HAVE YOU BEEN THREATENED OR ABUSED PHYSICALLY, EMOTIONALLY, OR SEXUALLY BY ANYONE?: NO

## 2025-05-28 SDOH — SOCIAL STABILITY: SOCIAL INSECURITY: HAS ANYONE EVER THREATENED TO HURT YOUR FAMILY OR YOUR PETS?: NO

## 2025-05-28 SDOH — HEALTH STABILITY: MENTAL HEALTH: BEHAVIORS/MOOD: AGITATED;APPEARS INTOXICATED;COOPERATIVE;SAD

## 2025-05-28 SDOH — SOCIAL STABILITY: SOCIAL NETWORK: PARENT/GUARDIAN/SIGNIFICANT OTHER INVOLVEMENT: ATTENTIVE TO PATIENT NEEDS

## 2025-05-28 SDOH — HEALTH STABILITY: MENTAL HEALTH: FOR HIGH RISK PATIENTS: ALL INTERVENTIONS ABOVE, PLUS:

## 2025-05-28 SDOH — ECONOMIC STABILITY: TRANSPORTATION INSECURITY: IN THE PAST 12 MONTHS, HAS LACK OF TRANSPORTATION KEPT YOU FROM MEDICAL APPOINTMENTS OR FROM GETTING MEDICATIONS?: YES

## 2025-05-28 SDOH — SOCIAL STABILITY: SOCIAL INSECURITY: HAVE YOU HAD THOUGHTS OF HARMING ANYONE ELSE?: NO

## 2025-05-28 SDOH — SOCIAL STABILITY: SOCIAL INSECURITY: HAVE YOU HAD ANY THOUGHTS OF HARMING ANYONE ELSE?: NO

## 2025-05-28 ASSESSMENT — LIFESTYLE VARIABLES
TREMOR: NO TREMOR
HAS A RELATIVE, FRIEND, DOCTOR, OR ANOTHER HEALTH PROFESSIONAL EXPRESSED CONCERN ABOUT YOUR DRINKING OR SUGGESTED YOU CUT DOWN: YES, BUT NOT IN THE LAST YEAR
PULSE: 60
PAROXYSMAL SWEATS: NO SWEAT VISIBLE
SKIP TO QUESTIONS 9-10: 0
SUBSTANCE_ABUSE_PAST_12_MONTHS: YES
HOW OFTEN DURING THE LAST YEAR HAVE YOU BEEN UNABLE TO REMEMBER WHAT HAPPENED THE NIGHT BEFORE BECAUSE YOU HAD BEEN DRINKING: LESS THAN MONTHLY
ANXIETY: MILDLY ANXIOUS
ORIENTATION AND CLOUDING OF SENSORIUM: ORIENTED AND CAN DO SERIAL ADDITIONS
HAVE YOU EVER FELT YOU SHOULD CUT DOWN ON YOUR DRINKING: NO
AGITATION: NORMAL ACTIVITY
HOW OFTEN DURING THE LAST YEAR HAVE YOU FAILED TO DO WHAT WAS NORMALLY EXPECTED FROM YOU BECAUSE OF DRINKING: NEVER
AUDIT TOTAL SCORE: 16
HOW OFTEN DO YOU HAVE A DRINK CONTAINING ALCOHOL: 2-4 TIMES A MONTH
TOTAL_SCORE: 1
HOW OFTEN DURING THE LAST YEAR HAVE YOU HAD A FEELING OF GUILT OR REMORSE AFTER DRINKING: MONTHLY
HAVE PEOPLE ANNOYED YOU BY CRITICIZING YOUR DRINKING: NO
VISUAL DISTURBANCES: NOT PRESENT
AUDITORY DISTURBANCES: NOT PRESENT
EVER HAD A DRINK FIRST THING IN THE MORNING TO STEADY YOUR NERVES TO GET RID OF A HANGOVER: NO
HOW OFTEN DO YOU HAVE 6 OR MORE DRINKS ON ONE OCCASION: WEEKLY
HOW OFTEN DURING THE LAST YEAR HAVE YOU FOUND THAT YOU WERE NOT ABLE TO STOP DRINKING ONCE YOU HAD STARTED: NEVER
PRESCIPTION_ABUSE_PAST_12_MONTHS: YES
EVER FELT BAD OR GUILTY ABOUT YOUR DRINKING: NO
HOW MANY STANDARD DRINKS CONTAINING ALCOHOL DO YOU HAVE ON A TYPICAL DAY: 5 OR 6
AUDIT-C TOTAL SCORE: 7
NAUSEA AND VOMITING: NO NAUSEA AND NO VOMITING
HAVE YOU OR SOMEONE ELSE BEEN INJURED AS A RESULT OF YOUR DRINKING: YES, DURING THE LAST YEAR
HEADACHE, FULLNESS IN HEAD: NOT PRESENT
AUDIT TOTAL SCORE: 9
CIWA TOTAL SCORE: 1
HOW OFTEN DURING THE LAST YEAR HAVE YOU NEEDED AN ALCOHOLIC DRINK FIRST THING IN THE MORNING TO GET YOURSELF GOING AFTER A NIGHT OF HEAVY DRINKING: NEVER
BLOOD PRESSURE: 126/76
AUDIT-C TOTAL SCORE: 7
TOTAL SCORE: 0

## 2025-05-28 ASSESSMENT — ACTIVITIES OF DAILY LIVING (ADL)
TOILETING: INDEPENDENT
DRESSING YOURSELF: INDEPENDENT
FEEDING YOURSELF: INDEPENDENT
BATHING: INDEPENDENT
ADEQUATE_TO_COMPLETE_ADL: YES
GROOMING: INDEPENDENT
HEARING - RIGHT EAR: FUNCTIONAL
HEARING - LEFT EAR: FUNCTIONAL
PATIENT'S MEMORY ADEQUATE TO SAFELY COMPLETE DAILY ACTIVITIES?: YES
JUDGMENT_ADEQUATE_SAFELY_COMPLETE_DAILY_ACTIVITIES: YES
LACK_OF_TRANSPORTATION: YES
WALKS IN HOME: INDEPENDENT

## 2025-05-28 ASSESSMENT — PAIN DESCRIPTION - DESCRIPTORS: DESCRIPTORS: ACHING;CRAMPING;BURNING

## 2025-05-28 ASSESSMENT — COLUMBIA-SUICIDE SEVERITY RATING SCALE - C-SSRS
6. HAVE YOU EVER DONE ANYTHING, STARTED TO DO ANYTHING, OR PREPARED TO DO ANYTHING TO END YOUR LIFE?: YES
1. IN THE PAST MONTH, HAVE YOU WISHED YOU WERE DEAD OR WISHED YOU COULD GO TO SLEEP AND NOT WAKE UP?: YES
6. HAVE YOU EVER DONE ANYTHING, STARTED TO DO ANYTHING, OR PREPARED TO DO ANYTHING TO END YOUR LIFE?: YES
4. HAVE YOU HAD THESE THOUGHTS AND HAD SOME INTENTION OF ACTING ON THEM?: YES
5. HAVE YOU STARTED TO WORK OUT OR WORKED OUT THE DETAILS OF HOW TO KILL YOURSELF? DO YOU INTEND TO CARRY OUT THIS PLAN?: NO
2. HAVE YOU ACTUALLY HAD ANY THOUGHTS OF KILLING YOURSELF?: YES

## 2025-05-28 ASSESSMENT — PAIN - FUNCTIONAL ASSESSMENT
PAIN_FUNCTIONAL_ASSESSMENT: 0-10

## 2025-05-28 ASSESSMENT — PATIENT HEALTH QUESTIONNAIRE - PHQ9
SUM OF ALL RESPONSES TO PHQ9 QUESTIONS 1 & 2: 4
1. LITTLE INTEREST OR PLEASURE IN DOING THINGS: MORE THAN HALF THE DAYS
2. FEELING DOWN, DEPRESSED OR HOPELESS: MORE THAN HALF THE DAYS

## 2025-05-28 ASSESSMENT — PAIN SCALES - GENERAL
PAINLEVEL_OUTOF10: 8
PAINLEVEL_OUTOF10: 0 - NO PAIN
PAINLEVEL_OUTOF10: 8
PAINLEVEL_OUTOF10: 8
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
## 2025-05-29 LAB
CHOLEST SERPL-MCNC: 207 MG/DL (ref 0–199)
CHOLESTEROL/HDL RATIO: 2.7
EST. AVERAGE GLUCOSE BLD GHB EST-MCNC: 100 MG/DL
HBA1C MFR BLD: 5.1 % (ref ?–5.7)
HDLC SERPL-MCNC: 75.8 MG/DL
LDLC SERPL CALC-MCNC: 103 MG/DL
NON HDL CHOLESTEROL: 131 MG/DL (ref 0–149)
T4 FREE SERPL-MCNC: 1.03 NG/DL (ref 0.61–1.12)
TRIGL SERPL-MCNC: 142 MG/DL (ref 0–149)
TSH SERPL-ACNC: 0.34 MIU/L (ref 0.44–3.98)
VALPROATE SERPL-MCNC: 48 UG/ML (ref 50–100)
VLDL: 28 MG/DL (ref 0–40)

## 2025-05-29 PROCEDURE — 2500000001 HC RX 250 WO HCPCS SELF ADMINISTERED DRUGS (ALT 637 FOR MEDICARE OP): Performed by: REGISTERED NURSE

## 2025-05-29 PROCEDURE — 2500000001 HC RX 250 WO HCPCS SELF ADMINISTERED DRUGS (ALT 637 FOR MEDICARE OP): Performed by: PSYCHIATRY & NEUROLOGY

## 2025-05-29 PROCEDURE — 2500000001 HC RX 250 WO HCPCS SELF ADMINISTERED DRUGS (ALT 637 FOR MEDICARE OP): Performed by: HOSPITALIST

## 2025-05-29 PROCEDURE — 83036 HEMOGLOBIN GLYCOSYLATED A1C: CPT | Mod: ELYLAB | Performed by: REGISTERED NURSE

## 2025-05-29 PROCEDURE — 36415 COLL VENOUS BLD VENIPUNCTURE: CPT | Performed by: REGISTERED NURSE

## 2025-05-29 PROCEDURE — 80061 LIPID PANEL: CPT | Performed by: REGISTERED NURSE

## 2025-05-29 PROCEDURE — 99232 SBSQ HOSP IP/OBS MODERATE 35: CPT | Performed by: PSYCHIATRY & NEUROLOGY

## 2025-05-29 PROCEDURE — 84443 ASSAY THYROID STIM HORMONE: CPT | Performed by: REGISTERED NURSE

## 2025-05-29 PROCEDURE — 99221 1ST HOSP IP/OBS SF/LOW 40: CPT | Performed by: HOSPITALIST

## 2025-05-29 PROCEDURE — 80164 ASSAY DIPROPYLACETIC ACD TOT: CPT | Performed by: REGISTERED NURSE

## 2025-05-29 PROCEDURE — 97167 OT EVAL HIGH COMPLEX 60 MIN: CPT | Mod: GO

## 2025-05-29 PROCEDURE — 97150 GROUP THERAPEUTIC PROCEDURES: CPT | Mod: GO

## 2025-05-29 PROCEDURE — 1240000001 HC SEMI-PRIVATE BH ROOM DAILY

## 2025-05-29 RX ORDER — TRAMADOL HYDROCHLORIDE 50 MG/1
50 TABLET, FILM COATED ORAL NIGHTLY
Status: DISCONTINUED | OUTPATIENT
Start: 2025-05-29 | End: 2025-05-31

## 2025-05-29 RX ORDER — TRAMADOL HYDROCHLORIDE 50 MG/1
50 TABLET, FILM COATED ORAL ONCE
Status: COMPLETED | OUTPATIENT
Start: 2025-05-29 | End: 2025-05-29

## 2025-05-29 RX ADMIN — PANTOPRAZOLE SODIUM 40 MG: 40 TABLET, DELAYED RELEASE ORAL at 08:09

## 2025-05-29 RX ADMIN — PRAZOSIN HYDROCHLORIDE 2 MG: 1 CAPSULE ORAL at 17:43

## 2025-05-29 RX ADMIN — TRAMADOL HYDROCHLORIDE 50 MG: 50 TABLET, FILM COATED ORAL at 08:49

## 2025-05-29 RX ADMIN — DIVALPROEX SODIUM 500 MG: 250 TABLET, DELAYED RELEASE ORAL at 17:43

## 2025-05-29 RX ADMIN — ACETAMINOPHEN 650 MG: 325 TABLET ORAL at 06:22

## 2025-05-29 RX ADMIN — LURASIDONE HYDROCHLORIDE 20 MG: 40 TABLET, FILM COATED ORAL at 17:43

## 2025-05-29 RX ADMIN — PRAZOSIN HYDROCHLORIDE 2 MG: 1 CAPSULE ORAL at 06:22

## 2025-05-29 RX ADMIN — DIVALPROEX SODIUM 500 MG: 250 TABLET, DELAYED RELEASE ORAL at 06:22

## 2025-05-29 RX ADMIN — TRAMADOL HYDROCHLORIDE 50 MG: 50 TABLET, FILM COATED ORAL at 21:23

## 2025-05-29 SDOH — HEALTH STABILITY: MENTAL HEALTH: WISH TO BE DEAD (PAST 1 MONTH): YES

## 2025-05-29 SDOH — HEALTH STABILITY: MENTAL HEALTH: SUICIDAL BEHAVIOR (DESCRIPTION): WRECKED CAR

## 2025-05-29 SDOH — HEALTH STABILITY: MENTAL HEALTH: SUICIDAL BEHAVIOR (LIFETIME): YES

## 2025-05-29 SDOH — HEALTH STABILITY: MENTAL HEALTH: HAVE YOU EVER TRIED TO KILL YOURSELF?: YES

## 2025-05-29 SDOH — HEALTH STABILITY: MENTAL HEALTH: ACTIVE SUICIDAL IDEATION WITH SOME INTENT TO ACT, WITHOUT SPECIFIC PLAN (PAST 1 MONTH): YES

## 2025-05-29 SDOH — HEALTH STABILITY: MENTAL HEALTH: IN THE PAST FEW WEEKS, HAVE YOU FELT THAT YOU OR YOUR FAMILY WOULD BE BETTER OFF IF YOU WERE DEAD?: NO

## 2025-05-29 SDOH — HEALTH STABILITY: MENTAL HEALTH: SUICIDAL BEHAVIOR (3 MONTHS): YES

## 2025-05-29 SDOH — HEALTH STABILITY: MENTAL HEALTH: ACTIVE SUICIDAL IDEATION WITH SPECIFIC PLAN AND INTENT (PAST 1 MONTH): NO

## 2025-05-29 SDOH — HEALTH STABILITY: MENTAL HEALTH: NON-SPECIFIC ACTIVE SUICIDAL THOUGHTS (PAST 1 MONTH): YES

## 2025-05-29 SDOH — ECONOMIC STABILITY: HOUSING INSECURITY: FEELS SAFE LIVING IN HOME: NO

## 2025-05-29 SDOH — HEALTH STABILITY: MENTAL HEALTH: ANXIETY SYMPTOMS: GENERALIZED

## 2025-05-29 SDOH — HEALTH STABILITY: MENTAL HEALTH: IN THE PAST WEEK, HAVE YOU BEEN HAVING THOUGHTS ABOUT KILLING YOURSELF?: YES

## 2025-05-29 SDOH — HEALTH STABILITY: MENTAL HEALTH: DEPRESSION SYMPTOMS: CHANGE IN ENERGY LEVEL

## 2025-05-29 SDOH — HEALTH STABILITY: MENTAL HEALTH

## 2025-05-29 SDOH — HEALTH STABILITY: MENTAL HEALTH: HOW DID YOU TRY TO KILL YOURSELF?: OVERDOSE, GUN, MVA

## 2025-05-29 SDOH — HEALTH STABILITY: MENTAL HEALTH: IN THE PAST FEW WEEKS, HAVE YOU WISHED YOU WERE DEAD?: YES

## 2025-05-29 ASSESSMENT — COLUMBIA-SUICIDE SEVERITY RATING SCALE - C-SSRS
5. HAVE YOU STARTED TO WORK OUT OR WORKED OUT THE DETAILS OF HOW TO KILL YOURSELF? DO YOU INTEND TO CARRY OUT THIS PLAN?: NO
2. HAVE YOU ACTUALLY HAD ANY THOUGHTS OF KILLING YOURSELF?: YES
1. IN THE PAST MONTH, HAVE YOU WISHED YOU WERE DEAD OR WISHED YOU COULD GO TO SLEEP AND NOT WAKE UP?: YES
5. HAVE YOU STARTED TO WORK OUT OR WORKED OUT THE DETAILS OF HOW TO KILL YOURSELF? DO YOU INTEND TO CARRY OUT THIS PLAN?: NO
1. IN THE PAST MONTH, HAVE YOU WISHED YOU WERE DEAD OR WISHED YOU COULD GO TO SLEEP AND NOT WAKE UP?: YES
4. HAVE YOU HAD THESE THOUGHTS AND HAD SOME INTENTION OF ACTING ON THEM?: YES
6. HAVE YOU EVER DONE ANYTHING, STARTED TO DO ANYTHING, OR PREPARED TO DO ANYTHING TO END YOUR LIFE?: YES
6. HAVE YOU EVER DONE ANYTHING, STARTED TO DO ANYTHING, OR PREPARED TO DO ANYTHING TO END YOUR LIFE?: YES
4. HAVE YOU HAD THESE THOUGHTS AND HAD SOME INTENTION OF ACTING ON THEM?: YES
2. HAVE YOU ACTUALLY HAD ANY THOUGHTS OF KILLING YOURSELF?: YES
6. HAVE YOU EVER DONE ANYTHING, STARTED TO DO ANYTHING, OR PREPARED TO DO ANYTHING TO END YOUR LIFE?: YES
6. HAVE YOU EVER DONE ANYTHING, STARTED TO DO ANYTHING, OR PREPARED TO DO ANYTHING TO END YOUR LIFE?: YES

## 2025-05-29 ASSESSMENT — PAIN SCALES - GENERAL
PAINLEVEL_OUTOF10: 8
PAINLEVEL_OUTOF10: 0 - NO PAIN
PAINLEVEL_OUTOF10: 7
PAINLEVEL_OUTOF10: 0 - NO PAIN

## 2025-05-29 ASSESSMENT — LIFESTYLE VARIABLES
PRESCIPTION_ABUSE_PAST_12_MONTHS: YES
SUBSTANCE_ABUSE_PAST_12_MONTHS: YES

## 2025-05-29 ASSESSMENT — PAIN - FUNCTIONAL ASSESSMENT
PAIN_FUNCTIONAL_ASSESSMENT: 0-10

## 2025-05-29 ASSESSMENT — PAIN DESCRIPTION - LOCATION
LOCATION: GENERALIZED
LOCATION: GENERALIZED
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
## 2025-05-29 NOTE — PROGRESS NOTES
Occupational Therapy     REHAB Therapy Assessment & Treatment    Patient Name: Denice Wheat  MRN: [id number]  Today's Date: 5/29/2025      Attendance:  Attendance  Activity: Discussion/reminisce  Participation: Unable/off unit      Encounter Problems       Encounter Problems (Active)       OT Goals       OT Goal 1       Start:  05/29/25    Expected End:  06/26/25       Communication/Interaction Skills (Self-expression/social Behavior/assertive)  Explore/demonstrate 1-2 effective methods of expressing feelings/wants/needs (can include assertion/engaging/sharing/asking) prior to discharge          OT Goal 2       Start:  05/29/25    Expected End:  06/26/25       Coping Skills  Explore/identify 1-2 effective strategies of expressing feelings, wants, needs(can include assertion, engaging, sharing, asking) prior to discharge          OT Goal 3       Start:  05/29/25    Expected End:  06/26/25        Emotion Regulation/self control  Pt will exhibit appropriate range, regulation of emotions, impulse control, frustration tolerance in OT groups prior to discharge.                    Education Documentation  No documentation found.  Education Comments  No comments found.          Additional Comments:
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
## 2025-05-29 NOTE — PROGRESS NOTES
"Occupational Therapy     REHAB Therapy Assessment & Treatment    Patient Name: Denice Wheat  MRN: [id number]  Today's Date: 5/29/2025    Attendance:  Attendance  Activity: Discussion/reminisce (Clinical Depression paper was reviewed)  Participation: Active participation    Therapeutic OT GRP (AM)  Treatment Approach  Approach : Group therapy sessions  Patient Stated Goals: \"To get help for my depression\"  Cognition: Attention, Directions (Pt attentive & focused for duration of session. Statements & contributions to discussion are linear, organized, & on-topic.)  Social Skills: Demonstrates ability to listen to others, Cooperates with others in group activity (Pt appropriately stimulated & demonstrates appropriate social skills.)  Emotional: Behaviors, Mood (Pt mood depressed, affect slightly brighter & improved since assessment.)  Treatment Approach Comments: OT GRP (AM): Clinical Depression paper was reviewed re: s/s of depression, the length of time Pts have been dealing w/depression and triggers precipitating depression. Pt followed along with the reading material and identified ~12 of 17 s/s from the handout including intense anxiety, ETOH abuse, c/o lack of energy for chores & self-care while little enjoyment in her day due to dstresful work role causing poor appetite and poor sleep with nightmares causing less than 6hrs a day. Pt actively listened as others spoke of their lack of structured routine in their day. Pt accepted a paper on the 4 areas in one's day and discussion on steps to add activity & (+) coping tools to create a balance between quality sleep, adequate self-care, productive work & healthy leisure; Understanding s/s and gaining balanced daily routine to decrease s/s of depression. Group discussed the benefits these as well as the benefits of counseling and community programing from AA mtgs to support grps to help a Pt connect w/others. Grp discussion also identified the possible need for " medication if doctor prescriped to gain balance within the brain for improved focus and decreased s/s of anxiety/depression. This Pt noted the helpfulness of healthy coping tools which include assertiveness skills, time mgt tools/tips & incorporating relaxation skills into one's daily routine to calm oneself effectively to complete chores/errands efficiently. OT and Pts reviewed the concept of blending chores with music as 1 way to improve esteem while being productive in achieving one's dailay goals. Next, Pt receptive & engaged in discussion of using distress tolerance skills during daily life activities as stress mgt tools to improve interpersonal relationships while bringing down anxiety. Also, Pt participated in discussion of effective stress management, how it promotes success in meaningful roles & occupations, & how mindfulness can improve one’s ability to handle stress. Pt receptive & demonstrates good understanding.      Encounter Problems       Encounter Problems (Active)       OT Goals       OT Goal 1       Start:  05/29/25    Expected End:  06/26/25       Communication/Interaction Skills (Self-expression/social Behavior/assertive)  Explore/demonstrate 1-2 effective methods of expressing feelings/wants/needs (can include assertion/engaging/sharing/asking) prior to discharge          OT Goal 2       Start:  05/29/25    Expected End:  06/26/25       Coping Skills  Explore/identify 1-2 effective strategies of expressing feelings, wants, needs(can include assertion, engaging, sharing, asking) prior to discharge          OT Goal 3       Start:  05/29/25    Expected End:  06/26/25        Emotion Regulation/self control  Pt will exhibit appropriate range, regulation of emotions, impulse control, frustration tolerance in OT groups prior to discharge.

## 2025-05-29 NOTE — ED NOTES
Pt seen by NP and psychiatrist and one to one discontinued. Pt currently denies any suicidal thoughts, states will come to staff if anything changes.
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
## 2025-05-29 NOTE — CARE PLAN
Problem: Pain - Adult  Goal: Verbalizes/displays adequate comfort level or baseline comfort level  Outcome: Progressing     Problem: Altered Thought Processes as Evidenced by  Goal: STG - Desires improvement in ability to think and concentrate  Outcome: Progressing     Problem: Potential for Harm to Self or Others  Goal: Participates in unit activities  Outcome: Progressing     Problem: Potential for Harm to Self or Others  Goal: Identifies stressors that lead to harmful behaviors  Outcome: Progressing     Problem: Potential for Harm to Self or Others  Goal: Denies harm toward self or others  Outcome: Progressing   The patient's goals for the shift include talk to psychiatrist and NP, attend group, shower    The clinical goals for the shift include be out of room six plus hours, attend groups     Pt up to groups and dayroom today. Pt states that pain was improved with pain meds and she is feeling better from admit. Pt states that she was feeling very hopeless at admit. Pt states that she has been in contact with her daughters and plans to get her insurance straightened out and get back on her meds. Pt initially had passive suicidal thoughts this morning but later in day  denied. Pt states she is still unsure what she is going to do at discharge but that she will work it out. Pt denies any homicidal thoughts, denies any auditory or visual hallucinations. Pt denies side effects from meds. Pt has participated in all groups today.

## 2025-05-30 LAB
HOLD SPECIMEN: NORMAL
HOLD SPECIMEN: NORMAL
T3FREE SERPL-MCNC: 3.4 PG/ML (ref 2.3–4.2)

## 2025-05-30 PROCEDURE — 99232 SBSQ HOSP IP/OBS MODERATE 35: CPT | Performed by: PSYCHIATRY & NEUROLOGY

## 2025-05-30 PROCEDURE — 2500000001 HC RX 250 WO HCPCS SELF ADMINISTERED DRUGS (ALT 637 FOR MEDICARE OP): Performed by: REGISTERED NURSE

## 2025-05-30 PROCEDURE — 84481 FREE ASSAY (FT-3): CPT | Mod: ELYLAB | Performed by: INTERNAL MEDICINE

## 2025-05-30 PROCEDURE — 97530 THERAPEUTIC ACTIVITIES: CPT | Mod: GO

## 2025-05-30 PROCEDURE — 36415 COLL VENOUS BLD VENIPUNCTURE: CPT | Performed by: INTERNAL MEDICINE

## 2025-05-30 PROCEDURE — 1240000001 HC SEMI-PRIVATE BH ROOM DAILY

## 2025-05-30 PROCEDURE — 2500000001 HC RX 250 WO HCPCS SELF ADMINISTERED DRUGS (ALT 637 FOR MEDICARE OP): Performed by: PSYCHIATRY & NEUROLOGY

## 2025-05-30 PROCEDURE — 83520 IMMUNOASSAY QUANT NOS NONAB: CPT | Performed by: INTERNAL MEDICINE

## 2025-05-30 PROCEDURE — 84445 ASSAY OF TSI GLOBULIN: CPT | Performed by: INTERNAL MEDICINE

## 2025-05-30 PROCEDURE — 97150 GROUP THERAPEUTIC PROCEDURES: CPT | Mod: GO

## 2025-05-30 RX ORDER — LITHIUM CARBONATE 150 MG/1
150 CAPSULE ORAL NIGHTLY
Status: DISPENSED | OUTPATIENT
Start: 2025-05-30

## 2025-05-30 RX ADMIN — DIVALPROEX SODIUM 500 MG: 250 TABLET, DELAYED RELEASE ORAL at 06:05

## 2025-05-30 RX ADMIN — LITHIUM CARBONATE 150 MG: 150 CAPSULE, GELATIN COATED ORAL at 21:00

## 2025-05-30 RX ADMIN — ACETAMINOPHEN 650 MG: 325 TABLET ORAL at 06:04

## 2025-05-30 RX ADMIN — TRAMADOL HYDROCHLORIDE 50 MG: 50 TABLET, FILM COATED ORAL at 21:00

## 2025-05-30 RX ADMIN — LURASIDONE HYDROCHLORIDE 20 MG: 40 TABLET, FILM COATED ORAL at 17:36

## 2025-05-30 RX ADMIN — PRAZOSIN HYDROCHLORIDE 2 MG: 1 CAPSULE ORAL at 06:05

## 2025-05-30 RX ADMIN — PRAZOSIN HYDROCHLORIDE 2 MG: 1 CAPSULE ORAL at 17:36

## 2025-05-30 RX ADMIN — DIVALPROEX SODIUM 500 MG: 250 TABLET, DELAYED RELEASE ORAL at 17:36

## 2025-05-30 RX ADMIN — PANTOPRAZOLE SODIUM 40 MG: 40 TABLET, DELAYED RELEASE ORAL at 06:05

## 2025-05-30 ASSESSMENT — COLUMBIA-SUICIDE SEVERITY RATING SCALE - C-SSRS
6. HAVE YOU EVER DONE ANYTHING, STARTED TO DO ANYTHING, OR PREPARED TO DO ANYTHING TO END YOUR LIFE?: PATIENT DENIES ANY THOUGHTS OF SI AT THIS TIME
1. IN THE PAST MONTH, HAVE YOU WISHED YOU WERE DEAD OR WISHED YOU COULD GO TO SLEEP AND NOT WAKE UP?: YES
6. HAVE YOU EVER DONE ANYTHING, STARTED TO DO ANYTHING, OR PREPARED TO DO ANYTHING TO END YOUR LIFE?: YES
6. HAVE YOU EVER DONE ANYTHING, STARTED TO DO ANYTHING, OR PREPARED TO DO ANYTHING TO END YOUR LIFE?: YES
5. HAVE YOU STARTED TO WORK OUT OR WORKED OUT THE DETAILS OF HOW TO KILL YOURSELF? DO YOU INTEND TO CARRY OUT THIS PLAN?: NO
5. HAVE YOU STARTED TO WORK OUT OR WORKED OUT THE DETAILS OF HOW TO KILL YOURSELF? DO YOU INTEND TO CARRY OUT THIS PLAN?: NO
6. HAVE YOU EVER DONE ANYTHING, STARTED TO DO ANYTHING, OR PREPARED TO DO ANYTHING TO END YOUR LIFE?: YES
1. IN THE PAST MONTH, HAVE YOU WISHED YOU WERE DEAD OR WISHED YOU COULD GO TO SLEEP AND NOT WAKE UP?: YES
2. HAVE YOU ACTUALLY HAD ANY THOUGHTS OF KILLING YOURSELF?: YES
6. HAVE YOU EVER DONE ANYTHING, STARTED TO DO ANYTHING, OR PREPARED TO DO ANYTHING TO END YOUR LIFE?: PT DENIES SI AT THIS TIME
6. HAVE YOU EVER DONE ANYTHING, STARTED TO DO ANYTHING, OR PREPARED TO DO ANYTHING TO END YOUR LIFE?: YES
4. HAVE YOU HAD THESE THOUGHTS AND HAD SOME INTENTION OF ACTING ON THEM?: YES
4. HAVE YOU HAD THESE THOUGHTS AND HAD SOME INTENTION OF ACTING ON THEM?: YES
2. HAVE YOU ACTUALLY HAD ANY THOUGHTS OF KILLING YOURSELF?: YES

## 2025-05-30 ASSESSMENT — PAIN - FUNCTIONAL ASSESSMENT
PAIN_FUNCTIONAL_ASSESSMENT: 0-10

## 2025-05-30 ASSESSMENT — PAIN DESCRIPTION - ORIENTATION: ORIENTATION: RIGHT;LEFT

## 2025-05-30 ASSESSMENT — PAIN DESCRIPTION - DESCRIPTORS
DESCRIPTORS: ACHING
DESCRIPTORS: ACHING

## 2025-05-30 ASSESSMENT — PAIN DESCRIPTION - LOCATION: LOCATION: CHEST

## 2025-05-30 ASSESSMENT — PAIN SCALES - GENERAL
PAINLEVEL_OUTOF10: 0 - NO PAIN
PAINLEVEL_OUTOF10: 7
PAINLEVEL_OUTOF10: 3
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
## 2025-05-30 NOTE — CARE PLAN
"The patient's goals for the shift include \"work on finding something positive about myself\"    The clinical goals for the shift include Patient will attend at least one group throughout the shift    Over the shift, the patient did make progress toward the following goals. Patient has been cooperative throughout the shift. She has been out of her room and in the day room interacting with staff and peers. She attended group and ate breakfast and lunch. She as offered to take a shower and change her clothes but wishes to wait until later. She denies any SI, HI or hallucinations. She denies any need for PRN's. Nursing will continue to monitor and encourage.   "
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
## 2025-05-30 NOTE — PROGRESS NOTES
SUNS LSW talked with pt. Pt shared they do not want to go home to their partner. Pt said they are interested in residential MARIA treatment. Pt said they have not been inpatient treatment but have been involved in IOP before.  Pt said they would like to do 30 day treatment then sober living and get a job. SUNS asked if pt wants ALAN to put a referral in. Pt said yes. SUNS gave pt info about "[facility]" and SUNS card. SUNS put referral in at "[facility]".        OMAR Villatoro

## 2025-05-30 NOTE — PROGRESS NOTES
"Occupational Therapy     REHAB Therapy Assessment & Treatment    Patient Name: Denice Wheat  MRN: [id number]  Today's Date: 5/30/2025    Attendance:  Attendance  Activity: Discussion/reminisce (“Letting Go of Stress” VHS tape watched & practiced by Pts)  Participation: Active participation    Therapeutic OT GRP(PM)  Treatment Approach  Approach : Group therapy sessions  Patient Stated Goals: \"To get myself back on meds and into counseling because I want my life back.\"  Cognition: Attention, Directions (Pt's statements & contributions to discussion are linear, organized, & on-topic. Pt demonstrated good retention of info reviewed the day earlier; putting concepts together between (+) coping tools and need for medication compliance.)  Social Skills: Cooperates with others in group activity, Interacts independently in social activity  Emotional: Behaviors, Mood (Pt mood calm: Attitude: cooperative. Behavior: Appropriate eye contact. Pt mood euthymic.)  Treatment Approach Comments: OT GRP(PM) “Letting Go of Stress” DVD, Pt performed stress management/relaxation techniques of Acupressure, self-massage, PLB gentle stretches & Progressive Muscle tensing/release the educational DVD “Letting GO of Stress”.  Relaxation audio tape practiced followed by review of an educational handout & discussion on benefits of stress management & relaxation techniques; Pt willingly practiced progressive muscle tensing/release & PLB followed by Guided imagery along with the audio tape. Group discussed the benefits these as well as practicing the relaxation techniques including Guided Imagery with (+) self-talk phrases to safely decrease anger & panic. Next,Pt receptive & engaged in discussion of using distress tolerance skills during daily life activities. Pt actively engaged & follow along w/all of the exercises. Afterward, Pt reported feeling calmer & enjoying oneself. Also, Pt participated in discussion of effective stress management, how " it promotes success in meaningful roles & occupations, & how mindfulness can improve one’s ability to handle stress. Pt receptive & demonstrates good understanding.      Encounter Problems       Encounter Problems (Active)       OT Goals       OT Goal 1       Start:  05/29/25    Expected End:  06/26/25       Communication/Interaction Skills (Self-expression/social Behavior/assertive)  Explore/demonstrate 1-2 effective methods of expressing feelings/wants/needs (can include assertion/engaging/sharing/asking) prior to discharge          OT Goal 2       Start:  05/29/25    Expected End:  06/26/25       Coping Skills  Explore/identify 1-2 effective strategies of expressing feelings, wants, needs(can include assertion, engaging, sharing, asking) prior to discharge          OT Goal 3       Start:  05/29/25    Expected End:  06/26/25        Emotion Regulation/self control  Pt will exhibit appropriate range, regulation of emotions, impulse control, frustration tolerance in OT groups prior to discharge.

## 2025-05-30 NOTE — PROGRESS NOTES
Denice Wheat is a 44 y.o. female on day 2 of admission presenting with bipolar disorder.    Subjective   Patient reports improved mood.  Has been active on the unit, participating in group therapy.  Still dealing with stressors from the events that led to her hospitalization.  Understands that she is not going to return home as it is not safe for her there.  Patient interested in residential MARIA treatment spoke with substance use navigator and has been referred to [facility].  Spoke with patient about starting lithium for treatment of chronic suicidality and mood stabilization.  Patient agreeable to start 150 mg oral daily at bedtime.    Objective     MSE  General: Appropriately groomed and dressed.  Appearance: Appears stated age.  Attitude: Calm, cooperative.  Behavior: Appropriate eye contact.  Motor activity: No agitation or retardation. no EPS.  Normal gait.  Speech: Regular rate, rhythm, volume and tone.  Mood: Less depressed  Affect: Appropriate range  Thought process: Organized, linear, goal-directed.  Associations are logical.  Thought content: Does not endorse suicidal or homicidal ideation, no delusions elicited.  Thought perception: Did not endorse auditory or visual hallucinations.  Cognition: Alert, oriented x3.  no deficit in memory or attention.  Insight: Fair.  Judgment: Fair.    Last Recorded Vitals  /70   Pulse 74   Temp 36.4 °C (97.5 °F)   Resp 16   SpO2 93%      Relevant Results  Scheduled medications  Scheduled Medications[1]  Continuous medications  Continuous Medications[2]  PRN medications  PRN Medications[3]    Results for orders placed or performed during the hospital encounter of 05/28/25 (from the past 24 hours)   Lavender Top   Result Value Ref Range    Extra Tube     PST Top   Result Value Ref Range    Extra Tube          Assessment/Plan   Diagnosis:  Bipolar depression    Impression:     Labs and Chart: reviewed  Case discussed with treatment team members  Encouraged  patient to attend group and other mileu activity  Collateral from family - pending  Discharge planing  Medication:       - Reviewed. To continue as ordered    Medication Consent  Medication Consent: risks, benefits, side effects reviewed for all ordered meds and patient expressed understanding and consent obtained    GOKUL Church-CNP          [1] divalproex, 500 mg, oral, q12h COSME  lithium, 150 mg, oral, Nightly  lurasidone, 20 mg, oral, Daily with evening meal  pantoprazole, 40 mg, oral, Daily before breakfast  prazosin, 2 mg, oral, q12h COSME  traMADol, 50 mg, oral, Nightly  [2]    [3] PRN medications: acetaminophen, alum-mag hydroxide-simeth, hydrOXYzine HCL, OLANZapine, OLANZapine

## 2025-05-30 NOTE — PROGRESS NOTES
"Occupational Therapy     REHAB Therapy Assessment & Treatment    Patient Name: Denice Wheat  MRN: [id number]  Today's Date: 5/30/2025    Attendance:  Attendance  Activity: One-to-one (“Short Circuiting Stress” paper was reviewed)  Participation: Active participation    1:1 OT Therapeutic Act:  Treatment Approach  Approach : 1 to1 Therapy sessions  Patient Stated Goals: \"To get myself back on meds and into counseling because I want my life back.\"  Cognition: Attention, Directions (Pt's statements & contributions to discussion are linear, organized, & on-topic. Pt demonstrated good retention of info reviewed the day earlier; putting concepts together between (+) coping tools and need for medication compliance.)  Social Skills: Cooperates with others in group activity, Interacts independently in social activity  Emotional: Behaviors, Mood (Pt mood calm: Attitude: cooperative. Behavior: Appropriate eye contact. Pt mood euthymic.)  Treatment Approach Comments: OT 1:1 Ther. Act:“Short Circuiting Stress” paper was reviewed and the \"A+B=C\" equation was discussed. Pt discussed the fact that there are (+) and (-) stressors, many of which we can’t avoid. Pt educated on point that while one cannot totally avoid stress, one has the ability to choose how one responds/reacts to stress. Pt quick to identify many (+) and (-) ways that people react to stress. From the handout, Pt identified rational problem solving tips i.e.: \"...shift focus forward for a solution, seeing (+) in (-) situations, seeking out a fresh point of view from a friend, get a fresh perspective by stepping away from the situation to clear one’s head and be aware of the toxic things people might say to themselves & try to quickly turn around those negative comments to more realistic and positive self-talk statements in decreasing stress. Pt also noted the tips from “Short Circuiting Stress” handout i.e. \"... avoid \"blaming\" while being compliant w/counseling for " the healthy. Pt noted a goal to stay compliant w/meds to avoid relapse w/mental health issues which was insightful for Pt.      Encounter Problems       Encounter Problems (Active)       OT Goals       OT Goal 1       Start:  05/29/25    Expected End:  06/26/25       Communication/Interaction Skills (Self-expression/social Behavior/assertive)  Explore/demonstrate 1-2 effective methods of expressing feelings/wants/needs (can include assertion/engaging/sharing/asking) prior to discharge          OT Goal 2       Start:  05/29/25    Expected End:  06/26/25       Coping Skills  Explore/identify 1-2 effective strategies of expressing feelings, wants, needs(can include assertion, engaging, sharing, asking) prior to discharge          OT Goal 3       Start:  05/29/25    Expected End:  06/26/25        Emotion Regulation/self control  Pt will exhibit appropriate range, regulation of emotions, impulse control, frustration tolerance in OT groups prior to discharge.

## 2025-05-31 ENCOUNTER — APPOINTMENT (OUTPATIENT)
Dept: RADIOLOGY | Facility: HOSPITAL | Age: 45
End: 2025-05-31
Payer: MEDICARE

## 2025-05-31 PROCEDURE — 1240000001 HC SEMI-PRIVATE BH ROOM DAILY

## 2025-05-31 PROCEDURE — 2500000001 HC RX 250 WO HCPCS SELF ADMINISTERED DRUGS (ALT 637 FOR MEDICARE OP): Performed by: PSYCHIATRY & NEUROLOGY

## 2025-05-31 PROCEDURE — 76536 US EXAM OF HEAD AND NECK: CPT

## 2025-05-31 PROCEDURE — 76536 US EXAM OF HEAD AND NECK: CPT | Performed by: RADIOLOGY

## 2025-05-31 PROCEDURE — 99232 SBSQ HOSP IP/OBS MODERATE 35: CPT | Performed by: PSYCHIATRY & NEUROLOGY

## 2025-05-31 PROCEDURE — 97150 GROUP THERAPEUTIC PROCEDURES: CPT | Mod: GO

## 2025-05-31 PROCEDURE — 2500000001 HC RX 250 WO HCPCS SELF ADMINISTERED DRUGS (ALT 637 FOR MEDICARE OP): Performed by: REGISTERED NURSE

## 2025-05-31 RX ORDER — TRAMADOL HYDROCHLORIDE 50 MG/1
50 TABLET, FILM COATED ORAL 2 TIMES DAILY
Status: DISCONTINUED | OUTPATIENT
Start: 2025-05-31 | End: 2025-06-08

## 2025-05-31 RX ADMIN — LITHIUM CARBONATE 150 MG: 150 CAPSULE, GELATIN COATED ORAL at 20:28

## 2025-05-31 RX ADMIN — TRAMADOL HYDROCHLORIDE 50 MG: 50 TABLET, FILM COATED ORAL at 20:28

## 2025-05-31 RX ADMIN — LURASIDONE HYDROCHLORIDE 20 MG: 40 TABLET, FILM COATED ORAL at 17:32

## 2025-05-31 RX ADMIN — TRAMADOL HYDROCHLORIDE 50 MG: 50 TABLET, FILM COATED ORAL at 08:00

## 2025-05-31 RX ADMIN — DIVALPROEX SODIUM 500 MG: 250 TABLET, DELAYED RELEASE ORAL at 17:32

## 2025-05-31 RX ADMIN — ACETAMINOPHEN 650 MG: 325 TABLET ORAL at 06:42

## 2025-05-31 RX ADMIN — PRAZOSIN HYDROCHLORIDE 2 MG: 1 CAPSULE ORAL at 06:37

## 2025-05-31 RX ADMIN — PANTOPRAZOLE SODIUM 40 MG: 40 TABLET, DELAYED RELEASE ORAL at 08:00

## 2025-05-31 RX ADMIN — DIVALPROEX SODIUM 500 MG: 250 TABLET, DELAYED RELEASE ORAL at 06:37

## 2025-05-31 RX ADMIN — PRAZOSIN HYDROCHLORIDE 2 MG: 1 CAPSULE ORAL at 17:32

## 2025-05-31 ASSESSMENT — PAIN - FUNCTIONAL ASSESSMENT
PAIN_FUNCTIONAL_ASSESSMENT: 0-10

## 2025-05-31 ASSESSMENT — COLUMBIA-SUICIDE SEVERITY RATING SCALE - C-SSRS
4. HAVE YOU HAD THESE THOUGHTS AND HAD SOME INTENTION OF ACTING ON THEM?: YES
2. HAVE YOU ACTUALLY HAD ANY THOUGHTS OF KILLING YOURSELF?: YES
5. HAVE YOU STARTED TO WORK OUT OR WORKED OUT THE DETAILS OF HOW TO KILL YOURSELF? DO YOU INTEND TO CARRY OUT THIS PLAN?: NO
4. HAVE YOU HAD THESE THOUGHTS AND HAD SOME INTENTION OF ACTING ON THEM?: YES
1. IN THE PAST MONTH, HAVE YOU WISHED YOU WERE DEAD OR WISHED YOU COULD GO TO SLEEP AND NOT WAKE UP?: YES
6. HAVE YOU EVER DONE ANYTHING, STARTED TO DO ANYTHING, OR PREPARED TO DO ANYTHING TO END YOUR LIFE?: PATIENT DENIES ANY THOUGHTS OF SI AT THIS TIME
6. HAVE YOU EVER DONE ANYTHING, STARTED TO DO ANYTHING, OR PREPARED TO DO ANYTHING TO END YOUR LIFE?: YES
5. HAVE YOU STARTED TO WORK OUT OR WORKED OUT THE DETAILS OF HOW TO KILL YOURSELF? DO YOU INTEND TO CARRY OUT THIS PLAN?: NO
6. HAVE YOU EVER DONE ANYTHING, STARTED TO DO ANYTHING, OR PREPARED TO DO ANYTHING TO END YOUR LIFE?: YES
1. IN THE PAST MONTH, HAVE YOU WISHED YOU WERE DEAD OR WISHED YOU COULD GO TO SLEEP AND NOT WAKE UP?: YES
2. HAVE YOU ACTUALLY HAD ANY THOUGHTS OF KILLING YOURSELF?: YES

## 2025-05-31 ASSESSMENT — PAIN DESCRIPTION - LOCATION
LOCATION: CHEST
LOCATION: CHEST

## 2025-05-31 ASSESSMENT — PAIN DESCRIPTION - ORIENTATION: ORIENTATION: RIGHT;LEFT

## 2025-05-31 ASSESSMENT — PAIN DESCRIPTION - DESCRIPTORS
DESCRIPTORS: ACHING
DESCRIPTORS: ACHING;SORE

## 2025-05-31 ASSESSMENT — PAIN SCALES - GENERAL
PAINLEVEL_OUTOF10: 6
PAINLEVEL_OUTOF10: 6
PAINLEVEL_OUTOF10: 0 - NO PAIN

## 2025-05-31 NOTE — PROGRESS NOTES
"Occupational Therapy     REHAB Therapy Assessment & Treatment    Patient Name: Denice Wheat  MRN: [id number]  Today's Date: 5/31/2025    Attendance:  Attendance  Activity: Discussion/reminisce (OT GRP (AM) \"Causing My Own Sadness\" paper was reviewed.)  Participation: Active participation    Therapeutic OT GRP(AM)  Treatment Approach  Approach : Group therapy sessions  Patient Stated Goals: \"To check out an Inpatient C.D program to get sober.\"  Cognition: Attention, Directions (Pt attentive & focused for duration of session. Statements & contributions to discussion are linear, organized, & on-topic. Pt follows complex multi-step directions independently.)  Social Skills: Demonstrates ability to listen to others, Cooperates with others in group activity (Pt appropriately stimulated & demonstrates social skills, Pt is appropriately spontaneous during interactions WFL through active listening, turn-taking, calm & cooperative behavior, and good eye contact.)  Emotional: Mood, Behaviors (Brighter affect, hopeful, re: Pt's emotions per Pt.)  Stress Management/Relaxation Training: Performs stress management/relaxation techniques, Utilizes stress management/relaxation techniques (Pt noted that she utilizes accupressure tip of the Palm massaging to calm herself when anxiously talking to an aggressive person.)  Treatment Approach Comments: OT GRP (AM) \"Causing My Own Sadness\" paper was reviewed. First, Pt was given a paper with ambiguous images & group discussed how it is that 1 person sees the young woman, another sees an old lady while yet another Pt sees both images. Pts related this to \"seeing choices\" vs tunnel vision which many experience when stressed. Pts completed a paper on identifying whether they did or didn't have control over a situation such as death, fear, rain, jealousy, fire, or fighting. Pts took turns discussing how stress from deciding if they had control over these situations can create tunnel vision or " "“Black/White” thinking can impede rational thinking and impair one’s self esteem. Each patient noted how this irrational thinking can actually \"cause your own sadness\" as the paper indicated. With verbal prompts, Pt reviewed the 5 points from yesterday's [facility] Short Circuiting Stress paper re:(a) the avoidance of blame which only adds to stress, (b) controlling one's self-talk by keeping it realistic and (+), (c) try to find the good in a bad situation to focus on, (d) shift one's focus forward for a solution and (e) get a fresh perspective by talking to others or taking a break from the situation to calm oneself. Pts discussed rational problem solving tips like reframing (-) thoughts to put issues in a healthy perspective. OT also noted the benefits of humor and stepping away from a stressful situation to gain a new perspective. Pts discussed how one's point of view can be skewed in a (-) way so practicing the coping tools learned in the groups such as relaxation skills, (+) self-talk & assertiveness can helpful in addition to being compliant with medications and counseling. Pt receptive & demonstrates good understanding & insight immerging on these concepts of (+) coping tools along with outpatient services and medication compliance to move forward in living a healthy lifestyle.      Encounter Problems       Encounter Problems (Active)       OT Goals       OT Goal 1       Start:  05/29/25    Expected End:  06/26/25       Communication/Interaction Skills (Self-expression/social Behavior/assertive)  Explore/demonstrate 1-2 effective methods of expressing feelings/wants/needs (can include assertion/engaging/sharing/asking) prior to discharge          OT Goal 2       Start:  05/29/25    Expected End:  06/26/25       Coping Skills  Explore/identify 1-2 effective strategies of expressing feelings, wants, needs(can include assertion, engaging, sharing, asking) prior to discharge          OT Goal 3       Start:  05/29/25    " Expected End:  06/26/25        Emotion Regulation/self control  Pt will exhibit appropriate range, regulation of emotions, impulse control, frustration tolerance in OT groups prior to discharge.                    Education Documentation  No documentation found.  Education Comments  No comments found.          Additional Comments:

## 2025-05-31 NOTE — CARE PLAN
Problem: Pain - Adult  Goal: Verbalizes/displays adequate comfort level or baseline comfort level  Outcome: Progressing   The patient's goals for the shift include sleep    The clinical goals for the shift include safety    Over the shift, the patient did not make progress toward the following goals. Barriers to progression include clinical presentation. Recommendations to address these barriers include encourage medication compliance.    Pt out and visible on unit this evening. Pt observed watching t.v. most of evening however was mostly isolative to self. Pt reported low anxiety and moderate depression persists. Pt denies self harm thoughts. Pt did take shower and took HS medications. Pt observed resting in bed all night.

## 2025-05-31 NOTE — CARE PLAN
Problem: Safety - Adult  Goal: Free from fall injury  Outcome: Progressing     Problem: Pain - Adult  Goal: Verbalizes/displays adequate comfort level or baseline comfort level  Outcome: Progressing     Problem: Altered Thought Processes as Evidenced by  Goal: STG - Desires improvement in ability to think and concentrate  Outcome: Progressing     Problem: Potential for Harm to Self or Others  Goal: Patient/Family participate in treatment and discharge plans  Outcome: Progressing     Problem: Potential for Harm to Self or Others  Goal: Denies harm toward self or others  Outcome: Progressing     Problem: Ineffective Coping  Goal: Identifies healthy coping skills  Outcome: Progressing   The patient's goals for the shift include attend group    The clinical goals for the shift include be out of room, attend groups       Pt has been up reading today much of day, pt states that this is good coping skill for her. Pt states that pain is continuing to improve. Pt up to groups and meals. Pt visited with family today. Pt states that she has had some weird dreams and did discuss this with the dr. Pt states that she recognized playing cards gave here anxiety yesterday and was triggering to her because it was something she did often with her ex and they would drink a lot. Pt states that she is hopeful about getting into [facility]. Pt rates depression a 5/10 and anxiety a 2/10 with ten being the worst it can be and one being nothing.

## 2025-05-31 NOTE — PROGRESS NOTES
Denice Wheat is a 44 y.o. female on day 3 of admission was feeling quite depressed this morning as she is coming to terms with how the suicide attempt put her and her partner's life at risk.  She was experiencing some vivid dreams last night.  She tolerated lithium well.  Endocrinology did not initiate the methimazole and recommended outpatient follow-up.  Patient is tolerating the maria milieu well.   Patient is attending the groups and meetings and finds them useful in developing more understanding of their symptoms and developing coping skills. Patient is tolerating medications well.   Labs Reviewed.  Vitals Reviewed.   Notes Reviewed.   No EPS, TD, vitals stable.      MSE: Patient was alert, oriented to time, place, person and situation. Patient appears well groomed and clad in climate appropriate clothes. Patient is cooperative on approach. Recent and remote memory within normal limits. Memory registration and recall within normal limits. Attention and concentration within normal limits. Speech normal in rate, rhythm and volume. Good eye contact. Thought process Linear. Intact associations. Good fund of knowledge. Mood sad and affect constricted. Patient did not endorse any delusions. Patient denied any auditory visual hallucinations. Patient has denied any active suicidal  ideations and is not future oriented.  Patient has fair insight, fair judgment and good impulse control.     Musculoskeletal: Normal gait, no Parkinsonism, no Dystonia, no Akathisia, no TD. Psychomotor activity within normal limits.     Diagnosis: Bipolar 1 depressed    Assessment and Plan:     Continue current treatment  Patient is ready to go to inpatient rehab    Need for Inpatient Care: Medication titration, monitoring for side effects mood and impulsivity.  Plan for inpatient psychiatric care: Continue with psychiatric care in [facility].  Continue with current treatment and medication adjustments. Patient is agreeable with continued  medication management and adjustments discussed.       Last Recorded Vitals  /74   Pulse 71   Temp 36.3 °C (97.3 °F)   Resp 16   SpO2 92%      Recent Results (from the past 24 hours)   T3, free    Collection Time: 05/30/25  8:14 AM   Result Value Ref Range    Triiodothyronine, Free 3.4 2.3 - 4.2 pg/mL   Lavender Top    Collection Time: 05/30/25  8:14 AM   Result Value Ref Range    Extra Tube     PST Top    Collection Time: 05/30/25  8:14 AM   Result Value Ref Range    Extra Tube          Current Medications[1]       Florin Villa MD         [1]   Current Facility-Administered Medications:     acetaminophen (Tylenol) tablet 650 mg, 650 mg, oral, q6h PRN, SANIA Church, 650 mg at 05/31/25 0642    alum-mag hydroxide-simeth (Mylanta) 200-200-20 mg/5 mL oral suspension 10 mL, 10 mL, oral, 4x daily PRN, SANIA Church    divalproex (Depakote) delayed release tablet 500 mg, 500 mg, oral, q12h COSME, SANIA Church, 500 mg at 05/31/25 0637    hydrOXYzine HCL (Atarax) tablet 50 mg, 50 mg, oral, q6h PRN, SANIA Church    lithium capsule 150 mg, 150 mg, oral, Nightly, Florin Villa MD, 150 mg at 05/30/25 2100    lurasidone (Latuda) tablet 20 mg, 20 mg, oral, Daily with evening meal, SANIA Church, 20 mg at 05/30/25 1736    OLANZapine (ZyPREXA) injection 5 mg, 5 mg, intramuscular, q6h PRN, SANIA Church    OLANZapine (ZyPREXA) tablet 5 mg, 5 mg, oral, q6h PRN, SANIA Church    pantoprazole (ProtoNix) EC tablet 40 mg, 40 mg, oral, Daily before breakfast, SANIA Church, 40 mg at 05/31/25 0800    prazosin (Minipress) capsule 2 mg, 2 mg, oral, q12h COSME, SANIA Church, 2 mg at 05/31/25 0637    traMADol (Ultram) tablet 50 mg, 50 mg, oral, BID, Florin Villa MD, 50 mg at 05/31/25 0800

## 2025-05-31 NOTE — PROGRESS NOTES
"Occupational Therapy     REHAB Therapy Assessment & Treatment    Patient Name: Denice Wheat  MRN: [id number]  Today's Date: 5/31/2025    Attendance:  Attendance  Activity: Discussion/reminisce (OT GRP (PM): Pt actively participated in the discussion and demonstration of the \"Practice Makes Better\" relaxation skills info.)  Participation: Active participation    Therapeutic OT GRP (PM)  Treatment Approach  Approach : Group therapy sessions  Patient Stated Goals: \"To check out an Inpatient C.D program to get sober.\"  Cognition: Attention, Directions (Pt attentive & focused for duration of session. Statements & contributions to discussion are linear, organized, & on-topic. Pt follows complex multi-step directions independently.)  Social Skills: Demonstrates ability to listen to others, Cooperates with others in group activity (Pt appropriately stimulated & demonstrates social skills, Pt is appropriately spontaneous during interactions WFL through active listening, turn-taking, calm & cooperative behavior, and good eye contact.)  Emotional: Mood, Behaviors (Brighter affect, hopeful, re: Pt's emotions per Pt.)  Stress Management/Relaxation Training: Performs stress management/relaxation techniques, Utilizes stress management/relaxation techniques (Pt noted that she utilizes accupressure tip of the Palm massaging to calm herself when anxiously talking to an aggressive person.)  Treatment Approach Comments: OT GRP (PM): Pt actively participated in the discussion and demonstration of the \"Practice Makes Better\" relaxation skills info. Pt was educated on the relaxation techniques of the \"Life Saver\" Grounding technique, PLB, Guided Imagery & Progressive Muscle tensing/release, Pts were taught how to use Visualization to incorporate all 5 senses in calming oneself. Next Pts practiced Progressive Muscle tensing/release to gently decrease muscle pressure & tightness with tensing/release while using PLB to safely decrease " panic. In addition, as part of relaxation skills development, Pts practiced (+) self-talk phrases a distraction technique. Counting backwards from 100 or saying the Alphabet backwards was also discussed as (+) tools to distract oneself from irrational, (-) thinking. Journaling was talked about as an easy flora to get (-) thoughts out of one’s head to release anxiety especially at night before going to bed for better sleep. All strategies reviewed & discussed as Pts noted the use of 2 or more skills as more effective than just relying one tool to effectively calm oneself. Pt given educational handouts describing different relaxation techniques including PLB, stretching, acupressure/self-massage & PMR. All of these techniques were discussed in group as well as practiced to safely decrease anger & to safely decrease panic. Pt spoke up spontaneously to share knowledge of these skills & on other stress/anxiety management strategies. Pt set a goal to incorporate these coping techniques into one’s day to create a balance in the day and to improve overall sleep. Pt receptive & engaged in discussion of using distress tolerance skills during daily life activities.      Encounter Problems       Encounter Problems (Active)       OT Goals       OT Goal 1       Start:  05/29/25    Expected End:  06/26/25       Communication/Interaction Skills (Self-expression/social Behavior/assertive)  Explore/demonstrate 1-2 effective methods of expressing feelings/wants/needs (can include assertion/engaging/sharing/asking) prior to discharge          OT Goal 2       Start:  05/29/25    Expected End:  06/26/25       Coping Skills  Explore/identify 1-2 effective strategies of expressing feelings, wants, needs(can include assertion, engaging, sharing, asking) prior to discharge          OT Goal 3       Start:  05/29/25    Expected End:  06/26/25        Emotion Regulation/self control  Pt will exhibit appropriate range, regulation of emotions,  impulse control, frustration tolerance in OT groups prior to discharge.

## 2025-06-01 PROCEDURE — 97150 GROUP THERAPEUTIC PROCEDURES: CPT | Mod: GO

## 2025-06-01 PROCEDURE — 2500000001 HC RX 250 WO HCPCS SELF ADMINISTERED DRUGS (ALT 637 FOR MEDICARE OP): Performed by: REGISTERED NURSE

## 2025-06-01 PROCEDURE — 1240000001 HC SEMI-PRIVATE BH ROOM DAILY

## 2025-06-01 PROCEDURE — 2500000001 HC RX 250 WO HCPCS SELF ADMINISTERED DRUGS (ALT 637 FOR MEDICARE OP): Performed by: PSYCHIATRY & NEUROLOGY

## 2025-06-01 PROCEDURE — 99232 SBSQ HOSP IP/OBS MODERATE 35: CPT | Performed by: PSYCHIATRY & NEUROLOGY

## 2025-06-01 RX ORDER — PRAZOSIN HYDROCHLORIDE 1 MG/1
2 CAPSULE ORAL EVERY MORNING
Status: DISPENSED | OUTPATIENT
Start: 2025-06-01

## 2025-06-01 RX ORDER — LURASIDONE HYDROCHLORIDE 40 MG/1
40 TABLET, FILM COATED ORAL
Status: DISCONTINUED | OUTPATIENT
Start: 2025-06-01 | End: 2025-06-02

## 2025-06-01 RX ORDER — IBUPROFEN 600 MG/1
600 TABLET, FILM COATED ORAL EVERY 8 HOURS PRN
Status: ACTIVE | OUTPATIENT
Start: 2025-06-01

## 2025-06-01 RX ORDER — PRAZOSIN HYDROCHLORIDE 1 MG/1
3 CAPSULE ORAL NIGHTLY
Status: DISCONTINUED | OUTPATIENT
Start: 2025-06-01 | End: 2025-06-02

## 2025-06-01 RX ADMIN — TRAMADOL HYDROCHLORIDE 50 MG: 50 TABLET, FILM COATED ORAL at 21:25

## 2025-06-01 RX ADMIN — DIVALPROEX SODIUM 500 MG: 250 TABLET, DELAYED RELEASE ORAL at 17:34

## 2025-06-01 RX ADMIN — LURASIDONE HYDROCHLORIDE 40 MG: 40 TABLET, FILM COATED ORAL at 17:34

## 2025-06-01 RX ADMIN — LITHIUM CARBONATE 150 MG: 150 CAPSULE, GELATIN COATED ORAL at 21:25

## 2025-06-01 RX ADMIN — TRAMADOL HYDROCHLORIDE 50 MG: 50 TABLET, FILM COATED ORAL at 08:36

## 2025-06-01 RX ADMIN — PANTOPRAZOLE SODIUM 40 MG: 40 TABLET, DELAYED RELEASE ORAL at 08:36

## 2025-06-01 RX ADMIN — PRAZOSIN HYDROCHLORIDE 2 MG: 1 CAPSULE ORAL at 08:36

## 2025-06-01 RX ADMIN — DIVALPROEX SODIUM 500 MG: 250 TABLET, DELAYED RELEASE ORAL at 06:31

## 2025-06-01 RX ADMIN — PRAZOSIN HYDROCHLORIDE 3 MG: 1 CAPSULE ORAL at 21:25

## 2025-06-01 RX ADMIN — PRAZOSIN HYDROCHLORIDE 2 MG: 1 CAPSULE ORAL at 06:31

## 2025-06-01 ASSESSMENT — PAIN - FUNCTIONAL ASSESSMENT
PAIN_FUNCTIONAL_ASSESSMENT: 0-10

## 2025-06-01 ASSESSMENT — COLUMBIA-SUICIDE SEVERITY RATING SCALE - C-SSRS
6. HAVE YOU EVER DONE ANYTHING, STARTED TO DO ANYTHING, OR PREPARED TO DO ANYTHING TO END YOUR LIFE?: YES
5. HAVE YOU STARTED TO WORK OUT OR WORKED OUT THE DETAILS OF HOW TO KILL YOURSELF? DO YOU INTEND TO CARRY OUT THIS PLAN?: NO
6. HAVE YOU EVER DONE ANYTHING, STARTED TO DO ANYTHING, OR PREPARED TO DO ANYTHING TO END YOUR LIFE?: YES
4. HAVE YOU HAD THESE THOUGHTS AND HAD SOME INTENTION OF ACTING ON THEM?: YES
6. HAVE YOU EVER DONE ANYTHING, STARTED TO DO ANYTHING, OR PREPARED TO DO ANYTHING TO END YOUR LIFE?: YES
4. HAVE YOU HAD THESE THOUGHTS AND HAD SOME INTENTION OF ACTING ON THEM?: YES
6. HAVE YOU EVER DONE ANYTHING, STARTED TO DO ANYTHING, OR PREPARED TO DO ANYTHING TO END YOUR LIFE?: YES
1. IN THE PAST MONTH, HAVE YOU WISHED YOU WERE DEAD OR WISHED YOU COULD GO TO SLEEP AND NOT WAKE UP?: YES
2. HAVE YOU ACTUALLY HAD ANY THOUGHTS OF KILLING YOURSELF?: YES
1. IN THE PAST MONTH, HAVE YOU WISHED YOU WERE DEAD OR WISHED YOU COULD GO TO SLEEP AND NOT WAKE UP?: YES
2. HAVE YOU ACTUALLY HAD ANY THOUGHTS OF KILLING YOURSELF?: YES
5. HAVE YOU STARTED TO WORK OUT OR WORKED OUT THE DETAILS OF HOW TO KILL YOURSELF? DO YOU INTEND TO CARRY OUT THIS PLAN?: NO

## 2025-06-01 ASSESSMENT — PAIN SCALES - GENERAL
PAINLEVEL_OUTOF10: 8
PAINLEVEL_OUTOF10: 2
PAINLEVEL_OUTOF10: 0 - NO PAIN
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
## 2025-06-01 NOTE — CARE PLAN
The patient's goals for the shift include sleep    The clinical goals for the shift include Patient will sleep at least 6 hours    Over the shift, the patient did make progress toward the following goals. Patient was cooperative throughout the shift. She was out of her room and in the dayroom before going to bed. She denies any SI, HI or hallucinations. She was complaint with medication and denied any need for PRN's. She slept most of the night waking once to use the bathroom but was able to fall back to sleep. Nursing will continue to monitor and encourage.

## 2025-06-01 NOTE — PROGRESS NOTES
Denice Wheat is a 44 y.o. female on day 4 of admission described her mood as quite depressed.  She remains remorseful and has a lot of depressive thoughts as a result of the suicide attempt.  She misses her family.  Since the lithium dose is extremely low at 150 mg with elemental dosage in the 35 mg range ibuprofen to help with her musculoskeletal pain as an anti-inflammatory agent should be safe.  Will do a lithium level tomorrow    Psychoeducation active listening and counseling provided.  Labs Reviewed.  Vitals Reviewed.   Notes Reviewed.   No EPS, TD, vitals stable.      MSE: Patient was alert, oriented to time, place, person and situation. Patient appears well groomed and clad in climate appropriate clothes. Patient is cooperative on approach. Recent and remote memory within normal limits. Memory registration and recall within normal limits. Attention and concentration within normal limits. Speech normal in rate, rhythm and volume. Good eye contact. Thought process Linear. Intact associations. Good fund of knowledge. Mood sad and affect constricted. Patient did not endorse any delusions. Patient denied any auditory visual hallucinations. Patient has denied any active suicidal  ideations and is not future oriented.  Patient has fair insight, fair judgment and good impulse control.      Musculoskeletal: Normal gait, no Parkinsonism, no Dystonia, no Akathisia, no TD. Psychomotor activity within normal limits.      Diagnosis: Bipolar 1 depressed     Assessment and Plan:      Continue current treatment  Patient is ready to go to inpatient rehab     Need for Inpatient Care: Medication titration, monitoring for side effects mood and impulsivity.  Plan for inpatient psychiatric care: Continue with psychiatric care in [facility].  Continue with current treatment and medication adjustments. Patient is agreeable with continued medication management and adjustments discussed.       Last Recorded Vitals  /69   Pulse 61    Temp 36.9 °C (98.4 °F) (Tympanic)   Resp 18   SpO2 96%      No results found for this or any previous visit (from the past 24 hours).     Current Medications[1]       Florin Villa MD         [1]   Current Facility-Administered Medications:     acetaminophen (Tylenol) tablet 650 mg, 650 mg, oral, q6h PRN, SANIA Church, 650 mg at 05/31/25 0642    alum-mag hydroxide-simeth (Mylanta) 200-200-20 mg/5 mL oral suspension 10 mL, 10 mL, oral, 4x daily PRN, SANIA Church    divalproex (Depakote) delayed release tablet 500 mg, 500 mg, oral, q12h COSME, SANIA Church, 500 mg at 06/01/25 0631    hydrOXYzine HCL (Atarax) tablet 50 mg, 50 mg, oral, q6h PRN, SANIA Church    ibuprofen tablet 600 mg, 600 mg, oral, q8h PRN, Florin Villa MD    lithium capsule 150 mg, 150 mg, oral, Nightly, Florin Villa MD, 150 mg at 05/31/25 2028    lurasidone (Latuda) tablet 40 mg, 40 mg, oral, Daily with evening meal, Florin Villa MD    OLANZapine (ZyPREXA) injection 5 mg, 5 mg, intramuscular, q6h PRN, SANIA Church    OLANZapine (ZyPREXA) tablet 5 mg, 5 mg, oral, q6h PRN, SANIA Church    pantoprazole (ProtoNix) EC tablet 40 mg, 40 mg, oral, Daily before breakfast, SANIA Church, 40 mg at 06/01/25 0836    prazosin (Minipress) capsule 2 mg, 2 mg, oral, q AM, Florin Villa MD, 2 mg at 06/01/25 0836    prazosin (Minipress) capsule 3 mg, 3 mg, oral, Nightly, Florin Villa MD    traMADol (Ultram) tablet 50 mg, 50 mg, oral, BID, Florin Villa MD, 50 mg at 06/01/25 0836
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
## 2025-06-02 LAB — TSH RECEP AB SER-ACNC: <1.1 IU/L

## 2025-06-02 PROCEDURE — 2500000001 HC RX 250 WO HCPCS SELF ADMINISTERED DRUGS (ALT 637 FOR MEDICARE OP): Performed by: PSYCHIATRY & NEUROLOGY

## 2025-06-02 PROCEDURE — 1240000001 HC SEMI-PRIVATE BH ROOM DAILY

## 2025-06-02 PROCEDURE — 97150 GROUP THERAPEUTIC PROCEDURES: CPT | Mod: GO

## 2025-06-02 PROCEDURE — 2500000001 HC RX 250 WO HCPCS SELF ADMINISTERED DRUGS (ALT 637 FOR MEDICARE OP): Performed by: REGISTERED NURSE

## 2025-06-02 PROCEDURE — 99232 SBSQ HOSP IP/OBS MODERATE 35: CPT | Performed by: PSYCHIATRY & NEUROLOGY

## 2025-06-02 RX ORDER — LURASIDONE HYDROCHLORIDE 40 MG/1
60 TABLET, FILM COATED ORAL
Status: DISCONTINUED | OUTPATIENT
Start: 2025-06-02 | End: 2025-06-07

## 2025-06-02 RX ORDER — PRAZOSIN HYDROCHLORIDE 1 MG/1
4 CAPSULE ORAL NIGHTLY
Status: DISCONTINUED | OUTPATIENT
Start: 2025-06-02 | End: 2025-06-04

## 2025-06-02 RX ADMIN — DIVALPROEX SODIUM 500 MG: 250 TABLET, DELAYED RELEASE ORAL at 06:02

## 2025-06-02 RX ADMIN — TRAMADOL HYDROCHLORIDE 50 MG: 50 TABLET, FILM COATED ORAL at 21:11

## 2025-06-02 RX ADMIN — PRAZOSIN HYDROCHLORIDE 4 MG: 1 CAPSULE ORAL at 21:11

## 2025-06-02 RX ADMIN — LITHIUM CARBONATE 150 MG: 150 CAPSULE, GELATIN COATED ORAL at 21:11

## 2025-06-02 RX ADMIN — HYDROXYZINE HYDROCHLORIDE 50 MG: 25 TABLET ORAL at 17:45

## 2025-06-02 RX ADMIN — LURASIDONE HYDROCHLORIDE 60 MG: 40 TABLET, FILM COATED ORAL at 17:34

## 2025-06-02 RX ADMIN — PRAZOSIN HYDROCHLORIDE 2 MG: 1 CAPSULE ORAL at 08:49

## 2025-06-02 RX ADMIN — TRAMADOL HYDROCHLORIDE 50 MG: 50 TABLET, FILM COATED ORAL at 08:49

## 2025-06-02 RX ADMIN — DIVALPROEX SODIUM 500 MG: 250 TABLET, DELAYED RELEASE ORAL at 17:34

## 2025-06-02 RX ADMIN — PANTOPRAZOLE SODIUM 40 MG: 40 TABLET, DELAYED RELEASE ORAL at 08:49

## 2025-06-02 ASSESSMENT — COLUMBIA-SUICIDE SEVERITY RATING SCALE - C-SSRS
6. HAVE YOU EVER DONE ANYTHING, STARTED TO DO ANYTHING, OR PREPARED TO DO ANYTHING TO END YOUR LIFE?: YES
2. HAVE YOU ACTUALLY HAD ANY THOUGHTS OF KILLING YOURSELF?: YES
6. HAVE YOU EVER DONE ANYTHING, STARTED TO DO ANYTHING, OR PREPARED TO DO ANYTHING TO END YOUR LIFE?: YES
1. IN THE PAST MONTH, HAVE YOU WISHED YOU WERE DEAD OR WISHED YOU COULD GO TO SLEEP AND NOT WAKE UP?: YES
6. HAVE YOU EVER DONE ANYTHING, STARTED TO DO ANYTHING, OR PREPARED TO DO ANYTHING TO END YOUR LIFE?: YES
4. HAVE YOU HAD THESE THOUGHTS AND HAD SOME INTENTION OF ACTING ON THEM?: NO
4. HAVE YOU HAD THESE THOUGHTS AND HAD SOME INTENTION OF ACTING ON THEM?: NO
6. HAVE YOU EVER DONE ANYTHING, STARTED TO DO ANYTHING, OR PREPARED TO DO ANYTHING TO END YOUR LIFE?: YES
2. HAVE YOU ACTUALLY HAD ANY THOUGHTS OF KILLING YOURSELF?: YES
5. HAVE YOU STARTED TO WORK OUT OR WORKED OUT THE DETAILS OF HOW TO KILL YOURSELF? DO YOU INTEND TO CARRY OUT THIS PLAN?: YES
1. IN THE PAST MONTH, HAVE YOU WISHED YOU WERE DEAD OR WISHED YOU COULD GO TO SLEEP AND NOT WAKE UP?: YES
5. HAVE YOU STARTED TO WORK OUT OR WORKED OUT THE DETAILS OF HOW TO KILL YOURSELF? DO YOU INTEND TO CARRY OUT THIS PLAN?: YES
6. HAVE YOU EVER DONE ANYTHING, STARTED TO DO ANYTHING, OR PREPARED TO DO ANYTHING TO END YOUR LIFE?: PATIENT DENIES ANY THOUGHTS OF SI AT THIS TIME

## 2025-06-02 ASSESSMENT — PAIN SCALES - GENERAL
PAINLEVEL_OUTOF10: 4
PAINLEVEL_OUTOF10: 0 - NO PAIN
PAINLEVEL_OUTOF10: 6

## 2025-06-02 ASSESSMENT — PAIN - FUNCTIONAL ASSESSMENT
PAIN_FUNCTIONAL_ASSESSMENT: 0-10

## 2025-06-02 ASSESSMENT — PAIN DESCRIPTION - LOCATION: LOCATION: GENERALIZED

## 2025-06-02 NOTE — CARE PLAN
The patient's goals for the shift include hope nightmares better tonight, groups    The clinical goals for the shift include attend groups, be out of room 4plus hours    Over the shift, the patient did not make progress toward the following goals. Barriers to progression include clinical presentation . Recommendations to address these barriers include med compliance.      Pt states and axiety is 3/10 depression is 6/10  Cruzito HOLDEN   Denies SI/HI    Spent evening on the phone and reading in bed.
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
## 2025-06-02 NOTE — PROGRESS NOTES
Denice Wheat is a 44 y.o. female on day 5 of admission presenting with bipolar disorder.    Subjective   Patient mood improving but she is still experiencing night terrors at night.  Will increase prazosin to 4 mg oral daily at bedtime.  Patient vitals stable, encouraged to drink plenty of water.  Patient working with substance use navigator.  Speaking with Bryon David for possible residential [facility] treatment once discharged from the hospital.  Patient has been active on the unit, participating in group therapy.    Objective     MSE  General: Appropriately groomed and dressed.  Appearance: Appears stated age.  Attitude: Calm, cooperative.  Behavior: Appropriate eye contact.  Motor activity: No agitation or retardation. no EPS.  Normal gait.  Speech: Regular rate, rhythm, volume and tone.  Mood: Less depressed  Affect: Appropriate range  Thought process: Organized, linear, goal-directed.  Associations are logical.  Thought content: Does not endorse suicidal or homicidal ideation, no delusions elicited.  Thought perception: Did not endorse auditory or visual hallucinations.  Cognition: Alert, oriented x3.  no deficit in memory or attention.  Insight: Fair.  Judgment: Fair.    Last Recorded Vitals  /67 (BP Location: Right arm, Patient Position: Sitting)   Pulse 64   Temp 36.8 °C (98.2 °F) (Temporal)   Resp 17   SpO2 93%      Relevant Results  Scheduled medications  Scheduled Medications[1]  Continuous medications  Continuous Medications[2]  PRN medications  PRN Medications[3]    No results found for this or any previous visit (from the past 24 hours).     Assessment/Plan   Diagnosis:  Bipolar depression    Impression:     Labs and Chart: reviewed  Case discussed with treatment team members  Encouraged patient to attend group and other mileu activity  Collateral from family - pending  Discharge planing  Medication:       - Reviewed. To continue as ordered    Medication Consent  Medication Consent: risks,  benefits, side effects reviewed for all ordered meds and patient expressed understanding and consent obtained    Timothy Rod, APRN-CNP          [1] divalproex, 500 mg, oral, q12h COSEM  lithium, 150 mg, oral, Nightly  lurasidone, 60 mg, oral, Daily with evening meal  pantoprazole, 40 mg, oral, Daily before breakfast  prazosin, 2 mg, oral, q AM  prazosin, 4 mg, oral, Nightly  traMADol, 50 mg, oral, BID  [2]    [3] PRN medications: acetaminophen, alum-mag hydroxide-simeth, hydrOXYzine HCL, ibuprofen, OLANZapine, OLANZapine

## 2025-06-02 NOTE — PROGRESS NOTES
Endocrinology Progress Note  Patient: Denice Wheat  Unit/Bed: 562/562-B  YOB: 1980  MRN: [id number]  Acct: [id number]   Admitting Diagnosis: Bipolar depression (Multi) [F31.9]  Date:  5/28/2025  Hospital Day: 5  Attending: Florin Villa MD       Complaint:  No chief complaint on file.         Assessment     Problem List[1]       Plan:  Thyroid nodules--ultrasound done fairly small and can have follow-up with ultrasound done in about a year  They do not appear to be the cause of dysphagia as they are less than 1 cm    Low TSH which is minimally borderline low   -with normal free T4 and normal free T3  Thyroid stimulating immunoglobulin negative goes against Graves' disease  Patient does not need any methimazole and her symptoms are likely not related to hyperthyroidism    The patient may be started on lithium if necessary and will have to be followed every 3 months or so with thyroid labs to make sure the stable          SUBJECTIVE    Awake alert looks good no major distress  Minimal swelling sometimes not on regular basis  Labs reviewed with the patient in detail also thyroid ultrasound reviewed with the patient in detail        VITALS      Vitals:    05/31/25 0719 06/01/25 0630 06/01/25 1837 06/02/25 0739   BP: 113/74 112/69 115/78 121/67   BP Location:    Right arm   Patient Position:    Sitting   Pulse: 71 61 67 64   Resp: 16 18  17   Temp: 36.3 °C (97.3 °F) 36.9 °C (98.4 °F) 36.8 °C (98.2 °F) 36.8 °C (98.2 °F)   TempSrc:  Tympanic  Temporal   SpO2: 92% 96% 97% 93%     No intake or output data in the 24 hours ending 06/02/25 1055   Wt Readings from Last 4 Encounters:   05/27/25 90.7 kg (200 lb)        Allergies:  RX Allergies[2]     PHYSICAL EXAM   Physical Exam  Awake alert sitting up walking easily  Conversation  No distress  No major thyromegaly tenderness in the neck or lymphadenopathy    LABS   Magnesium:    Lipid Panel:  Results from last 7 days   Lab Units 05/29/25  0726   HDL mg/dL  75.8   CHOLESTEROL/HDL RATIO  2.7   VLDL mg/dL 28   TRIGLYCERIDES mg/dL 142   NON HDL CHOL. mg/dL 131      Lab Review  Lab Results   Component Value Date    BILITOT 0.3 05/27/2025    CALCIUM 9.4 05/27/2025    CO2 19 (L) 05/27/2025     05/27/2025    CREATININE 0.77 05/27/2025    GLUCOSE 116 (H) 05/27/2025    ALKPHOS 44 05/27/2025    K 4.1 05/27/2025    PROT 7.3 05/27/2025     05/27/2025    AST 15 05/27/2025    ALT 10 05/27/2025    BUN 12 05/27/2025    ANIONGAP 18 05/27/2025    ALBUMIN 4.7 05/27/2025     Lab Results   Component Value Date    TRIG 142 05/29/2025    CHOL 207 (H) 05/29/2025    LDLCALC 103 (H) 05/29/2025    HDL 75.8 05/29/2025     Lab Results   Component Value Date    HGBA1C 5.1 05/29/2025     The 10-year ASCVD risk score (Lorenzo LOPEZ, et al., 2019) is: 0.4%    Values used to calculate the score:      Age: 44 years      Sex: Female      Is Non- : No      Diabetic: No      Tobacco smoker: No      Systolic Blood Pressure: 121 mmHg      Is BP treated: No      HDL Cholesterol: 75.8 mg/dL      Total Cholesterol: 207 mg/dL   Lab Results   Component Value Date    HGBA1C 5.1 05/29/2025     05/27/2025    K 4.1 05/27/2025     05/27/2025    CO2 19 (L) 05/27/2025    BUN 12 05/27/2025    CREATININE 0.77 05/27/2025    CALCIUM 9.4 05/27/2025    ALBUMIN 4.7 05/27/2025    PROT 7.3 05/27/2025    BILITOT 0.3 05/27/2025    ALKPHOS 44 05/27/2025    ALT 10 05/27/2025    AST 15 05/27/2025    GLUCOSE 116 (H) 05/27/2025    CHOL 207 (H) 05/29/2025    TRIG 142 05/29/2025    HDL 75.8 05/29/2025        Scheduled Medications[3]         Electronically signed by Gustavo Anderson MD on 6/2/2025 at 10:55 AM           [1]   Patient Active Problem List  Diagnosis    Bipolar depression (Multi)   [2]   Allergies  Allergen Reactions    Aspirin Unknown     Medication contraindication per pt   [3] divalproex, 500 mg, oral, q12h COSME  lithium, 150 mg, oral, Nightly  lurasidone, 60 mg, oral, Daily with  evening meal  pantoprazole, 40 mg, oral, Daily before breakfast  prazosin, 2 mg, oral, q AM  prazosin, 4 mg, oral, Nightly  traMADol, 50 mg, oral, BID
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
## 2025-06-02 NOTE — PROGRESS NOTES
Occupational Therapy     REHAB Therapy Assessment & Treatment    Patient Name: Denice Wheat  MRN: [id number]  Today's Date: 6/2/2025      Activity:  Untangle Thinking Group    Attendance:  Attendance  Activity: Discussion/reminisce  Participation: Active participation    Treatment Approach  Approach : Group therapy sessions  Patient Stated Goals: none stated  Cognition: Attention, Directions (Pt alert, oriented, & attentive. Follows complex directions independently.)  Social Skills: Demonstrates ability to listen to others, Interacts with others with cues  Emotional: Mood (Pt mood calm, tired; affect constricted)  Stress Management/Relaxation Training: Identifies benefits of stress management/relaxation techniques, Identifies difficulty adjusting to illness, hospitalization, or pain  Treatment Approach Comments: Pt attended & participated in afternoon therapy group focused on “Untangling Your Thinking” through identifying cognitive distortions & challenging distorted thoughts. First, pt educated on cognitive distortions, including black-and-white thinking, catastrophizing, & fortune telling. Next, pt educated on how to develop more positive thinking by disputing & de-escalating irrational thoughts. Pt identified & shared that the distortions they struggle with the most include emotional reasoning. Pt identified cognitive distortions present in example statements w/ good accuracy, identifying distortions including black and white thinking. Then, pt practiced learned skills w/ their own irrational thinking patterns. Pt identified irrational thoughts that cause them distress, identified cognitive distortions present in each, & practiced disputing & de-escalating to arrive at a more rational thought. Pt demonstrated good understanding through practice activity.      Encounter Problems       Encounter Problems (Active)       OT Goals       OT Goal 1 (Progressing)       Start:  05/29/25    Expected End:  06/26/25        Communication/Interaction Skills (Self-expression/social Behavior/assertive)  Explore/demonstrate 1-2 effective methods of expressing feelings/wants/needs (can include assertion/engaging/sharing/asking) prior to discharge          OT Goal 2 (Progressing)       Start:  05/29/25    Expected End:  06/26/25       Coping Skills  Explore/identify 1-2 effective strategies of expressing feelings, wants, needs(can include assertion, engaging, sharing, asking) prior to discharge          OT Goal 3 (Progressing)       Start:  05/29/25    Expected End:  06/26/25        Emotion Regulation/self control  Pt will exhibit appropriate range, regulation of emotions, impulse control, frustration tolerance in OT groups prior to discharge.                Education:  Pt given educational handouts on “Untangling Thinking” by challenging irrational thoughts. Reviewed, discussed, & practiced. Pt demonstrates good understanding.

## 2025-06-02 NOTE — PROGRESS NOTES
[facility] shared with pt that they got accepted to [facility]. ALAN informed pt that pt discharge is looking like Friday 6/6. ALAN informed [facility] that they can send updated notes on Thursday.     OMAR Villatoro

## 2025-06-02 NOTE — NURSING NOTE
Pt tearful, states that she had a bad day, thinking of how she ended up here, that this happened and groups were hard today. Talked with pt and reassured her and pt did decide to take atarax.
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
## 2025-06-03 PROCEDURE — 97150 GROUP THERAPEUTIC PROCEDURES: CPT | Mod: GO

## 2025-06-03 PROCEDURE — 99232 SBSQ HOSP IP/OBS MODERATE 35: CPT | Performed by: PSYCHIATRY & NEUROLOGY

## 2025-06-03 PROCEDURE — 1240000001 HC SEMI-PRIVATE BH ROOM DAILY

## 2025-06-03 PROCEDURE — 2500000001 HC RX 250 WO HCPCS SELF ADMINISTERED DRUGS (ALT 637 FOR MEDICARE OP): Performed by: REGISTERED NURSE

## 2025-06-03 PROCEDURE — 2500000001 HC RX 250 WO HCPCS SELF ADMINISTERED DRUGS (ALT 637 FOR MEDICARE OP): Performed by: PSYCHIATRY & NEUROLOGY

## 2025-06-03 RX ADMIN — PANTOPRAZOLE SODIUM 40 MG: 40 TABLET, DELAYED RELEASE ORAL at 10:57

## 2025-06-03 RX ADMIN — TRAMADOL HYDROCHLORIDE 50 MG: 50 TABLET, FILM COATED ORAL at 10:57

## 2025-06-03 RX ADMIN — DIVALPROEX SODIUM 500 MG: 250 TABLET, DELAYED RELEASE ORAL at 06:28

## 2025-06-03 RX ADMIN — LITHIUM CARBONATE 150 MG: 150 CAPSULE, GELATIN COATED ORAL at 20:17

## 2025-06-03 RX ADMIN — DIVALPROEX SODIUM 500 MG: 250 TABLET, DELAYED RELEASE ORAL at 18:14

## 2025-06-03 RX ADMIN — PRAZOSIN HYDROCHLORIDE 4 MG: 1 CAPSULE ORAL at 20:17

## 2025-06-03 RX ADMIN — TRAMADOL HYDROCHLORIDE 50 MG: 50 TABLET, FILM COATED ORAL at 20:17

## 2025-06-03 RX ADMIN — PRAZOSIN HYDROCHLORIDE 2 MG: 1 CAPSULE ORAL at 10:57

## 2025-06-03 RX ADMIN — LURASIDONE HYDROCHLORIDE 60 MG: 40 TABLET, FILM COATED ORAL at 17:12

## 2025-06-03 ASSESSMENT — COLUMBIA-SUICIDE SEVERITY RATING SCALE - C-SSRS
4. HAVE YOU HAD THESE THOUGHTS AND HAD SOME INTENTION OF ACTING ON THEM?: NO
6. HAVE YOU EVER DONE ANYTHING, STARTED TO DO ANYTHING, OR PREPARED TO DO ANYTHING TO END YOUR LIFE?: PT DENIES SI
1. IN THE PAST MONTH, HAVE YOU WISHED YOU WERE DEAD OR WISHED YOU COULD GO TO SLEEP AND NOT WAKE UP?: YES
6. HAVE YOU EVER DONE ANYTHING, STARTED TO DO ANYTHING, OR PREPARED TO DO ANYTHING TO END YOUR LIFE?: YES
4. HAVE YOU HAD THESE THOUGHTS AND HAD SOME INTENTION OF ACTING ON THEM?: NO
6. HAVE YOU EVER DONE ANYTHING, STARTED TO DO ANYTHING, OR PREPARED TO DO ANYTHING TO END YOUR LIFE?: PT DENIES SI AT THIS TIME.
1. IN THE PAST MONTH, HAVE YOU WISHED YOU WERE DEAD OR WISHED YOU COULD GO TO SLEEP AND NOT WAKE UP?: YES
6. HAVE YOU EVER DONE ANYTHING, STARTED TO DO ANYTHING, OR PREPARED TO DO ANYTHING TO END YOUR LIFE?: YES
5. HAVE YOU STARTED TO WORK OUT OR WORKED OUT THE DETAILS OF HOW TO KILL YOURSELF? DO YOU INTEND TO CARRY OUT THIS PLAN?: YES
2. HAVE YOU ACTUALLY HAD ANY THOUGHTS OF KILLING YOURSELF?: YES
5. HAVE YOU STARTED TO WORK OUT OR WORKED OUT THE DETAILS OF HOW TO KILL YOURSELF? DO YOU INTEND TO CARRY OUT THIS PLAN?: YES
2. HAVE YOU ACTUALLY HAD ANY THOUGHTS OF KILLING YOURSELF?: YES

## 2025-06-03 ASSESSMENT — PAIN SCALES - GENERAL
PAINLEVEL_OUTOF10: 0 - NO PAIN
PAINLEVEL_OUTOF10: 0 - NO PAIN

## 2025-06-03 ASSESSMENT — PAIN - FUNCTIONAL ASSESSMENT
PAIN_FUNCTIONAL_ASSESSMENT: 0-10
PAIN_FUNCTIONAL_ASSESSMENT: 0-10

## 2025-06-03 NOTE — PROGRESS NOTES
"Occupational Therapy     REHAB Therapy Assessment & Treatment    Patient Name: Denice Wheat  MRN: [id number]  Today's Date: 6/3/2025      Activity:  Self-Esteem Group    Attendance:  Attendance  Activity: Discussion/reminisce  Participation: Active participation    Treatment Approach  Approach : Group therapy sessions  Patient Stated Goals: none stated  Cognition: Attention, Directions (Pt alert, oriented, & attentive. Follows complex directions independently.)  Social Skills: Demonstrates ability to listen to others, Interacts with others with cues  Emotional: Mood (Pt mood somewhat anxious, affect constricted but improving, tearful briefly)  Stress Management/Relaxation Training: Identifies benefits of stress management/relaxation techniques, Identifies difficulty adjusting to illness, hospitalization, or pain  Treatment Approach Comments: Pt attended & participated in morning therapy group focused on self-esteem. Pt educated on concept of self-esteem. Pt participated in discussion about ways that positive vs. negative self-esteem can impact participation in meaningful roles & occupations. Then, pt educated on tips for improving self-esteem, including spending time w/ oneself, positive self-talk, emulating role models, & gratitude. Next, pt participated in practice of 2 strategies aimed at increasing self-esteem. During “Core Beliefs” exercise, pt able to identify one of their negative core beliefs as, \"I'm worthless\" & to challenge the core belief using fact-checking statements, including, \"I have raised 3 wonderful children\". During “Strengths Use Plan” exercise, pt able to identify personal strengths, including forgiveness. Then, pt thought of examples of times they have demonstrated those strengths. Pt shared she forgave her father before he passed. Pt tearful at end of group, said it's a lot of keep thinking about things that happened prior to her admission. She planned to read her book & write letters to " her kids to help decompress. Pt demonstrates good understanding & motivation to apply skills.      Encounter Problems       Encounter Problems (Active)       OT Goals       OT Goal 1 (Progressing)       Start:  05/29/25    Expected End:  06/26/25       Communication/Interaction Skills (Self-expression/social Behavior/assertive)  Explore/demonstrate 1-2 effective methods of expressing feelings/wants/needs (can include assertion/engaging/sharing/asking) prior to discharge          OT Goal 2 (Progressing)       Start:  05/29/25    Expected End:  06/26/25       Coping Skills  Explore/identify 1-2 effective strategies of expressing feelings, wants, needs(can include assertion, engaging, sharing, asking) prior to discharge          OT Goal 3 (Progressing)       Start:  05/29/25    Expected End:  06/26/25        Emotion Regulation/self control  Pt will exhibit appropriate range, regulation of emotions, impulse control, frustration tolerance in OT groups prior to discharge.                Education:  Pt given educational handouts on self-esteem, core beliefs, & strengths. Reviewed, discussed, & practiced. Pt demonstrates good understanding.
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
## 2025-06-03 NOTE — PROGRESS NOTES
"Occupational Therapy     REHAB Therapy Assessment & Treatment    Patient Name: Denice Wheat  MRN: [id number]  Today's Date: 6/3/2025      Activity:  Mindfulness Group    Attendance:  Attendance  Activity: Discussion/reminisce, Relaxation therapy  Participation: Active participation    Treatment Approach  Approach : Group therapy sessions  Patient Stated Goals: none stated  Cognition: Attention, Directions (Pt alert, oriented, & attentive. Follows simple directions independently.)  Social Skills: Demonstrates ability to listen to others, Interacts independently in social activity  Emotional: Mood (Pt mood calm, affect constricted but improving)  Stress Management/Relaxation Training: Identifies benefits of stress management/relaxation techniques, Identifies difficulty adjusting to illness, hospitalization, or pain  Treatment Approach Comments: Pt attended & participated in afternoon therapy group focused on mindfulness. Pt educated on mindfulness, including definition, benefits of regular mindfulness practice (including reduced anxiety & depression, improved emotion regulation, improved memory & focus), & ways to practice (including meditation, body scans, mindfulness walks, & mindful activities). Pt participated in guided practice of 3 different 5-10 minute meditations, including standard breathing meditation, body scan, & “Leaves on a Stream” which utilizes visual imagery. Pt participated in discussion of each practice. Pt shared finding all relaxing, but especially the \"Leaves on a Stream\" meditation, stating, \"It felt like heaven because I pictured all my family there with me & just got to release the negative thoughts.\" Pt also educated on specific mindfulness-based strategies for reducing harmful coping behaviors, including “Urge Surfing” & “Delay, Distract, Decide.” Pt demonstrates good understanding & motivation to apply skills.      Encounter Problems       Encounter Problems (Active)       OT Goals       " OT Goal 1 (Progressing)       Start:  05/29/25    Expected End:  06/26/25       Communication/Interaction Skills (Self-expression/social Behavior/assertive)  Explore/demonstrate 1-2 effective methods of expressing feelings/wants/needs (can include assertion/engaging/sharing/asking) prior to discharge          OT Goal 2 (Progressing)       Start:  05/29/25    Expected End:  06/26/25       Coping Skills  Explore/identify 1-2 effective strategies of expressing feelings, wants, needs(can include assertion, engaging, sharing, asking) prior to discharge          OT Goal 3 (Progressing)       Start:  05/29/25    Expected End:  06/26/25        Emotion Regulation/self control  Pt will exhibit appropriate range, regulation of emotions, impulse control, frustration tolerance in OT groups prior to discharge.                Education:  Pt given educational handouts on mindfulness & mindfulness practices. Reviewed, discussed, & practiced. Pt demonstrates good understanding.

## 2025-06-03 NOTE — CARE PLAN
The patient's goals for the shift include sleep    The clinical goals for the shift include patient will sleep at least 6 hours    Over the shift, the patient did make progress toward the following goals. Patient was cooperative throughout the shift. She was isolated to her room the entire shift. She was noted to be more depressed this evening but brightened up a little after talking to her daughter on the phone. She denies any SI, HI, or hallucinations. She was compliant with her medication and denied any need for PRN's. She slept on and off throughout the night. Nursing will continue to monitor and encourage.

## 2025-06-04 LAB — THYROID STIMULATING IMMUNOGLOBULIN: 115 % BASELINE

## 2025-06-04 PROCEDURE — 2500000001 HC RX 250 WO HCPCS SELF ADMINISTERED DRUGS (ALT 637 FOR MEDICARE OP): Performed by: REGISTERED NURSE

## 2025-06-04 PROCEDURE — 99232 SBSQ HOSP IP/OBS MODERATE 35: CPT | Performed by: PSYCHIATRY & NEUROLOGY

## 2025-06-04 PROCEDURE — 2500000001 HC RX 250 WO HCPCS SELF ADMINISTERED DRUGS (ALT 637 FOR MEDICARE OP): Performed by: PSYCHIATRY & NEUROLOGY

## 2025-06-04 PROCEDURE — 97150 GROUP THERAPEUTIC PROCEDURES: CPT | Mod: GO

## 2025-06-04 PROCEDURE — 1240000001 HC SEMI-PRIVATE BH ROOM DAILY

## 2025-06-04 RX ORDER — PRAZOSIN HYDROCHLORIDE 1 MG/1
5 CAPSULE ORAL NIGHTLY
Status: DISCONTINUED | OUTPATIENT
Start: 2025-06-04 | End: 2025-06-08

## 2025-06-04 RX ADMIN — TRAMADOL HYDROCHLORIDE 50 MG: 50 TABLET, FILM COATED ORAL at 08:08

## 2025-06-04 RX ADMIN — DIVALPROEX SODIUM 500 MG: 250 TABLET, DELAYED RELEASE ORAL at 06:10

## 2025-06-04 RX ADMIN — TRAMADOL HYDROCHLORIDE 50 MG: 50 TABLET, FILM COATED ORAL at 20:32

## 2025-06-04 RX ADMIN — LURASIDONE HYDROCHLORIDE 60 MG: 40 TABLET, FILM COATED ORAL at 17:22

## 2025-06-04 RX ADMIN — PRAZOSIN HYDROCHLORIDE 5 MG: 1 CAPSULE ORAL at 20:32

## 2025-06-04 RX ADMIN — DIVALPROEX SODIUM 500 MG: 250 TABLET, DELAYED RELEASE ORAL at 18:39

## 2025-06-04 RX ADMIN — PANTOPRAZOLE SODIUM 40 MG: 40 TABLET, DELAYED RELEASE ORAL at 06:10

## 2025-06-04 RX ADMIN — LITHIUM CARBONATE 150 MG: 150 CAPSULE, GELATIN COATED ORAL at 20:32

## 2025-06-04 RX ADMIN — PRAZOSIN HYDROCHLORIDE 2 MG: 1 CAPSULE ORAL at 08:08

## 2025-06-04 ASSESSMENT — COLUMBIA-SUICIDE SEVERITY RATING SCALE - C-SSRS
6. HAVE YOU EVER DONE ANYTHING, STARTED TO DO ANYTHING, OR PREPARED TO DO ANYTHING TO END YOUR LIFE?: DENIES SI
4. HAVE YOU HAD THESE THOUGHTS AND HAD SOME INTENTION OF ACTING ON THEM?: NO
6. HAVE YOU EVER DONE ANYTHING, STARTED TO DO ANYTHING, OR PREPARED TO DO ANYTHING TO END YOUR LIFE?: DENIES SI
5. HAVE YOU STARTED TO WORK OUT OR WORKED OUT THE DETAILS OF HOW TO KILL YOURSELF? DO YOU INTEND TO CARRY OUT THIS PLAN?: YES
6. HAVE YOU EVER DONE ANYTHING, STARTED TO DO ANYTHING, OR PREPARED TO DO ANYTHING TO END YOUR LIFE?: YES
1. IN THE PAST MONTH, HAVE YOU WISHED YOU WERE DEAD OR WISHED YOU COULD GO TO SLEEP AND NOT WAKE UP?: YES
4. HAVE YOU HAD THESE THOUGHTS AND HAD SOME INTENTION OF ACTING ON THEM?: NO
2. HAVE YOU ACTUALLY HAD ANY THOUGHTS OF KILLING YOURSELF?: YES
6. HAVE YOU EVER DONE ANYTHING, STARTED TO DO ANYTHING, OR PREPARED TO DO ANYTHING TO END YOUR LIFE?: YES
2. HAVE YOU ACTUALLY HAD ANY THOUGHTS OF KILLING YOURSELF?: YES
5. HAVE YOU STARTED TO WORK OUT OR WORKED OUT THE DETAILS OF HOW TO KILL YOURSELF? DO YOU INTEND TO CARRY OUT THIS PLAN?: YES
1. IN THE PAST MONTH, HAVE YOU WISHED YOU WERE DEAD OR WISHED YOU COULD GO TO SLEEP AND NOT WAKE UP?: YES

## 2025-06-04 ASSESSMENT — PAIN SCALES - GENERAL
PAINLEVEL_OUTOF10: 0 - NO PAIN
PAINLEVEL_OUTOF10: 0 - NO PAIN

## 2025-06-04 ASSESSMENT — PAIN - FUNCTIONAL ASSESSMENT
PAIN_FUNCTIONAL_ASSESSMENT: 0-10
PAIN_FUNCTIONAL_ASSESSMENT: 0-10

## 2025-06-04 NOTE — PROGRESS NOTES
Occupational Therapy     REHAB Therapy Assessment & Treatment    Patient Name: Denice Wheat  MRN: [id number]  Today's Date: 6/4/2025      Activity:  Self-Compassion Group    Attendance:  Attendance  Activity: Discussion/reminisce  Participation: Active participation    Treatment Approach  Approach : Group therapy sessions  Patient Stated Goals: none stated  Cognition: Attention, Directions (Pt alert, oriented, & attentive. Follows complex directions independently.)  Social Skills: Demonstrates ability to listen to others, Interacts with others with cues  Emotional: Mood (Pt mood calm, affect constricted but improving overall, tearful while completing Compassionate Thought Diary)  Stress Management/Relaxation Training: Identifies benefits of stress management/relaxation techniques, Identifies difficulty adjusting to illness, hospitalization, or pain  Treatment Approach Comments: Pt attended & participated in morning therapy group focused on self-compassion. Pt educated on self-compassion & why it is important to demonstrate compassion toward ourselves. Pt participated in discussion about self-compassion vs. self-criticism. Pt educated on how self-criticism perpetuates the fight-or-flight response & prevents nervous system regulation. Then, pt completed a self-assessment identifying self-critical thoughts & habits. Pt identified multiple self-critical habits she has. Finally, pt educated on using a “Compassionate Thought Diary” to challenge self-critical thoughts in response to example scenarios. Pt walked through example, then asked to complete Compassionate Thought Diary for themselves. Pt completed, using the following thought/situation: her guilt/shame around the events leading to her hospitalization. Pt successfully responded to questions on handout encouraging self-compassion. After completing this activity, pt participated in discussion of how the experience felt, stating it was helpful though she recognized  that at the end, her belief in the self-critical thoughts had only decreased a little. Encouraged pt that any decrease was positive & that these strategies become more effective w/ practice. Pt receptive. Pt tearful doing thought diary activity, receptive to support. Pt encouraged to use questions on thought diary to challenge self-critical tendencies in the future. Pt demonstrates good understanding.      Encounter Problems       Encounter Problems (Active)       OT Goals       OT Goal 1 (Progressing)       Start:  05/29/25    Expected End:  06/26/25       Communication/Interaction Skills (Self-expression/social Behavior/assertive)  Explore/demonstrate 1-2 effective methods of expressing feelings/wants/needs (can include assertion/engaging/sharing/asking) prior to discharge          OT Goal 2 (Progressing)       Start:  05/29/25    Expected End:  06/26/25       Coping Skills  Explore/identify 1-2 effective strategies of expressing feelings, wants, needs(can include assertion, engaging, sharing, asking) prior to discharge          OT Goal 3 (Progressing)       Start:  05/29/25    Expected End:  06/26/25        Emotion Regulation/self control  Pt will exhibit appropriate range, regulation of emotions, impulse control, frustration tolerance in OT groups prior to discharge.                Education:  Pt given educational handouts on self-compassion, including Compassionate Thought Diary. Reviewed, discussed, & practiced. Pt demonstrates good understanding.

## 2025-06-04 NOTE — CARE PLAN
Problem: Pain - Adult  Goal: Verbalizes/displays adequate comfort level or baseline comfort level  Outcome: Progressing     Problem: Infection - Adult  Goal: Absence of infection at discharge  Outcome: Progressing  Goal: Absence of infection during hospitalization  Outcome: Progressing  Goal: Absence of fever/infection during anticipated neutropenic period  Outcome: Progressing     Problem: Safety - Adult  Goal: Free from fall injury  Outcome: Progressing     Problem: Discharge Planning  Goal: Discharge to home or other facility with appropriate resources  Outcome: Progressing     Problem: Chronic Conditions and Co-morbidities  Goal: Patient's chronic conditions and co-morbidity symptoms are monitored and maintained or improved  Outcome: Progressing     Problem: Nutrition  Goal: Nutrient intake appropriate for maintaining nutritional needs  Outcome: Progressing     Problem: Altered Thought Processes as Evidenced by  Goal: STG - Desires improvement in ability to think and concentrate  Outcome: Progressing  Goal: STG - Participates in Occupational Therapy and other cognitive assessments  Outcome: Progressing     Problem: Potential for Harm to Self or Others  Goal: Cooperates with admission process  Outcome: Progressing  Goal: Participates in unit activities  Outcome: Progressing  Goal: Patient/Family participate in treatment and discharge plans  Outcome: Progressing  Goal: Identifies deescalation techniques  Outcome: Progressing  Goal: Understands least restrictive measures  Outcome: Progressing  Goal: Identifies stressors that lead to harmful behaviors  Outcome: Progressing  Goal: Notifies staff when experiencing harmful thoughts toward self/others  Outcome: Progressing  Goal: Denies harm toward self or others  Outcome: Progressing  Goal: Free from restraint events  Outcome: Progressing     Problem: Educational/Scholastic Disruption  Goal: Meets educational requirements during hospitalization  Outcome:  Progressing  Goal: Attends class without disruptive behavior  Outcome: Progressing  Goal: Completes daily assignments  Outcome: Progressing     Problem: Ineffective Coping  Goal: Identifies ineffective coping skills  Outcome: Progressing  Goal: Identifies healthy coping skills  Outcome: Progressing  Goal: Demonstrates healthy coping skills  Outcome: Progressing  Goal: Participates in unit activities  Outcome: Progressing  Goal: Patient/Family participate in treatment and discharge plans  Outcome: Progressing  Goal: Patient/Family verbalizes awareness of resources  Outcome: Progressing  Goal: Understands least restrictive measures  Outcome: Progressing  Goal: Free from restraint events  Outcome: Progressing     Problem: Alteration in Sleep  Goal: STG - Reports nightly sleep, duration, and quality  Outcome: Progressing  Goal: STG - Identifies sleep hygiene aids  Outcome: Progressing  Goal: STG - Informs staff if unable to sleep  Outcome: Progressing  Goal: STG - Attends breathing and relaxation group  Outcome: Progressing     Problem: Potential for Substance Withdrawal  Goal: Verbalizes signs/symptoms of withdrawal  Outcome: Progressing  Goal: Reports signs/symptoms of withdrawal  Outcome: Progressing  Goal: Free of withdrawal symptoms  Outcome: Progressing     Problem: Anxiety  Goal: Patient/family understands admission protocols  Outcome: Progressing  Goal: Attempts to manage anxiety with help  Outcome: Progressing  Goal: Verbalizes ways to manage anxiety  Outcome: Progressing  Goal: Implements measures to reduce anxiety  Outcome: Progressing  Goal: Free from restraint events  Outcome: Progressing     Problem: Self Care Deficit  Goal: STG - Patient completes hygiene  Outcome: Progressing  Goal: Increase group attendance  Outcome: Progressing  Goal: Accepts need for medications  Outcome: Progressing  Goal: STG - Goes to and eats meals independently in dining room 100% of time  Outcome: Progressing     Problem:  Defensive Coping  Goal: Identifies reckless/dangerous behavior  Outcome: Progressing  Goal: Identifies stressors that lead to reckless/dangerous behavior  Outcome: Progressing  Goal: Discusses and identifies healthy coping skills  Outcome: Progressing  Goal: Demonstrates healthy coping skills  Outcome: Progressing  Goal: Identifies appropriate social interaction  Outcome: Progressing  Goal: Demonstrates appropriate social interactions  Outcome: Progressing  Goal: Patient/Family verbalizes awareness of resources  Outcome: Progressing  Goal: Discusses signs/symptoms of illness/treatment options  Outcome: Progressing  Goal: Patient/Family participate in treatment and discharge plans  Outcome: Progressing  Goal: Understands least restrictive measures  Outcome: Progressing  Goal: Free from restraint events  Outcome: Progressing     Problem: Risk for Suicide  Goal: Accepts medications as prescribed/needed this shift  Outcome: Progressing  Goal: Identifies supports this shift  Outcome: Progressing  Goal: Makes needs known through verbalization or behaviors this shift  Outcome: Progressing  Goal: No self harm this shift  Outcome: Progressing  Goal: Read Safety Guidelines this shift  Outcome: Progressing  Goal: Complete Mental Health Safety Plan (psychiatry only) this shift  Outcome: Progressing   The patient's goals for the shift include sleep    The clinical goals for the shift include sleep    Pt has isolate to her room and rested in her bed. Pt currently denies suicidal and homicidal ideation and denies auditory and visual hallucinations. Pt affect is flat and mood is anxious and depressed and pt has been calm and cooperative and pt took bed time meds. Will continue to monitor.

## 2025-06-04 NOTE — CARE PLAN
The patient's goals for the shift include Patient states goal is to read a book and make it to group.    The clinical goals for the shift include Patient will actively participate in treatment plan.    Patient denies SI, HI, A/VH. Patient reports improved mood and less vivid dreams last night. Patient observed to be out on unit this shift interacting with peers and staff. Patient compliant with scheduled medication. Patient able to make needs known. Care ongoing.        Problem: Pain - Adult  Goal: Verbalizes/displays adequate comfort level or baseline comfort level  Outcome: Progressing     Problem: Infection - Adult  Goal: Absence of infection at discharge  Outcome: Progressing  Goal: Absence of infection during hospitalization  Outcome: Progressing  Goal: Absence of fever/infection during anticipated neutropenic period  Outcome: Progressing     Problem: Safety - Adult  Goal: Free from fall injury  Outcome: Progressing     Problem: Discharge Planning  Goal: Discharge to home or other facility with appropriate resources  Outcome: Progressing     Problem: Chronic Conditions and Co-morbidities  Goal: Patient's chronic conditions and co-morbidity symptoms are monitored and maintained or improved  Outcome: Progressing     Problem: Nutrition  Goal: Nutrient intake appropriate for maintaining nutritional needs  Outcome: Progressing     Problem: Altered Thought Processes as Evidenced by  Goal: STG - Desires improvement in ability to think and concentrate  Outcome: Progressing  Goal: STG - Participates in Occupational Therapy and other cognitive assessments  Outcome: Progressing     Problem: Potential for Harm to Self or Others  Goal: Cooperates with admission process  Outcome: Progressing  Goal: Participates in unit activities  Outcome: Progressing  Goal: Patient/Family participate in treatment and discharge plans  Outcome: Progressing  Goal: Identifies deescalation techniques  Outcome: Progressing  Goal: Understands  least restrictive measures  Outcome: Progressing  Goal: Identifies stressors that lead to harmful behaviors  Outcome: Progressing  Goal: Notifies staff when experiencing harmful thoughts toward self/others  Outcome: Progressing  Goal: Denies harm toward self or others  Outcome: Progressing  Goal: Free from restraint events  Outcome: Progressing     Problem: Educational/Scholastic Disruption  Goal: Meets educational requirements during hospitalization  Outcome: Progressing  Goal: Attends class without disruptive behavior  Outcome: Progressing  Goal: Completes daily assignments  Outcome: Progressing     Problem: Ineffective Coping  Goal: Identifies ineffective coping skills  Outcome: Progressing  Goal: Identifies healthy coping skills  Outcome: Progressing  Goal: Demonstrates healthy coping skills  Outcome: Progressing  Goal: Participates in unit activities  Outcome: Progressing  Goal: Patient/Family participate in treatment and discharge plans  Outcome: Progressing  Goal: Patient/Family verbalizes awareness of resources  Outcome: Progressing  Goal: Understands least restrictive measures  Outcome: Progressing  Goal: Free from restraint events  Outcome: Progressing     Problem: Alteration in Sleep  Goal: STG - Reports nightly sleep, duration, and quality  Outcome: Progressing  Goal: STG - Identifies sleep hygiene aids  Outcome: Progressing  Goal: STG - Informs staff if unable to sleep  Outcome: Progressing  Goal: STG - Attends breathing and relaxation group  Outcome: Progressing     Problem: Potential for Substance Withdrawal  Goal: Verbalizes signs/symptoms of withdrawal  Outcome: Progressing  Goal: Reports signs/symptoms of withdrawal  Outcome: Progressing  Goal: Free of withdrawal symptoms  Outcome: Progressing     Problem: Anxiety  Goal: Patient/family understands admission protocols  Outcome: Progressing  Goal: Attempts to manage anxiety with help  Outcome: Progressing  Goal: Verbalizes ways to manage  anxiety  Outcome: Progressing  Goal: Implements measures to reduce anxiety  Outcome: Progressing  Goal: Free from restraint events  Outcome: Progressing     Problem: Self Care Deficit  Goal: STG - Patient completes hygiene  Outcome: Progressing  Goal: Increase group attendance  Outcome: Progressing  Goal: Accepts need for medications  Outcome: Progressing  Goal: STG - Goes to and eats meals independently in dining room 100% of time  Outcome: Progressing     Problem: Defensive Coping  Goal: Identifies reckless/dangerous behavior  Outcome: Progressing  Goal: Identifies stressors that lead to reckless/dangerous behavior  Outcome: Progressing  Goal: Discusses and identifies healthy coping skills  Outcome: Progressing  Goal: Demonstrates healthy coping skills  Outcome: Progressing  Goal: Identifies appropriate social interaction  Outcome: Progressing  Goal: Demonstrates appropriate social interactions  Outcome: Progressing  Goal: Patient/Family verbalizes awareness of resources  Outcome: Progressing  Goal: Discusses signs/symptoms of illness/treatment options  Outcome: Progressing  Goal: Patient/Family participate in treatment and discharge plans  Outcome: Progressing  Goal: Understands least restrictive measures  Outcome: Progressing  Goal: Free from restraint events  Outcome: Progressing     Problem: Risk for Suicide  Goal: Accepts medications as prescribed/needed this shift  Outcome: Progressing  Goal: Identifies supports this shift  Outcome: Progressing  Goal: Makes needs known through verbalization or behaviors this shift  Outcome: Progressing  Goal: No self harm this shift  Outcome: Progressing  Goal: Read Safety Guidelines this shift  Outcome: Progressing  Goal: Complete Mental Health Safety Plan (psychiatry only) this shift  Outcome: Progressing
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
## 2025-06-04 NOTE — GROUP NOTE
Group Topic: Decision Making   Group Date: 6/3/2025  Start Time: 1415  End Time: 1500  Facilitators: FILEMON Gomez   Department: [facility]    Number of Participants: 4   Group Focus: communication  Treatment Modality: Cognitive Behavioral Therapy  Interventions utilized were assignment  Purpose: maladaptive thinking    Name: Denice Wheat YOB: 1980   MR: [id number]      Facilitator:   Level of Participation: active  Quality of Participation: appropriate/pleasant  Interactions with others: appropriate  Mood/Affect: appropriate  Triggers (if applicable): n/a  Cognition: coherent/clear  Progress: Minimal  Comments: Denice shared positive thoughts with group and engaged in activity. Denice completed handout on today's topic.   Plan: continue with services

## 2025-06-04 NOTE — PROGRESS NOTES
Endocrinology Progress Note  Patient: Denice Wheat  Unit/Bed: 562/562-B  YOB: 1980  MRN: [id number]  Acct: [id number]   Admitting Diagnosis: Bipolar depression (Multi) [F31.9]  Date:  5/28/2025  Hospital Day: 7  Attending: Florin Villa MD       Complaint:  No chief complaint on file.         Assessment     Problem List[1]       Plan:  Low TSH but not Graves' not hypothyroid  May use lithium but will need to be checked for thyroid labs in the future every several months  Thyroid nodules not needing fine-needle biopsy May need to follow-up with ultrasound in many months to 1 year        SUBJECTIVE    Labs ultrasound reviewed        VITALS      Vitals:    06/02/25 2055 06/03/25 0700 06/03/25 1806 06/04/25 0700   BP:  118/63 115/77 115/57   BP Location:       Patient Position:       Pulse: 68 69 62 66   Resp:  17 18 18   Temp:  36.9 °C (98.4 °F) 36.7 °C (98.1 °F) 36.5 °C (97.7 °F)   TempSrc:       SpO2:  98% 98% 98%     No intake or output data in the 24 hours ending 06/04/25 1301   Wt Readings from Last 4 Encounters:   05/27/25 90.7 kg (200 lb)        Allergies:  RX Allergies[2]     PHYSICAL EXAM   Physical Exam  Deferred    LABS   Magnesium:    Lipid Panel:  Results from last 7 days   Lab Units 05/29/25  0726   HDL mg/dL 75.8   CHOLESTEROL/HDL RATIO  2.7   VLDL mg/dL 28   TRIGLYCERIDES mg/dL 142   NON HDL CHOL. mg/dL 131      Lab Review  Lab Results   Component Value Date    BILITOT 0.3 05/27/2025    CALCIUM 9.4 05/27/2025    CO2 19 (L) 05/27/2025     05/27/2025    CREATININE 0.77 05/27/2025    GLUCOSE 116 (H) 05/27/2025    ALKPHOS 44 05/27/2025    K 4.1 05/27/2025    PROT 7.3 05/27/2025     05/27/2025    AST 15 05/27/2025    ALT 10 05/27/2025    BUN 12 05/27/2025    ANIONGAP 18 05/27/2025    ALBUMIN 4.7 05/27/2025     Lab Results   Component Value Date    TRIG 142 05/29/2025    CHOL 207 (H) 05/29/2025    LDLCALC 103 (H) 05/29/2025    HDL 75.8 05/29/2025     Lab Results   Component  Value Date    HGBA1C 5.1 05/29/2025     The 10-year ASCVD risk score (Lorenzo LOPEZ, et al., 2019) is: 0.4%    Values used to calculate the score:      Age: 44 years      Sex: Female      Is Non- : No      Diabetic: No      Tobacco smoker: No      Systolic Blood Pressure: 115 mmHg      Is BP treated: No      HDL Cholesterol: 75.8 mg/dL      Total Cholesterol: 207 mg/dL   Lab Results   Component Value Date    HGBA1C 5.1 05/29/2025     05/27/2025    K 4.1 05/27/2025     05/27/2025    CO2 19 (L) 05/27/2025    BUN 12 05/27/2025    CREATININE 0.77 05/27/2025    CALCIUM 9.4 05/27/2025    ALBUMIN 4.7 05/27/2025    PROT 7.3 05/27/2025    BILITOT 0.3 05/27/2025    ALKPHOS 44 05/27/2025    ALT 10 05/27/2025    AST 15 05/27/2025    GLUCOSE 116 (H) 05/27/2025    CHOL 207 (H) 05/29/2025    TRIG 142 05/29/2025    HDL 75.8 05/29/2025        Scheduled Medications[3]         Electronically signed by Gustavo Anderson MD on 6/4/2025 at 1:01 PM           [1]   Patient Active Problem List  Diagnosis    Bipolar depression (Multi)   [2]   Allergies  Allergen Reactions    Aspirin Unknown     Medication contraindication per pt   [3] divalproex, 500 mg, oral, q12h COSME  lithium, 150 mg, oral, Nightly  lurasidone, 60 mg, oral, Daily with evening meal  pantoprazole, 40 mg, oral, Daily before breakfast  prazosin, 2 mg, oral, q AM  prazosin, 5 mg, oral, Nightly  traMADol, 50 mg, oral, BID

## 2025-06-04 NOTE — PROGRESS NOTES
Occupational Therapy     REHAB Therapy Assessment & Treatment    Patient Name: Denice Wheat  MRN: [id number]  Today's Date: 6/4/2025      Activity:  Distress Tolerance Skills Group    Attendance:  Attendance  Activity: Discussion/reminisce (coping skills practice)  Participation: Active participation    Treatment Approach  Approach : Group therapy sessions  Patient Stated Goals: none stated  Cognition: Attention, Directions (Pt alert, oriented, & attentive. Follows complex directions independently. Statements are linear, organized, on topic, & demonstrate good insight.)  Social Skills: Demonstrates ability to listen to others, Interacts independently in social activity  Emotional: Mood (Pt mood calm, better today compared to previous groups; affect brighter)  Stress Management/Relaxation Training: Identifies benefits of stress management/relaxation techniques, Identifies difficulty adjusting to illness, hospitalization, or pain  Treatment Approach Comments: Pt attended & participated in morning therapy group focused on distress tolerance. Pt educated on the purpose of grounding & distress tolerance skills, emphasizing their utility in regulating intense emotions or grounding oneself during a PTSD response. First, pt educated on sensory grounding techniques. Pt provided examples of sensory strategies in all 5 senses. Pt experimented w/ some sensory items in group & enjoyed the acupressure rings; talked about how she uses acupressure/self-massage strategies a fair amount to regulate anxiety. Then, pt educated on DBT distress tolerance skills including TIPP (Temperature, Intensive exercise, Paced breathing, PMR) & [employer] ACCEPTS. Pt guided through completion of questions related to [employer] ACCEPTS by identifying strategies they could use to self-soothe under each letter. Pt identified & shared the following: healthy distraction activities including digging in the garden & playing w/ kids; distracting thoughts  "including, \"Getting lost in a good book\"; sensory strategies including enjoying her surroundings, listening to music, cooking & eating a meal. Before EOS, pt able to name 2-3 distress tolerance skills they can use to regulate emotions in the context of daily occupations. Pt demonstrates good understanding.      Encounter Problems       Encounter Problems (Active)       OT Goals       OT Goal 1 (Progressing)       Start:  05/29/25    Expected End:  06/26/25       Communication/Interaction Skills (Self-expression/social Behavior/assertive)  Explore/demonstrate 1-2 effective methods of expressing feelings/wants/needs (can include assertion/engaging/sharing/asking) prior to discharge          OT Goal 2 (Progressing)       Start:  05/29/25    Expected End:  06/26/25       Coping Skills  Explore/identify 1-2 effective strategies of expressing feelings, wants, needs(can include assertion, engaging, sharing, asking) prior to discharge          OT Goal 3 (Progressing)       Start:  05/29/25    Expected End:  06/26/25        Emotion Regulation/self control  Pt will exhibit appropriate range, regulation of emotions, impulse control, frustration tolerance in OT groups prior to discharge.                Education:  Pt given educational handouts on grounding techniques & distress tolerance skills. Reviewed, discussed, & practiced. Pt demonstrates good understanding.       "

## 2025-06-05 LAB
ALBUMIN SERPL BCP-MCNC: 4.6 G/DL (ref 3.4–5)
ALP SERPL-CCNC: 46 U/L (ref 33–110)
ALT SERPL W P-5'-P-CCNC: 12 U/L (ref 7–45)
ANION GAP SERPL CALC-SCNC: 14 MMOL/L (ref 10–20)
AST SERPL W P-5'-P-CCNC: 9 U/L (ref 9–39)
BASOPHILS # BLD AUTO: 0.03 X10*3/UL (ref 0–0.1)
BASOPHILS NFR BLD AUTO: 0.4 %
BILIRUB SERPL-MCNC: 0.6 MG/DL (ref 0–1.2)
BUN SERPL-MCNC: 15 MG/DL (ref 6–23)
CALCIUM SERPL-MCNC: 9.3 MG/DL (ref 8.6–10.3)
CHLORIDE SERPL-SCNC: 106 MMOL/L (ref 98–107)
CO2 SERPL-SCNC: 22 MMOL/L (ref 21–32)
CREAT SERPL-MCNC: 0.85 MG/DL (ref 0.5–1.05)
EGFRCR SERPLBLD CKD-EPI 2021: 87 ML/MIN/1.73M*2
EOSINOPHIL # BLD AUTO: 0.27 X10*3/UL (ref 0–0.7)
EOSINOPHIL NFR BLD AUTO: 3.6 %
ERYTHROCYTE [DISTWIDTH] IN BLOOD BY AUTOMATED COUNT: 12.3 % (ref 11.5–14.5)
GLUCOSE SERPL-MCNC: 100 MG/DL (ref 74–99)
HCT VFR BLD AUTO: 43.2 % (ref 36–46)
HGB BLD-MCNC: 14.7 G/DL (ref 12–16)
IMM GRANULOCYTES # BLD AUTO: 0.02 X10*3/UL (ref 0–0.7)
IMM GRANULOCYTES NFR BLD AUTO: 0.3 % (ref 0–0.9)
LITHIUM SERPL-SCNC: <0.1 MMOL/L (ref 0.6–1.2)
LYMPHOCYTES # BLD AUTO: 2.81 X10*3/UL (ref 1.2–4.8)
LYMPHOCYTES NFR BLD AUTO: 37.8 %
MCH RBC QN AUTO: 29.2 PG (ref 26–34)
MCHC RBC AUTO-ENTMCNC: 34 G/DL (ref 32–36)
MCV RBC AUTO: 86 FL (ref 80–100)
MONOCYTES # BLD AUTO: 0.6 X10*3/UL (ref 0.1–1)
MONOCYTES NFR BLD AUTO: 8.1 %
NEUTROPHILS # BLD AUTO: 3.7 X10*3/UL (ref 1.2–7.7)
NEUTROPHILS NFR BLD AUTO: 49.8 %
NRBC BLD-RTO: 0 /100 WBCS (ref 0–0)
PLATELET # BLD AUTO: 286 X10*3/UL (ref 150–450)
POTASSIUM SERPL-SCNC: 4.5 MMOL/L (ref 3.5–5.3)
PROT SERPL-MCNC: 7.4 G/DL (ref 6.4–8.2)
RBC # BLD AUTO: 5.03 X10*6/UL (ref 4–5.2)
SODIUM SERPL-SCNC: 137 MMOL/L (ref 136–145)
WBC # BLD AUTO: 7.4 X10*3/UL (ref 4.4–11.3)

## 2025-06-05 PROCEDURE — 80178 ASSAY OF LITHIUM: CPT | Performed by: PSYCHIATRY & NEUROLOGY

## 2025-06-05 PROCEDURE — 99232 SBSQ HOSP IP/OBS MODERATE 35: CPT | Performed by: PSYCHIATRY & NEUROLOGY

## 2025-06-05 PROCEDURE — 97530 THERAPEUTIC ACTIVITIES: CPT | Mod: GO

## 2025-06-05 PROCEDURE — 1240000001 HC SEMI-PRIVATE BH ROOM DAILY

## 2025-06-05 PROCEDURE — 97150 GROUP THERAPEUTIC PROCEDURES: CPT | Mod: GO

## 2025-06-05 PROCEDURE — 80053 COMPREHEN METABOLIC PANEL: CPT | Performed by: PSYCHIATRY & NEUROLOGY

## 2025-06-05 PROCEDURE — 85025 COMPLETE CBC W/AUTO DIFF WBC: CPT | Performed by: PSYCHIATRY & NEUROLOGY

## 2025-06-05 PROCEDURE — 2500000001 HC RX 250 WO HCPCS SELF ADMINISTERED DRUGS (ALT 637 FOR MEDICARE OP): Performed by: REGISTERED NURSE

## 2025-06-05 PROCEDURE — 36415 COLL VENOUS BLD VENIPUNCTURE: CPT | Performed by: PSYCHIATRY & NEUROLOGY

## 2025-06-05 PROCEDURE — 2500000001 HC RX 250 WO HCPCS SELF ADMINISTERED DRUGS (ALT 637 FOR MEDICARE OP): Performed by: PSYCHIATRY & NEUROLOGY

## 2025-06-05 RX ADMIN — LITHIUM CARBONATE 150 MG: 150 CAPSULE, GELATIN COATED ORAL at 20:57

## 2025-06-05 RX ADMIN — DIVALPROEX SODIUM 500 MG: 250 TABLET, DELAYED RELEASE ORAL at 06:23

## 2025-06-05 RX ADMIN — PRAZOSIN HYDROCHLORIDE 5 MG: 1 CAPSULE ORAL at 20:57

## 2025-06-05 RX ADMIN — PANTOPRAZOLE SODIUM 40 MG: 40 TABLET, DELAYED RELEASE ORAL at 06:23

## 2025-06-05 RX ADMIN — TRAMADOL HYDROCHLORIDE 50 MG: 50 TABLET, FILM COATED ORAL at 09:13

## 2025-06-05 RX ADMIN — PRAZOSIN HYDROCHLORIDE 2 MG: 1 CAPSULE ORAL at 09:13

## 2025-06-05 RX ADMIN — LURASIDONE HYDROCHLORIDE 60 MG: 40 TABLET, FILM COATED ORAL at 17:41

## 2025-06-05 RX ADMIN — TRAMADOL HYDROCHLORIDE 50 MG: 50 TABLET, FILM COATED ORAL at 20:57

## 2025-06-05 RX ADMIN — DIVALPROEX SODIUM 500 MG: 250 TABLET, DELAYED RELEASE ORAL at 17:41

## 2025-06-05 ASSESSMENT — PAIN SCALES - GENERAL
PAINLEVEL_OUTOF10: 4
PAINLEVEL_OUTOF10: 1
PAINLEVEL_OUTOF10: 1

## 2025-06-05 ASSESSMENT — COLUMBIA-SUICIDE SEVERITY RATING SCALE - C-SSRS
6. HAVE YOU EVER DONE ANYTHING, STARTED TO DO ANYTHING, OR PREPARED TO DO ANYTHING TO END YOUR LIFE?: YES
4. HAVE YOU HAD THESE THOUGHTS AND HAD SOME INTENTION OF ACTING ON THEM?: YES
2. HAVE YOU ACTUALLY HAD ANY THOUGHTS OF KILLING YOURSELF?: NO
6. HAVE YOU EVER DONE ANYTHING, STARTED TO DO ANYTHING, OR PREPARED TO DO ANYTHING TO END YOUR LIFE?: NO
6. HAVE YOU EVER DONE ANYTHING, STARTED TO DO ANYTHING, OR PREPARED TO DO ANYTHING TO END YOUR LIFE?: YES
5. HAVE YOU STARTED TO WORK OUT OR WORKED OUT THE DETAILS OF HOW TO KILL YOURSELF? DO YOU INTEND TO CARRY OUT THIS PLAN?: YES
1. SINCE LAST CONTACT, HAVE YOU WISHED YOU WERE DEAD OR WISHED YOU COULD GO TO SLEEP AND NOT WAKE UP?: NO
2. HAVE YOU ACTUALLY HAD ANY THOUGHTS OF KILLING YOURSELF?: YES
1. IN THE PAST MONTH, HAVE YOU WISHED YOU WERE DEAD OR WISHED YOU COULD GO TO SLEEP AND NOT WAKE UP?: YES

## 2025-06-05 ASSESSMENT — PAIN DESCRIPTION - DESCRIPTORS
DESCRIPTORS: ACHING
DESCRIPTORS: ACHING

## 2025-06-05 ASSESSMENT — PAIN - FUNCTIONAL ASSESSMENT
PAIN_FUNCTIONAL_ASSESSMENT: 0-10

## 2025-06-05 NOTE — GROUP NOTE
Group Topic: Coping Skills   Group Date: 6/4/2025  Start Time: 1450  End Time: 1500  Facilitators: Uma Hernandez RN   Department: [place] [address] [facility]    Number of Participants: 4   Group Focus: coping skills  Treatment Modality: Cognitive Behavioral Therapy  Interventions utilized were group exercise  Purpose: coping skills    Name: Denice Wheat YOB: 1980   MR: [id number]      Facilitator: Registered Nurse  Level of Participation: active  Quality of Participation: attentive, cooperative, and supportive  Interactions with others: appropriate  Mood/Affect: appropriate  Triggers (if applicable): n/a  Cognition: insightful  Progress: Gaining insight or knowledge  Comments: Patient contributed to group discussion regarding stressors and coping skills.  Plan: continue with services

## 2025-06-05 NOTE — CARE PLAN
Problem: Pain - Adult  Goal: Verbalizes/displays adequate comfort level or baseline comfort level  Outcome: Progressing     Problem: Infection - Adult  Goal: Absence of infection at discharge  Outcome: Progressing  Goal: Absence of infection during hospitalization  Outcome: Progressing  Goal: Absence of fever/infection during anticipated neutropenic period  Outcome: Progressing     Problem: Safety - Adult  Goal: Free from fall injury  Outcome: Progressing     Problem: Discharge Planning  Goal: Discharge to home or other facility with appropriate resources  Outcome: Progressing     Problem: Chronic Conditions and Co-morbidities  Goal: Patient's chronic conditions and co-morbidity symptoms are monitored and maintained or improved  Outcome: Progressing     Problem: Nutrition  Goal: Nutrient intake appropriate for maintaining nutritional needs  Outcome: Progressing     Problem: Altered Thought Processes as Evidenced by  Goal: STG - Desires improvement in ability to think and concentrate  Outcome: Progressing  Goal: STG - Participates in Occupational Therapy and other cognitive assessments  Outcome: Progressing     Problem: Potential for Harm to Self or Others  Goal: Cooperates with admission process  Outcome: Progressing  Goal: Participates in unit activities  Outcome: Progressing  Goal: Patient/Family participate in treatment and discharge plans  Outcome: Progressing  Goal: Identifies deescalation techniques  Outcome: Progressing  Goal: Understands least restrictive measures  Outcome: Progressing  Goal: Identifies stressors that lead to harmful behaviors  Outcome: Progressing  Goal: Notifies staff when experiencing harmful thoughts toward self/others  Outcome: Progressing  Goal: Denies harm toward self or others  Outcome: Progressing  Goal: Free from restraint events  Outcome: Progressing     Problem: Educational/Scholastic Disruption  Goal: Meets educational requirements during hospitalization  Outcome:  Progressing  Goal: Attends class without disruptive behavior  Outcome: Progressing  Goal: Completes daily assignments  Outcome: Progressing     Problem: Ineffective Coping  Goal: Identifies ineffective coping skills  Outcome: Progressing  Goal: Identifies healthy coping skills  Outcome: Progressing  Goal: Demonstrates healthy coping skills  Outcome: Progressing  Goal: Participates in unit activities  Outcome: Progressing  Goal: Patient/Family participate in treatment and discharge plans  Outcome: Progressing  Goal: Patient/Family verbalizes awareness of resources  Outcome: Progressing  Goal: Understands least restrictive measures  Outcome: Progressing  Goal: Free from restraint events  Outcome: Progressing     Problem: Alteration in Sleep  Goal: STG - Reports nightly sleep, duration, and quality  Outcome: Progressing  Goal: STG - Identifies sleep hygiene aids  Outcome: Progressing  Goal: STG - Informs staff if unable to sleep  Outcome: Progressing  Goal: STG - Attends breathing and relaxation group  Outcome: Progressing     Problem: Potential for Substance Withdrawal  Goal: Verbalizes signs/symptoms of withdrawal  Outcome: Progressing  Goal: Reports signs/symptoms of withdrawal  Outcome: Progressing  Goal: Free of withdrawal symptoms  Outcome: Progressing     Problem: Anxiety  Goal: Patient/family understands admission protocols  Outcome: Progressing  Goal: Attempts to manage anxiety with help  Outcome: Progressing  Goal: Verbalizes ways to manage anxiety  Outcome: Progressing  Goal: Implements measures to reduce anxiety  Outcome: Progressing  Goal: Free from restraint events  Outcome: Progressing     Problem: Self Care Deficit  Goal: STG - Patient completes hygiene  Outcome: Progressing  Goal: Increase group attendance  Outcome: Progressing  Goal: Accepts need for medications  Outcome: Progressing  Goal: STG - Goes to and eats meals independently in dining room 100% of time  Outcome: Progressing     Problem:  Defensive Coping  Goal: Identifies reckless/dangerous behavior  Outcome: Progressing  Goal: Identifies stressors that lead to reckless/dangerous behavior  Outcome: Progressing  Goal: Discusses and identifies healthy coping skills  Outcome: Progressing  Goal: Demonstrates healthy coping skills  Outcome: Progressing  Goal: Identifies appropriate social interaction  Outcome: Progressing  Goal: Demonstrates appropriate social interactions  Outcome: Progressing  Goal: Patient/Family verbalizes awareness of resources  Outcome: Progressing  Goal: Discusses signs/symptoms of illness/treatment options  Outcome: Progressing  Goal: Patient/Family participate in treatment and discharge plans  Outcome: Progressing  Goal: Understands least restrictive measures  Outcome: Progressing  Goal: Free from restraint events  Outcome: Progressing     Problem: Risk for Suicide  Goal: Accepts medications as prescribed/needed this shift  Outcome: Progressing  Goal: Identifies supports this shift  Outcome: Progressing  Goal: Makes needs known through verbalization or behaviors this shift  Outcome: Progressing  Goal: No self harm this shift  Outcome: Progressing  Goal: Read Safety Guidelines this shift  Outcome: Progressing  Goal: Complete Mental Health Safety Plan (psychiatry only) this shift  Outcome: Progressing   The patient's goals for the shift include sleep    The clinical goals for the shift include sleep    Pt has isolate to her room this evening and rested in her bed.. Pt currently denies suicidal and homicidal ideation and denies auditory and visual hallucinations. Pt affect is flat and mood anxious and depressed and pt took bed time meds.

## 2025-06-05 NOTE — CARE PLAN
"Denice has been calm and cooperative today, compliant with unit milieu.  She has participated in group therapy meetings.  She has blunted affect, is withdrawn.  She verbalizes moderate levels of anxiety and depression, denies having any active suicidal ideations.  She has been compliant with medications, denies feeling any negative side effects.  She states that she slept well last night, states that it is the first time she has not had nightmares in a while.      The patient's goals for the shift include \"Try to have a good day\" - Met    The clinical goals for the shift include Participate in group therapy meetings - Met    Problem: Pain - Adult  Goal: Verbalizes/displays adequate comfort level or baseline comfort level  Outcome: Progressing     Problem: Infection - Adult  Goal: Absence of infection at discharge  Outcome: Progressing  Goal: Absence of infection during hospitalization  Outcome: Progressing  Goal: Absence of fever/infection during anticipated neutropenic period  Outcome: Progressing     Problem: Safety - Adult  Goal: Free from fall injury  Outcome: Progressing     Problem: Discharge Planning  Goal: Discharge to home or other facility with appropriate resources  Outcome: Progressing     Problem: Chronic Conditions and Co-morbidities  Goal: Patient's chronic conditions and co-morbidity symptoms are monitored and maintained or improved  Outcome: Progressing     Problem: Nutrition  Goal: Nutrient intake appropriate for maintaining nutritional needs  Outcome: Progressing     Problem: Altered Thought Processes as Evidenced by  Goal: STG - Desires improvement in ability to think and concentrate  Outcome: Progressing  Goal: STG - Participates in Occupational Therapy and other cognitive assessments  Outcome: Progressing     Problem: Potential for Harm to Self or Others  Goal: Cooperates with admission process  Outcome: Progressing  Goal: Participates in unit activities  Outcome: Progressing  Goal: " Patient/Family participate in treatment and discharge plans  Outcome: Progressing  Goal: Identifies deescalation techniques  Outcome: Progressing  Goal: Understands least restrictive measures  Outcome: Progressing  Goal: Identifies stressors that lead to harmful behaviors  Outcome: Progressing  Goal: Notifies staff when experiencing harmful thoughts toward self/others  Outcome: Progressing  Goal: Denies harm toward self or others  Outcome: Progressing  Goal: Free from restraint events  Outcome: Progressing     Problem: Educational/Scholastic Disruption  Goal: Meets educational requirements during hospitalization  Outcome: Progressing  Goal: Attends class without disruptive behavior  Outcome: Progressing  Goal: Completes daily assignments  Outcome: Progressing     Problem: Ineffective Coping  Goal: Identifies ineffective coping skills  Outcome: Progressing  Goal: Identifies healthy coping skills  Outcome: Progressing  Goal: Demonstrates healthy coping skills  Outcome: Progressing  Goal: Participates in unit activities  Outcome: Progressing  Goal: Patient/Family participate in treatment and discharge plans  Outcome: Progressing  Goal: Patient/Family verbalizes awareness of resources  Outcome: Progressing  Goal: Understands least restrictive measures  Outcome: Progressing  Goal: Free from restraint events  Outcome: Progressing     Problem: Alteration in Sleep  Goal: STG - Reports nightly sleep, duration, and quality  Outcome: Progressing  Goal: STG - Identifies sleep hygiene aids  Outcome: Progressing  Goal: STG - Informs staff if unable to sleep  Outcome: Progressing  Goal: STG - Attends breathing and relaxation group  Outcome: Progressing     Problem: Potential for Substance Withdrawal  Goal: Verbalizes signs/symptoms of withdrawal  Outcome: Progressing  Goal: Reports signs/symptoms of withdrawal  Outcome: Progressing  Goal: Free of withdrawal symptoms  Outcome: Progressing     Problem: Anxiety  Goal: Patient/family  understands admission protocols  Outcome: Progressing  Goal: Attempts to manage anxiety with help  Outcome: Progressing  Goal: Verbalizes ways to manage anxiety  Outcome: Progressing  Goal: Implements measures to reduce anxiety  Outcome: Progressing  Goal: Free from restraint events  Outcome: Progressing     Problem: Self Care Deficit  Goal: STG - Patient completes hygiene  Outcome: Progressing  Goal: Increase group attendance  Outcome: Progressing  Goal: Accepts need for medications  Outcome: Progressing  Goal: STG - Goes to and eats meals independently in dining room 100% of time  Outcome: Progressing     Problem: Defensive Coping  Goal: Identifies reckless/dangerous behavior  Outcome: Progressing  Goal: Identifies stressors that lead to reckless/dangerous behavior  Outcome: Progressing  Goal: Discusses and identifies healthy coping skills  Outcome: Progressing  Goal: Demonstrates healthy coping skills  Outcome: Progressing  Goal: Identifies appropriate social interaction  Outcome: Progressing  Goal: Demonstrates appropriate social interactions  Outcome: Progressing  Goal: Patient/Family verbalizes awareness of resources  Outcome: Progressing  Goal: Discusses signs/symptoms of illness/treatment options  Outcome: Progressing  Goal: Patient/Family participate in treatment and discharge plans  Outcome: Progressing  Goal: Understands least restrictive measures  Outcome: Progressing  Goal: Free from restraint events  Outcome: Progressing     Problem: Risk for Suicide  Goal: Accepts medications as prescribed/needed this shift  Outcome: Progressing  Goal: Identifies supports this shift  Outcome: Progressing  Goal: Makes needs known through verbalization or behaviors this shift  Outcome: Progressing  Goal: No self harm this shift  Outcome: Progressing  Goal: Read Safety Guidelines this shift  Outcome: Progressing  Goal: Complete Mental Health Safety Plan (psychiatry only) this shift  Outcome: Progressing

## 2025-06-05 NOTE — PROGRESS NOTES
"Occupational Therapy     REHAB Therapy Assessment & Treatment    Patient Name: Denice Wheat  MRN: [id number]  Today's Date: 6/5/2025    Attendance:  Attendance  Activity: Discussion/reminisce (OT GRP (PM): Self-Care Inventory group.)  Participation: Active participation    Therapeutic OT GRP(PM)  Treatment Approach  Approach : Group therapy sessions  Patient Stated Goals: \"To go to [employer] to get sober.\"  Cognition: Attention, Directions (Pt attentive & focused for duration of session. Statements & contributions to discussion are linear, organized, & on-topic.)  Social Skills: Cooperates with others in group activity, Interacts independently in social activity  Emotional: Behaviors, Mood (Attitude: Calm, cooperative. Behavior: Appropriate eye contact. Pt mood euthymic)  Stress Management/Relaxation Training: Able to identify stress of pain levels, Performs stress management/relaxation techniques, Identifies benefits of stress management/relaxation techniques  Treatment Approach Comments: OT GRP (PM): Self-Care Inventory group. Pt was able to independently complete self- assessment in the areas of physical, emotional, social, spiritual and professional self-care. Pt was able to identify areas of self-care that he felt he was participating in (taking medications, attending appointments) as well as areas that he would like to focus on improving (improved hygiene, increasing water intake and exercise). Pt discussed feeling that he lacks coping skills as well as leisure and social activities. Pt was receptive to support provided by peers and therapist when discussing these topics and problem solved with therapist strategies to help break down barriers, (ex: actively reaching out and scheduling a time to visit sister vs waiting for her to call). Pt reported enjoyment of group and benefitting from the group setting and listening to other peers. Pt role played rational problem solving tips and reframing (-) thoughts " helped to decrease anxiety for good insight Pt beginning to internalize the concepts learned in the groups.  Encouraged to continue to attend OT and other milieu programming.      Encounter Problems       Encounter Problems (Active)       OT Goals       OT Goal 1 (Progressing)       Start:  05/29/25    Expected End:  06/26/25       Communication/Interaction Skills (Self-expression/social Behavior/assertive)  Explore/demonstrate 1-2 effective methods of expressing feelings/wants/needs (can include assertion/engaging/sharing/asking) prior to discharge          OT Goal 2 (Progressing)       Start:  05/29/25    Expected End:  06/26/25       Coping Skills  Explore/identify 1-2 effective strategies of expressing feelings, wants, needs(can include assertion, engaging, sharing, asking) prior to discharge          OT Goal 3 (Progressing)       Start:  05/29/25    Expected End:  06/26/25        Emotion Regulation/self control  Pt will exhibit appropriate range, regulation of emotions, impulse control, frustration tolerance in OT groups prior to discharge.                    Education Documentation  No documentation found.  Education Comments  No comments found.          Additional Comments:

## 2025-06-05 NOTE — NURSING NOTE
Pt has slept though out the night and remains resting quietly at this time. Will continue to monitor.

## 2025-06-05 NOTE — GROUP NOTE
Group Topic: Community   Group Date: 6/5/2025  Start Time: 1415  End Time: 1500  Facilitators: FILEMON Gomez   Department[facility]    Number of Participants: 6   Group Focus: communication  Treatment Modality: Cognitive Behavioral Therapy  Interventions utilized were assignment  Purpose: maladaptive thinking    Name: Denice Wheat YOB: 1980   MR: [id number]      Facilitator:   Level of Participation: active  Quality of Participation: appropriate/pleasant  Interactions with others: appropriate  Mood/Affect: appropriate  Triggers (if applicable): n/a  Cognition: coherent/clear  Progress: Moderate  Comments: Denice shared positive thoughts with the group and engaged in open discussion.  Plan: continue with services

## 2025-06-05 NOTE — PROGRESS NOTES
"Occupational Therapy     REHAB Therapy Assessment & Treatment    Patient Name: Denice Wheat  MRN: [id number]  Today's Date: 6/5/2025    Attendance:  Attendance  Activity: One-to-one (OT Ther. Act: Conflict resolution, Boundaries & Anger management info/paper was reviewed)  Participation: Active participation    1:1 OT Therapeutic Act:  Treatment Approach  Approach : 1 to1 Therapy sessions  Patient Stated Goals: \"To go to [facility] to get sober.\"  Cognition: Attention, Directions (Pt attentive & focused for duration of session. Statements & contributions to discussion are linear, organized, & on-topic.)  Social Skills: Cooperates with others in group activity, Interacts independently in social activity  Emotional: Behaviors, Mood (Attitude: Calm, cooperative. Behavior: Appropriate eye contact. Pt mood euthymic)  Stress Management/Relaxation Training: Able to identify stress of pain levels, Performs stress management/relaxation techniques, Identifies benefits of stress management/relaxation techniques  Treatment Approach Comments: 1:1 OT Ther. Act: Conflict resolution, Boundaries & Anger management info/paper was reviewed & Pt was educated on relationship conflict resolution & fair fighting rules, including using reflective listening, taking a time-out, using “I” statements, and coming to a compromise. Pt reviewed info on setting healthy boundaries & how this relates to assertive communication. Pt participated in practice of developing healthy boundary-setting statements for example scenarios, demonstrating good understanding. In addition, Pt actively discussed managing anger when communication & conflict resolution don’t solve a problem. Pt discussed some of their existing healthy anger management strategies such as “chewing ice” & taking a walk. Pt educated on additional anger management strategies, including constructive methods (exercise, housework), letter writing exercise, & relaxation techniques. Pt shared one " skill they would like to use more, which was setting healthier boundaries with her esatranged SO (Juan) re: the relationship they have & shared relations w/their 18 yo son. Pt shared with OT the journal currently used while on [employer] to write down examples of setting boundaries with family & friends so to make expectations clearly known to all. Pt demonstrates good understanding.      Encounter Problems       Encounter Problems (Active)       OT Goals       OT Goal 1 (Progressing)       Start:  05/29/25    Expected End:  06/26/25       Communication/Interaction Skills (Self-expression/social Behavior/assertive)  Explore/demonstrate 1-2 effective methods of expressing feelings/wants/needs (can include assertion/engaging/sharing/asking) prior to discharge          OT Goal 2 (Progressing)       Start:  05/29/25    Expected End:  06/26/25       Coping Skills  Explore/identify 1-2 effective strategies of expressing feelings, wants, needs(can include assertion, engaging, sharing, asking) prior to discharge          OT Goal 3 (Progressing)       Start:  05/29/25    Expected End:  06/26/25        Emotion Regulation/self control  Pt will exhibit appropriate range, regulation of emotions, impulse control, frustration tolerance in OT groups prior to discharge.

## 2025-06-05 NOTE — PROGRESS NOTES
"Occupational Therapy     REHAB Therapy Assessment & Treatment    Patient Name: Denice Wheat  MRN: [id number]  Today's Date: 6/5/2025      Attendance:  Attendance  Activity: Discussion/reminisce (Guided Meditation practice “Mindful Way through Depression” CD was practiced followed by discussion.)  Participation: Active participation    Therapeutic OT GRP(AM)  Treatment Approach  Approach : Group therapy sessions  Patient Stated Goals: \"To go to [facility] to get sober.\"  Cognition: Attention, Directions (Pt attentive & focused for duration of session. Statements & contributions to discussion are linear, organized, & on-topic.)  Social Skills: Cooperates with others in group activity, Interacts independently in social activity  Emotional: Behaviors, Mood (Attitude: Calm, cooperative. Behavior: Appropriate eye contact. Pt mood euthymic)  Stress Management/Relaxation Training: Able to identify stress of pain levels, Performs stress management/relaxation techniques, Identifies benefits of stress management/relaxation techniques  Treatment Approach Comments: OT GRP(AM) Guided Meditation practice “Mindful Way through Depression” CD was practiced followed by discussion. Pts discussed the emotion of depression vs the S/S of clinical depression and how in infiltrates a person’s life in the 4 major areas of Sleep, self-care, work & play. Next, Pt learned about \"Mindfulness: What it is & its purpose in one’s daily routine”. Pt was educated on the key elements including \"Awareness & Acceptance,\" of one’s thoughts during this process & the benefits re: improved concentration, greater resilience to stress and improved interpersonal relationships as well relief from depression, anxiety & obsessing. Then Pts sat in their chairs with in a quiet room as they practiced & listening to a 11min session CD narrated by Shashi Pineda on “Mindfulness of the Breath”. Afterwards, group discussion identified the areas where incorporating this " "technique could improve one’s sleep, energy and emotional self-regulation skills. Pt noted anxiety level had decreased slightly giving Pt a plan to try this evening to improve quality of one’s sleep following the 15min session. In addition, Pt asked appropriate questions creating a brief discussion on the benefits of Mindfulness skills in dealing with chronic pain and \"... seeing it as a natural part of life for many,' per Pt. Overall, Pt was very receptive to the activity with active listening & appropriate questions and feedback.      Encounter Problems       Encounter Problems (Active)       OT Goals       OT Goal 1 (Progressing)       Start:  05/29/25    Expected End:  06/26/25       Communication/Interaction Skills (Self-expression/social Behavior/assertive)  Explore/demonstrate 1-2 effective methods of expressing feelings/wants/needs (can include assertion/engaging/sharing/asking) prior to discharge          OT Goal 2 (Progressing)       Start:  05/29/25    Expected End:  06/26/25       Coping Skills  Explore/identify 1-2 effective strategies of expressing feelings, wants, needs(can include assertion, engaging, sharing, asking) prior to discharge          OT Goal 3 (Progressing)       Start:  05/29/25    Expected End:  06/26/25        Emotion Regulation/self control  Pt will exhibit appropriate range, regulation of emotions, impulse control, frustration tolerance in OT groups prior to discharge.                    Education Documentation  No documentation found.  Education Comments  No comments found.          Additional Comments:      "

## 2025-06-06 LAB
HOLD SPECIMEN: NORMAL
HOLD SPECIMEN: NORMAL
VALPROATE SERPL-MCNC: 40 UG/ML (ref 50–100)

## 2025-06-06 PROCEDURE — 80164 ASSAY DIPROPYLACETIC ACD TOT: CPT | Performed by: PSYCHIATRY & NEUROLOGY

## 2025-06-06 PROCEDURE — 97150 GROUP THERAPEUTIC PROCEDURES: CPT | Mod: GO

## 2025-06-06 PROCEDURE — 36415 COLL VENOUS BLD VENIPUNCTURE: CPT | Performed by: PSYCHIATRY & NEUROLOGY

## 2025-06-06 PROCEDURE — 97530 THERAPEUTIC ACTIVITIES: CPT | Mod: GO

## 2025-06-06 PROCEDURE — 1240000001 HC SEMI-PRIVATE BH ROOM DAILY

## 2025-06-06 PROCEDURE — 2500000001 HC RX 250 WO HCPCS SELF ADMINISTERED DRUGS (ALT 637 FOR MEDICARE OP): Performed by: PSYCHIATRY & NEUROLOGY

## 2025-06-06 PROCEDURE — 99232 SBSQ HOSP IP/OBS MODERATE 35: CPT | Performed by: PSYCHIATRY & NEUROLOGY

## 2025-06-06 PROCEDURE — 2500000001 HC RX 250 WO HCPCS SELF ADMINISTERED DRUGS (ALT 637 FOR MEDICARE OP): Performed by: REGISTERED NURSE

## 2025-06-06 RX ADMIN — PANTOPRAZOLE SODIUM 40 MG: 40 TABLET, DELAYED RELEASE ORAL at 08:28

## 2025-06-06 RX ADMIN — TRAMADOL HYDROCHLORIDE 50 MG: 50 TABLET, FILM COATED ORAL at 08:28

## 2025-06-06 RX ADMIN — TRAMADOL HYDROCHLORIDE 50 MG: 50 TABLET, FILM COATED ORAL at 21:10

## 2025-06-06 RX ADMIN — LITHIUM CARBONATE 150 MG: 150 CAPSULE, GELATIN COATED ORAL at 21:10

## 2025-06-06 RX ADMIN — LURASIDONE HYDROCHLORIDE 60 MG: 40 TABLET, FILM COATED ORAL at 18:10

## 2025-06-06 RX ADMIN — HYDROXYZINE HYDROCHLORIDE 50 MG: 25 TABLET ORAL at 17:51

## 2025-06-06 RX ADMIN — PRAZOSIN HYDROCHLORIDE 2 MG: 1 CAPSULE ORAL at 08:28

## 2025-06-06 RX ADMIN — PRAZOSIN HYDROCHLORIDE 5 MG: 1 CAPSULE ORAL at 21:09

## 2025-06-06 RX ADMIN — DIVALPROEX SODIUM 500 MG: 250 TABLET, DELAYED RELEASE ORAL at 17:41

## 2025-06-06 RX ADMIN — DIVALPROEX SODIUM 500 MG: 250 TABLET, DELAYED RELEASE ORAL at 09:45

## 2025-06-06 ASSESSMENT — COLUMBIA-SUICIDE SEVERITY RATING SCALE - C-SSRS
1. IN THE PAST MONTH, HAVE YOU WISHED YOU WERE DEAD OR WISHED YOU COULD GO TO SLEEP AND NOT WAKE UP?: YES
6. HAVE YOU EVER DONE ANYTHING, STARTED TO DO ANYTHING, OR PREPARED TO DO ANYTHING TO END YOUR LIFE?: YES
4. HAVE YOU HAD THESE THOUGHTS AND HAD SOME INTENTION OF ACTING ON THEM?: YES
5. HAVE YOU STARTED TO WORK OUT OR WORKED OUT THE DETAILS OF HOW TO KILL YOURSELF? DO YOU INTEND TO CARRY OUT THIS PLAN?: YES
6. HAVE YOU EVER DONE ANYTHING, STARTED TO DO ANYTHING, OR PREPARED TO DO ANYTHING TO END YOUR LIFE?: YES
2. HAVE YOU ACTUALLY HAD ANY THOUGHTS OF KILLING YOURSELF?: YES
6. HAVE YOU EVER DONE ANYTHING, STARTED TO DO ANYTHING, OR PREPARED TO DO ANYTHING TO END YOUR LIFE?: DENIES AT THIS TIME.

## 2025-06-06 ASSESSMENT — PAIN SCALES - GENERAL
PAINLEVEL_OUTOF10: 3
PAINLEVEL_OUTOF10: 0 - NO PAIN

## 2025-06-06 ASSESSMENT — PAIN - FUNCTIONAL ASSESSMENT
PAIN_FUNCTIONAL_ASSESSMENT: 0-10
PAIN_FUNCTIONAL_ASSESSMENT: 0-10

## 2025-06-06 ASSESSMENT — PAIN DESCRIPTION - DESCRIPTORS: DESCRIPTORS: ACHING

## 2025-06-06 NOTE — PROGRESS NOTES
"Occupational Therapy     REHAB Therapy Assessment & Treatment    Patient Name: Denice Wheat  MRN: [id number]  Today's Date: 6/6/2025    Attendance:  Attendance  Activity: One-to-one (1:1 OT Ther. Act: Untangling Your Thoughts paper was reviewed.)  Participation: Active participation    1:1 OT Therapeutic Act:  Treatment Approach  Approach : 1 to1 Therapy sessions  Patient Stated Goals: \"To get a handle on my nightmares with relaxation skills.\"  Cognition: Attention, Directions (Pt follows complex multi-step directions independently. Pt attentive, alert, & oriented. Pt demonstrate fair-good insight; organized understanding of material reviewed.)  Social Skills: Demonstrates ability to listen to others  Emotional: Behaviors, Mood (Attitude: Calm, cooperative. Behavior: Appropriate eye contact.)  Stress Management/Relaxation Training: Able to identify stress of pain levels, Performs stress management/relaxation techniques, Identifies benefits of stress management/relaxation techniques  Treatment Approach Comments: 1:1 OT Ther. Act: Untangling Your Thoughts paper was reviewed. Pt educated on irrational vs rational viewpoints & how irrational thinking impede healthy problem solving skills and increases one’s own stress/anxiety. Pt reviewed several pictured descriptions of irrational thinking such as “Black/White” thinking, Magnifying/Minimizing”, “Labeling”, “Jumping to conclusions”, “Fortune Telling”, “Blaming” and “Emotional Reasoning” actually add to one’s sense of anxiety and depression thus adding to anxiety. Pt verbalized how perfectionistic thinking and mood altering substances can impede rational thinking and impair one’s self esteem. Regarding rational, (+) self-talk, Pt was educated on the concepts of seeing the \"shades of gray vs Black/White thinking\", the concept of doing a 'reality check' form of challenging one's irrational beliefs. Pt discussed the concept of \"substitute terms\" to select the appropriate " "emotion to fit the situation, ie: instead of devastated say that you're frustrated about a situation then use “thought substitution” where one changing a negative thought into a positive and realistic thought such as \"I was anxious being here, but being in the hospital gave me some tips to cope and new medication that seems to help,\" per Pt. Pt role played rational problem solving tips and reframing (-) thoughts to process how one may not feel like they have control over fire or death, but one has choices in how one reacts to stress such as calling for help seeking help for physical/mental care to address distressful issues to decrease anxiety for good insight. Pt beginning to internalize the concepts learned in the OT sessions.      Encounter Problems       Encounter Problems (Active)       OT Goals       OT Goal 1 (Progressing)       Start:  05/29/25    Expected End:  06/26/25       Communication/Interaction Skills (Self-expression/social Behavior/assertive)  Explore/demonstrate 1-2 effective methods of expressing feelings/wants/needs (can include assertion/engaging/sharing/asking) prior to discharge          OT Goal 2 (Progressing)       Start:  05/29/25    Expected End:  06/26/25       Coping Skills  Explore/identify 1-2 effective strategies of expressing feelings, wants, needs(can include assertion, engaging, sharing, asking) prior to discharge          OT Goal 3 (Progressing)       Start:  05/29/25    Expected End:  06/26/25        Emotion Regulation/self control  Pt will exhibit appropriate range, regulation of emotions, impulse control, frustration tolerance in OT groups prior to discharge.                      "

## 2025-06-06 NOTE — CARE PLAN
The patient's goals for the shift include sleep    The clinical goals for the shift include GROUP    Over the shift, the patient did not make progress toward the following goals. Barriers to progression include clinical presentation. Recommendations to address these barriers include med compliance.      Pt denies AVH/SI/HI    States she has no depression and anxiety is 4/10

## 2025-06-06 NOTE — PROGRESS NOTES
Denice Wheat is a 44 y.o. female on day 9 of admission presenting with bipolar disorder.    Subjective   Patient reported difficult night last night, had very vivid dreams recounting past traumas.  Has caused more anxiety today.  Despite this she remains active in group therapy, working on coping skills.  Still planning to go to residential [facility] treatment once discharged from the hospital.  Overall tolerating medications well with no adverse effects.    Objective     MSE  General: Appropriately groomed and dressed.  Appearance: Appears stated age.  Attitude: Calm, cooperative.  Behavior: Appropriate eye contact.  Motor activity: No agitation or retardation. no EPS.  Normal gait.  Speech: Regular rate, rhythm, volume and tone.  Mood: Anxious  Affect: Appropriate range  Thought process: Organized, linear, goal-directed.  Associations are logical.  Thought content: Does not endorse suicidal or homicidal ideation, no delusions elicited.  Thought perception: Did not endorse auditory or visual hallucinations.  Cognition: Alert, oriented x3.  no deficit in memory or attention.  Insight: Fair.  Judgment: Fair.    Last Recorded Vitals  /67   Pulse 63   Temp 36.7 °C (98.1 °F)   Resp 18   SpO2 96%      Relevant Results  Scheduled medications  Scheduled Medications[1]  Continuous medications  Continuous Medications[2]  PRN medications  PRN Medications[3]    Results for orders placed or performed during the hospital encounter of 05/28/25 (from the past 24 hours)   Valproic Acid   Result Value Ref Range    Valproic Acid 40 (L) 50 - 100 ug/mL   Lavender Top   Result Value Ref Range    Extra Tube Hold for add-ons.    SST TOP   Result Value Ref Range    Extra Tube Hold for add-ons.         Assessment/Plan   Diagnosis:  Bipolar depression    Impression:     Labs and Chart: reviewed  Case discussed with treatment team members  Encouraged patient to attend group and other mileu activity  Collateral from family -  pending  Discharge planing  Medication:       - Reviewed. To continue as ordered    Medication Consent  Medication Consent: risks, benefits, side effects reviewed for all ordered meds and patient expressed understanding and consent obtained    GOKUL Church-CNP          [1] divalproex, 500 mg, oral, q12h COSME  lithium, 150 mg, oral, Nightly  lurasidone, 60 mg, oral, Daily with evening meal  pantoprazole, 40 mg, oral, Daily before breakfast  prazosin, 2 mg, oral, q AM  prazosin, 5 mg, oral, Nightly  traMADol, 50 mg, oral, BID  [2]    [3] PRN medications: acetaminophen, alum-mag hydroxide-simeth, hydrOXYzine HCL, ibuprofen, OLANZapine, OLANZapine

## 2025-06-06 NOTE — PROGRESS NOTES
Endocrinology Progress Note  Patient: Denice Wheat  Unit/Bed: 562/562-B  YOB: 1980  MRN: [id number]  Acct: [id number]   Admitting Diagnosis: Bipolar depression (Multi) [F31.9]  Date:  5/28/2025  Hospital Day: 9  Attending: Florin Villa MD       Complaint:  No chief complaint on file.         Assessment     Problem List[1]   Multinodular goiter stable  Mild low TSH with out any hypothyroidism no treatment needed    Plan:  No change        SUBJECTIVE    Reviewed labs and ultrasound          VITALS      Vitals:    06/04/25 1900 06/05/25 0722 06/05/25 1900 06/06/25 0739   BP: 108/70 109/70 114/67 119/67   BP Location: Right arm  Left arm    Patient Position: Sitting  Sitting    Pulse: 71 70 62 63   Resp: 16 16 18 18   Temp: 36.5 °C (97.7 °F) 36.9 °C (98.4 °F) 36.8 °C (98.2 °F) 36.7 °C (98.1 °F)   TempSrc: Temporal  Tympanic    SpO2: 96% 97% 97% 96%     No intake or output data in the 24 hours ending 06/06/25 1850   Wt Readings from Last 4 Encounters:   05/27/25 90.7 kg (200 lb)        Allergies:  RX Allergies[2]     PHYSICAL EXAM   Physical Exam  Deferred    LABS   Magnesium:    Lipid Panel:       Lab Review  Lab Results   Component Value Date    BILITOT 0.6 06/05/2025    CALCIUM 9.3 06/05/2025    CO2 22 06/05/2025     06/05/2025    CREATININE 0.85 06/05/2025    GLUCOSE 100 (H) 06/05/2025    ALKPHOS 46 06/05/2025    K 4.5 06/05/2025    PROT 7.4 06/05/2025     06/05/2025    AST 9 06/05/2025    ALT 12 06/05/2025    BUN 15 06/05/2025    ANIONGAP 14 06/05/2025    ALBUMIN 4.6 06/05/2025     Lab Results   Component Value Date    TRIG 142 05/29/2025    CHOL 207 (H) 05/29/2025    LDLCALC 103 (H) 05/29/2025    HDL 75.8 05/29/2025     Lab Results   Component Value Date    HGBA1C 5.1 05/29/2025     The 10-year ASCVD risk score (Lorenzo LOPEZ, et al., 2019) is: 0.4%    Values used to calculate the score:      Age: 44 years      Sex: Female      Is Non- : No      Diabetic: No       Tobacco smoker: No      Systolic Blood Pressure: 119 mmHg      Is BP treated: No      HDL Cholesterol: 75.8 mg/dL      Total Cholesterol: 207 mg/dL   Lab Results   Component Value Date    HGBA1C 5.1 05/29/2025     06/05/2025    K 4.5 06/05/2025     06/05/2025    CO2 22 06/05/2025    BUN 15 06/05/2025    CREATININE 0.85 06/05/2025    CALCIUM 9.3 06/05/2025    ALBUMIN 4.6 06/05/2025    PROT 7.4 06/05/2025    BILITOT 0.6 06/05/2025    ALKPHOS 46 06/05/2025    ALT 12 06/05/2025    AST 9 06/05/2025    GLUCOSE 100 (H) 06/05/2025    CHOL 207 (H) 05/29/2025    TRIG 142 05/29/2025    HDL 75.8 05/29/2025        Scheduled Medications[3]         Electronically signed by Gustavo Anderson MD on 6/6/2025 at 6:50 PM           [1]   Patient Active Problem List  Diagnosis    Bipolar depression (Multi)   [2]   Allergies  Allergen Reactions    Aspirin Unknown     Medication contraindication per pt   [3] divalproex, 500 mg, oral, q12h COSME  lithium, 150 mg, oral, Nightly  lurasidone, 60 mg, oral, Daily with evening meal  pantoprazole, 40 mg, oral, Daily before breakfast  prazosin, 2 mg, oral, q AM  prazosin, 5 mg, oral, Nightly  traMADol, 50 mg, oral, BID

## 2025-06-06 NOTE — CARE PLAN
Denice has been calm and cooperative today, compliant with unit milieu.  She has participated in group therapy meetings.  Denies any suicidal/homicidal thoughts.  Denies feeling anxious or depressed today.  Flat affect, minimizes actions.            The patient's goals for the shift include attend group    The clinical goals for the shift include Participate in group therapy meetings    Problem: Pain - Adult  Goal: Verbalizes/displays adequate comfort level or baseline comfort level  Outcome: Progressing     Problem: Infection - Adult  Goal: Absence of infection at discharge  Outcome: Progressing  Goal: Absence of infection during hospitalization  Outcome: Progressing  Goal: Absence of fever/infection during anticipated neutropenic period  Outcome: Progressing     Problem: Safety - Adult  Goal: Free from fall injury  Outcome: Progressing     Problem: Discharge Planning  Goal: Discharge to home or other facility with appropriate resources  Outcome: Progressing     Problem: Chronic Conditions and Co-morbidities  Goal: Patient's chronic conditions and co-morbidity symptoms are monitored and maintained or improved  Outcome: Progressing     Problem: Nutrition  Goal: Nutrient intake appropriate for maintaining nutritional needs  Outcome: Progressing     Problem: Altered Thought Processes as Evidenced by  Goal: STG - Desires improvement in ability to think and concentrate  Outcome: Progressing  Goal: STG - Participates in Occupational Therapy and other cognitive assessments  Outcome: Progressing     Problem: Potential for Harm to Self or Others  Goal: Cooperates with admission process  Outcome: Progressing  Goal: Participates in unit activities  Outcome: Progressing  Goal: Patient/Family participate in treatment and discharge plans  Outcome: Progressing  Goal: Identifies deescalation techniques  Outcome: Progressing  Goal: Understands least restrictive measures  Outcome: Progressing  Goal: Identifies stressors that lead  to harmful behaviors  Outcome: Progressing  Goal: Notifies staff when experiencing harmful thoughts toward self/others  Outcome: Progressing  Goal: Denies harm toward self or others  Outcome: Progressing  Goal: Free from restraint events  Outcome: Progressing     Problem: Educational/Scholastic Disruption  Goal: Meets educational requirements during hospitalization  Outcome: Progressing  Goal: Attends class without disruptive behavior  Outcome: Progressing  Goal: Completes daily assignments  Outcome: Progressing     Problem: Ineffective Coping  Goal: Identifies ineffective coping skills  Outcome: Progressing  Goal: Identifies healthy coping skills  Outcome: Progressing  Goal: Demonstrates healthy coping skills  Outcome: Progressing  Goal: Participates in unit activities  Outcome: Progressing  Goal: Patient/Family participate in treatment and discharge plans  Outcome: Progressing  Goal: Patient/Family verbalizes awareness of resources  Outcome: Progressing  Goal: Understands least restrictive measures  Outcome: Progressing  Goal: Free from restraint events  Outcome: Progressing     Problem: Alteration in Sleep  Goal: STG - Reports nightly sleep, duration, and quality  Outcome: Progressing  Goal: STG - Identifies sleep hygiene aids  Outcome: Progressing  Goal: STG - Informs staff if unable to sleep  Outcome: Progressing  Goal: STG - Attends breathing and relaxation group  Outcome: Progressing     Problem: Potential for Substance Withdrawal  Goal: Verbalizes signs/symptoms of withdrawal  Outcome: Progressing  Goal: Reports signs/symptoms of withdrawal  Outcome: Progressing  Goal: Free of withdrawal symptoms  Outcome: Progressing     Problem: Anxiety  Goal: Patient/family understands admission protocols  Outcome: Progressing  Goal: Attempts to manage anxiety with help  Outcome: Progressing  Goal: Verbalizes ways to manage anxiety  Outcome: Progressing  Goal: Implements measures to reduce anxiety  Outcome:  Progressing  Goal: Free from restraint events  Outcome: Progressing     Problem: Self Care Deficit  Goal: STG - Patient completes hygiene  Outcome: Progressing  Goal: Increase group attendance  Outcome: Progressing  Goal: Accepts need for medications  Outcome: Progressing  Goal: STG - Goes to and eats meals independently in dining room 100% of time  Outcome: Progressing     Problem: Defensive Coping  Goal: Identifies reckless/dangerous behavior  Outcome: Progressing  Goal: Identifies stressors that lead to reckless/dangerous behavior  Outcome: Progressing  Goal: Discusses and identifies healthy coping skills  Outcome: Progressing  Goal: Demonstrates healthy coping skills  Outcome: Progressing  Goal: Identifies appropriate social interaction  Outcome: Progressing  Goal: Demonstrates appropriate social interactions  Outcome: Progressing  Goal: Patient/Family verbalizes awareness of resources  Outcome: Progressing  Goal: Discusses signs/symptoms of illness/treatment options  Outcome: Progressing  Goal: Patient/Family participate in treatment and discharge plans  Outcome: Progressing  Goal: Understands least restrictive measures  Outcome: Progressing  Goal: Free from restraint events  Outcome: Progressing     Problem: Risk for Suicide  Goal: Accepts medications as prescribed/needed this shift  Outcome: Progressing  Goal: Identifies supports this shift  Outcome: Progressing  Goal: Makes needs known through verbalization or behaviors this shift  Outcome: Progressing  Goal: No self harm this shift  Outcome: Progressing  Goal: Read Safety Guidelines this shift  Outcome: Progressing  Goal: Complete Mental Health Safety Plan (psychiatry only) this shift  Outcome: Progressing

## 2025-06-06 NOTE — PROGRESS NOTES
Denice Wheat is a 44 y.o. female on day 9 of admission presenting with bipolar disorder.    Subjective   Patient ports improved mood.  Slept well last night, dreams were less vivid and she did not wake up anxious.  Remains active on unit, chest pain group therapy.  Tolerating medication with no adverse effects.    Objective     MSE  General: Appropriately groomed and dressed.  Appearance: Appears stated age.  Attitude: Calm, cooperative.  Behavior: Appropriate eye contact.  Motor activity: No agitation or retardation. no EPS.  Normal gait.  Speech: Regular rate, rhythm, volume and tone.  Mood: Less anxious  Affect: Appropriate range  Thought process: Organized, linear, goal-directed.  Associations are logical.  Thought content: Does not endorse suicidal or homicidal ideation, no delusions elicited.  Thought perception: Did not endorse auditory or visual hallucinations.  Cognition: Alert, oriented x3.  no deficit in memory or attention.  Insight: Fair.  Judgment: Fair.    Last Recorded Vitals  /67   Pulse 63   Temp 36.7 °C (98.1 °F)   Resp 18   SpO2 96%      Relevant Results  Scheduled medications  Scheduled Medications[1]  Continuous medications  Continuous Medications[2]  PRN medications  PRN Medications[3]    Results for orders placed or performed during the hospital encounter of 05/28/25 (from the past 24 hours)   Valproic Acid   Result Value Ref Range    Valproic Acid 40 (L) 50 - 100 ug/mL   Lavender Top   Result Value Ref Range    Extra Tube Hold for add-ons.    SST TOP   Result Value Ref Range    Extra Tube Hold for add-ons.         Assessment/Plan   Diagnosis:  Bipolar depression    Impression:     Labs and Chart: reviewed  Case discussed with treatment team members  Encouraged patient to attend group and other mileu activity  Collateral from family - pending  Discharge planing  Medication:       - Reviewed. To continue as ordered    Medication Consent  Medication Consent: risks, benefits, side  effects reviewed for all ordered meds and patient expressed understanding and consent obtained    Timothy Rod, APRN-CNP          [1] divalproex, 500 mg, oral, q12h COSME  lithium, 150 mg, oral, Nightly  lurasidone, 60 mg, oral, Daily with evening meal  pantoprazole, 40 mg, oral, Daily before breakfast  prazosin, 2 mg, oral, q AM  prazosin, 5 mg, oral, Nightly  traMADol, 50 mg, oral, BID  [2]    [3] PRN medications: acetaminophen, alum-mag hydroxide-simeth, hydrOXYzine HCL, ibuprofen, OLANZapine, OLANZapine

## 2025-06-06 NOTE — PROGRESS NOTES
Pt is accepted to [facility]. [facility] told [facility] [address] [facility] [place] [id number] [phone]  that  will set up transportation. [facility] will send updated notes to [facility].     ALAN informed [facility] that pt discharge is looking like Monday 6/9. [facility] said that is okay.       OMAR Villatoro
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
## 2025-06-06 NOTE — NURSING NOTE
Denice tearful during dinner today, states that a song someone was playing reminded her of her significant other.  Spoke with patient for a while and reminisced, discussed future plans and struggles.  She states that her son is upset with her and is not speaking with her right now, states that her daughter is supportive and has been helping her gather belongings from her home and put them in storage.  She was given hydroxyzine to help with anxiety and was eventually able to calm down.  She does express hopelessness, however denies having any suicidal ideations.

## 2025-06-07 PROCEDURE — 1240000001 HC SEMI-PRIVATE BH ROOM DAILY

## 2025-06-07 PROCEDURE — 97150 GROUP THERAPEUTIC PROCEDURES: CPT | Mod: GO

## 2025-06-07 PROCEDURE — 99232 SBSQ HOSP IP/OBS MODERATE 35: CPT | Performed by: PSYCHIATRY & NEUROLOGY

## 2025-06-07 PROCEDURE — 2500000001 HC RX 250 WO HCPCS SELF ADMINISTERED DRUGS (ALT 637 FOR MEDICARE OP): Performed by: REGISTERED NURSE

## 2025-06-07 PROCEDURE — 2500000001 HC RX 250 WO HCPCS SELF ADMINISTERED DRUGS (ALT 637 FOR MEDICARE OP): Performed by: PSYCHIATRY & NEUROLOGY

## 2025-06-07 RX ORDER — DIPHENHYDRAMINE HCL 25 MG
25 TABLET ORAL NIGHTLY PRN
Status: DISPENSED | OUTPATIENT
Start: 2025-06-07

## 2025-06-07 RX ORDER — LURASIDONE HYDROCHLORIDE 40 MG/1
80 TABLET, FILM COATED ORAL
Status: DISPENSED | OUTPATIENT
Start: 2025-06-07

## 2025-06-07 RX ADMIN — LURASIDONE HYDROCHLORIDE 80 MG: 40 TABLET, FILM COATED ORAL at 17:25

## 2025-06-07 RX ADMIN — PRAZOSIN HYDROCHLORIDE 2 MG: 1 CAPSULE ORAL at 08:43

## 2025-06-07 RX ADMIN — LITHIUM CARBONATE 150 MG: 150 CAPSULE, GELATIN COATED ORAL at 21:14

## 2025-06-07 RX ADMIN — DIVALPROEX SODIUM 500 MG: 250 TABLET, DELAYED RELEASE ORAL at 06:28

## 2025-06-07 RX ADMIN — TRAMADOL HYDROCHLORIDE 50 MG: 50 TABLET, FILM COATED ORAL at 08:43

## 2025-06-07 RX ADMIN — DIVALPROEX SODIUM 500 MG: 250 TABLET, DELAYED RELEASE ORAL at 18:36

## 2025-06-07 RX ADMIN — TRAMADOL HYDROCHLORIDE 50 MG: 50 TABLET, FILM COATED ORAL at 21:15

## 2025-06-07 RX ADMIN — PANTOPRAZOLE SODIUM 40 MG: 40 TABLET, DELAYED RELEASE ORAL at 06:28

## 2025-06-07 RX ADMIN — PRAZOSIN HYDROCHLORIDE 5 MG: 1 CAPSULE ORAL at 21:15

## 2025-06-07 ASSESSMENT — COLUMBIA-SUICIDE SEVERITY RATING SCALE - C-SSRS
6. HAVE YOU EVER DONE ANYTHING, STARTED TO DO ANYTHING, OR PREPARED TO DO ANYTHING TO END YOUR LIFE?: PT DENIES HAVING SUICIDAL THOUGHTS AT THIS TIME.
6. HAVE YOU EVER DONE ANYTHING, STARTED TO DO ANYTHING, OR PREPARED TO DO ANYTHING TO END YOUR LIFE?: YES
6. HAVE YOU EVER DONE ANYTHING, STARTED TO DO ANYTHING, OR PREPARED TO DO ANYTHING TO END YOUR LIFE?: DENIES ANY THOUGHTS OF SI AT THIS TIME
2. HAVE YOU ACTUALLY HAD ANY THOUGHTS OF KILLING YOURSELF?: YES
6. HAVE YOU EVER DONE ANYTHING, STARTED TO DO ANYTHING, OR PREPARED TO DO ANYTHING TO END YOUR LIFE?: YES
1. IN THE PAST MONTH, HAVE YOU WISHED YOU WERE DEAD OR WISHED YOU COULD GO TO SLEEP AND NOT WAKE UP?: YES
5. HAVE YOU STARTED TO WORK OUT OR WORKED OUT THE DETAILS OF HOW TO KILL YOURSELF? DO YOU INTEND TO CARRY OUT THIS PLAN?: YES
6. HAVE YOU EVER DONE ANYTHING, STARTED TO DO ANYTHING, OR PREPARED TO DO ANYTHING TO END YOUR LIFE?: YES
6. HAVE YOU EVER DONE ANYTHING, STARTED TO DO ANYTHING, OR PREPARED TO DO ANYTHING TO END YOUR LIFE?: YES
2. HAVE YOU ACTUALLY HAD ANY THOUGHTS OF KILLING YOURSELF?: YES
1. IN THE PAST MONTH, HAVE YOU WISHED YOU WERE DEAD OR WISHED YOU COULD GO TO SLEEP AND NOT WAKE UP?: YES
4. HAVE YOU HAD THESE THOUGHTS AND HAD SOME INTENTION OF ACTING ON THEM?: YES
5. HAVE YOU STARTED TO WORK OUT OR WORKED OUT THE DETAILS OF HOW TO KILL YOURSELF? DO YOU INTEND TO CARRY OUT THIS PLAN?: YES
4. HAVE YOU HAD THESE THOUGHTS AND HAD SOME INTENTION OF ACTING ON THEM?: YES

## 2025-06-07 ASSESSMENT — PAIN - FUNCTIONAL ASSESSMENT
PAIN_FUNCTIONAL_ASSESSMENT: 0-10
PAIN_FUNCTIONAL_ASSESSMENT: 0-10

## 2025-06-07 ASSESSMENT — PAIN DESCRIPTION - LOCATION: LOCATION: CHEST

## 2025-06-07 ASSESSMENT — PAIN SCALES - GENERAL
PAINLEVEL_OUTOF10: 4
PAINLEVEL_OUTOF10: 3

## 2025-06-07 NOTE — PROGRESS NOTES
"Occupational Therapy     REHAB Therapy Group Treatment P.M.    Patient Name: Denice Wheat  MRN: [id number]  Today's Date: 6/7/2025  Time In: 1310  Time Out: 1348  Treatment Time: 38 min     Activity Assessment:  Pt became overwhelmed /irritable leaving group during discussion of self esteem \"I hate myself\". Pt unable to be redirected into positive thoughts leaving group. No SI expressed .    OT Interventions group/1:1 : Therapeutic use of self/activities, OT Task Skills Group, OT Life Skills Management Skills, Grief Management/Anger Management ,  ADL/I ADL  , Positive Coping Skills/Stress Management, Community Re-Entry, Relaxation, Self Esteem, Communication Skills, Time Management Skills, Addiction education/prevention        Attendance:  Attendance  Activity: Discussion/reminisce  Participation: Active participation    Therapeutic Recreation:  Treatment Approach  Approach : Group therapy sessions  Patient Stated Goals: None- pt states she feels too badly about herself to state goals  Cognition: Attention, Directions  Social Skills: Cooperates with others in group activity  Emotional: Mood, Behaviors  Stress Management/Relaxation Training: Identifies benefits of stress management/relaxation techniques  Treatment Approach Comments: Pt completed OT group education in Life role balance/ Positive coping skills OT group part II,. Pt identified the areas of health care/self care and financial health of areas of needing self improvement,seemingly increased active participation. As discussion moved  to emotions/self esteem patient became overwhelmed stating \"I hate myself\"  with being unable to be redirected leaving group. Prior to self esteem/emotions tpic, Pt increased verbal participation but declined active participation with coloring areas requiring self focus.      Encounter Problems       Encounter Problems (Active)       OT Goals       OT Goal 1 (Progressing)       Start:  05/29/25    Expected End:  06/26/25       " Communication/Interaction Skills (Self-expression/social Behavior/assertive)  Explore/demonstrate 1-2 effective methods of expressing feelings/wants/needs (can include assertion/engaging/sharing/asking) prior to discharge          OT Goal 2 (Progressing)       Start:  05/29/25    Expected End:  06/26/25       Coping Skills  Explore/identify 1-2 effective strategies of expressing feelings, wants, needs(can include assertion, engaging, sharing, asking) prior to discharge          OT Goal 3 (Progressing)       Start:  05/29/25    Expected End:  06/26/25        Emotion Regulation/self control  Pt will exhibit appropriate range, regulation of emotions, impulse control, frustration tolerance in OT groups prior to discharge.                    Education Documentation  Handouts, taught by Sarah Domínguez OT at 6/7/2025  7:05 PM.  Learner: Patient  Readiness: Acceptance  Method: Demonstration  Response: Demonstrated Understanding, Needs Reinforcement    Handouts, taught by Sarah Domínguez OT at 6/7/2025  5:07 PM.  Learner: Patient  Readiness: Acceptance  Method: Demonstration  Response: Demonstrated Understanding, Needs Reinforcement    Education Comments  No comments found.          Additional Comments:

## 2025-06-07 NOTE — PROGRESS NOTES
"Occupational Therapy     REHAB Therapy Group Treatment (with roommate P.M._)    Patient Name: Denice Wheat  MRN: [id number]  Today's Date: 6/7/2025  Time In: 1411  Time Out: 1435  Treatment Time: 24 min     Activity Assessment:     Pt demonstrated good receptiveness to discussion on self esteem and application to positive coping skills to mental health . No SI was verbalized during intervention.     OT Interventions group/1:1 : Therapeutic use of self/activities, OT Task Skills Group, OT Life Skills Management Skills, Grief Management/Anger Management ,  ADL/I ADL  , Positive Coping Skills/Stress Management, Community Re-Entry, Relaxation, Self Esteem, Communication Skills, Time Management Skills, Addiction education/prevention     Attendance:  Attendance  Activity: Discussion/reminisce  Participation: Active participation    Therapeutic Recreation:  Treatment Approach  Approach : Group therapy sessions  Patient Stated Goals: None- pt states she feels too badly about herself to state goals  Cognition: Attention, Directions  Social Skills: Cooperates with others in group activity  Emotional: Mood, Behaviors  Stress Management/Relaxation Training: Identifies benefits of stress management/relaxation techniques  Treatment Approach Comments:Pt receptive to OT intervention stating that it was her \"own fault\" for leaving group and not OT therapist. Pt identified that self esteem was an uncomfortable topic for her and all the groups keep covering it. Pt was educated in the relationship of healthy self esteem and mental wellness. Pt was receptive to being educated in the healthy building blocks of self esteem and importance of positive self talk, elimination of idea of \"perfectionism\" within herself and elimination of holding self to unattainable standards and acceptance of mistakes without fault/blame to move forward in lifestyle to assist with promoting self love/acceptance. Pt demonstrated good comprehension of " acceptance all instruction .      Encounter Problems       Encounter Problems (Active)       OT Goals       OT Goal 1 (Progressing)       Start:  05/29/25    Expected End:  06/26/25       Communication/Interaction Skills (Self-expression/social Behavior/assertive)  Explore/demonstrate 1-2 effective methods of expressing feelings/wants/needs (can include assertion/engaging/sharing/asking) prior to discharge          OT Goal 2 (Progressing)       Start:  05/29/25    Expected End:  06/26/25       Coping Skills  Explore/identify 1-2 effective strategies of expressing feelings, wants, needs(can include assertion, engaging, sharing, asking) prior to discharge          OT Goal 3 (Progressing)       Start:  05/29/25    Expected End:  06/26/25        Emotion Regulation/self control  Pt will exhibit appropriate range, regulation of emotions, impulse control, frustration tolerance in OT groups prior to discharge.                    Education Documentation  Handouts, taught by Sarah Domínguez OT at 6/7/2025  7:05 PM.  Learner: Patient  Readiness: Acceptance  Method: Demonstration  Response: Demonstrated Understanding, Needs Reinforcement    Handouts, taught by Sarah Domínguez OT at 6/7/2025  5:07 PM.  Learner: Patient  Readiness: Acceptance  Method: Demonstration  Response: Demonstrated Understanding, Needs Reinforcement    Education Comments  No comments found.          Additional Comments:

## 2025-06-07 NOTE — CARE PLAN
"The patient's goals for the shift include \"get through the day\"    The clinical goals for the shift include Patient will attend at least one group    Over the shift, the patient did make progress toward the following goals. Patient was cooperative throughout the shift. She denies any SI, HI or hallucinations at this time. She was out of her room on and off throughout the shift and attended group. She ate all three meals in the day room. She was compliant with her medication and declined any need for PRN's. She was noted to be tearful in the morning but her mood improved throughout the shift. She declined taking a shower. She had a visit with her daughter and was noted to be smiling and laughing during the visit. Nursing will continue to monitor and encourage.   "

## 2025-06-07 NOTE — PROGRESS NOTES
Denice Wheat is a 44 y.o. female on day 10 of admission had a rough evening due to the severe chaos on the unit yesterday.  She as a result did not sleep well last night and kept having vivid dreams and felt like she was tossing and turning and aware of sleeping last night.  She is denying any active suicidal ideations intent or plan although depression is still present.  Latuda dose adjustment discussed.  She had done well with very sporadic use of Lunesta but with her going to [facility] that will not be an option for insomnia.  Trazodone caused very severe vivid dreams and Remeron caused prolonged sedation.  I discussed doxepin and Elavil but after I discussed the risk of overdose associated torsade de pointes and cardiac arrest she was not comfortable taking it.  I offered her sleep hygiene measures but she could also so take the diphenhydramine at 25 mg as needed  MSE: Patient was alert, oriented to time, place, person and situation. Patient appears well groomed and clad in climate appropriate clothes. Patient is resigned on approach. Recent and remote memory within normal limits. Memory registration and recall within normal limits. Attention and concentration restricted. Speech normal in rate, rhythm and volume. Fair eye contact. Thought process Linear. Intact associations. Good fund of knowledge. Mood sad and affect constricted. Patient did not endorse any delusions. Patient denied any auditory visual hallucinations. Patient has denied any active suicidal  ideations and is future oriented.  Patient has good insight, good judgment and good impulse control.     Musculoskeletal: Normal gait, no Parkinsonism, no Dystonia, no Akathisia, no TD. Psychomotor activity within normal limits.  Increase Latuda 80 mg in the evening  Continue current treatment discussed Depakote levels with her    Last Recorded Vitals  /63   Pulse 63   Temp 36.7 °C (98.1 °F)   Resp 18   SpO2 99%      No results found for this  or any previous visit (from the past 24 hours).     Current Medications[1]       Florin Villa MD         [1]   Current Facility-Administered Medications:     acetaminophen (Tylenol) tablet 650 mg, 650 mg, oral, q6h PRN, SANIA Church, 650 mg at 05/31/25 0642    alum-mag hydroxide-simeth (Mylanta) 200-200-20 mg/5 mL oral suspension 10 mL, 10 mL, oral, 4x daily PRN, SANIA Church    diphenhydrAMINE (Sominex) tablet 25 mg, 25 mg, oral, Nightly PRN, Florin Villa MD    divalproex (Depakote) delayed release tablet 500 mg, 500 mg, oral, q12h COSME, SANIA Church, 500 mg at 06/07/25 0628    hydrOXYzine HCL (Atarax) tablet 50 mg, 50 mg, oral, q6h PRN, SANIA Church, 50 mg at 06/06/25 1751    ibuprofen tablet 600 mg, 600 mg, oral, q8h PRN, Florin Villa MD    lithium capsule 150 mg, 150 mg, oral, Nightly, Florin Villa MD, 150 mg at 06/06/25 2110    lurasidone (Latuda) tablet 80 mg, 80 mg, oral, Daily with evening meal, Florin Villa MD    OLANZapine (ZyPREXA) injection 5 mg, 5 mg, intramuscular, q6h PRN, SANIA Church    OLANZapine (ZyPREXA) tablet 5 mg, 5 mg, oral, q6h PRN, SANIA Church    pantoprazole (ProtoNix) EC tablet 40 mg, 40 mg, oral, Daily before breakfast, SANIA Church, 40 mg at 06/07/25 0628    prazosin (Minipress) capsule 2 mg, 2 mg, oral, q AM, Florin Villa MD, 2 mg at 06/06/25 0828    prazosin (Minipress) capsule 5 mg, 5 mg, oral, Nightly, Florin Villa MD, 5 mg at 06/06/25 2109    traMADol (Ultram) tablet 50 mg, 50 mg, oral, BID, Florin Villa MD, 50 mg at 06/06/25 2110

## 2025-06-07 NOTE — PROGRESS NOTES
Occupational Therapy     REHAB Therapy Group Treatment [facility]    Patient Name: Denice Wheat  MRN: [id number]  Today's Date: 6/7/2025  Time In: 1023  Time Out: 1123  Treatment Time: 60 min     Activity Assessment:   Pt attended OT group without prompting but demonstrated poor initiation with actively participating. Eye contact remained good without  SI/alterd reality orientation     OT Interventions group/1:1 : Therapeutic use of self/activities, OT Task Skills Group, OT Life Skills Management Skills, Grief Management/Anger Management ,  ADL/I ADL  , Positive Coping Skills/Stress Management, Community Re-Entry, Relaxation, Self Esteem, Communication Skills, Time Management Skills, Addiction education/prevention     Attendance:  Attendance  Activity: Discussion/reminisce  Participation: Active participation    Therapeutic Recreation:  Treatment Approach  Approach : Group therapy sessions  Patient Stated Goals: None- pt states she feels too badly about herself to state goals  Cognition: Directions, Attention  Social Skills: Cooperates with others in group activity  Emotional: Mood, Behaviors  Stress Management/Relaxation Training: Identifies benefits of stress management/relaxation techniques  Treatment Approach Comments: Pt demonstrated good group focus/comprehension of life role management in different vectors influencing perception, self esteem, stress levels and communication styles .Pt completed OT group education in Life role balance/ Positive coping skills OT group part I, Pt identified the areas of nutrition/diet,exercise/fitness,sleep management and stress mastery in needed areas of focus. Pt demonstrated passive initiation in group attendance and participation. Eye contact remans good, but pt demonstrates an overall flat affect. Poor initation with coloring areas needing attention in lifestyle when experiencing stressors/mental health decline.No SI was verbalized during intervetnion this date.      Encounter  Problems       Encounter Problems (Active)       OT Goals       OT Goal 1 (Progressing)       Start:  05/29/25    Expected End:  06/26/25       Communication/Interaction Skills (Self-expression/social Behavior/assertive)  Explore/demonstrate 1-2 effective methods of expressing feelings/wants/needs (can include assertion/engaging/sharing/asking) prior to discharge          OT Goal 2 (Progressing)       Start:  05/29/25    Expected End:  06/26/25       Coping Skills  Explore/identify 1-2 effective strategies of expressing feelings, wants, needs(can include assertion, engaging, sharing, asking) prior to discharge          OT Goal 3 (Progressing)       Start:  05/29/25    Expected End:  06/26/25        Emotion Regulation/self control  Pt will exhibit appropriate range, regulation of emotions, impulse control, frustration tolerance in OT groups prior to discharge.                    Education Documentation  Handouts, taught by Sarah Domínguez, OT at 6/7/2025  5:07 PM.  Learner: Patient  Readiness: Acceptance  Method: Demonstration  Response: Demonstrated Understanding, Needs Reinforcement    Education Comments  No comments found.          Additional Comments:

## 2025-06-07 NOTE — CARE PLAN
Problem: Pain - Adult  Goal: Verbalizes/displays adequate comfort level or baseline comfort level  Outcome: Progressing     Problem: Infection - Adult  Goal: Absence of infection at discharge  Outcome: Progressing  Goal: Absence of infection during hospitalization  Outcome: Progressing  Goal: Absence of fever/infection during anticipated neutropenic period  Outcome: Progressing     Problem: Safety - Adult  Goal: Free from fall injury  Outcome: Progressing     Problem: Discharge Planning  Goal: Discharge to home or other facility with appropriate resources  Outcome: Progressing     Problem: Chronic Conditions and Co-morbidities  Goal: Patient's chronic conditions and co-morbidity symptoms are monitored and maintained or improved  Outcome: Progressing     Problem: Nutrition  Goal: Nutrient intake appropriate for maintaining nutritional needs  Outcome: Progressing     Problem: Altered Thought Processes as Evidenced by  Goal: STG - Desires improvement in ability to think and concentrate  Outcome: Progressing  Goal: STG - Participates in Occupational Therapy and other cognitive assessments  Outcome: Progressing     Problem: Potential for Harm to Self or Others  Goal: Cooperates with admission process  Outcome: Progressing  Goal: Participates in unit activities  Outcome: Progressing  Goal: Patient/Family participate in treatment and discharge plans  Outcome: Progressing  Goal: Identifies deescalation techniques  Outcome: Progressing  Goal: Understands least restrictive measures  Outcome: Progressing  Goal: Identifies stressors that lead to harmful behaviors  Outcome: Progressing  Goal: Notifies staff when experiencing harmful thoughts toward self/others  Outcome: Progressing  Goal: Denies harm toward self or others  Outcome: Progressing  Goal: Free from restraint events  Outcome: Progressing     Problem: Educational/Scholastic Disruption  Goal: Meets educational requirements during hospitalization  Outcome:  Progressing  Goal: Attends class without disruptive behavior  Outcome: Progressing  Goal: Completes daily assignments  Outcome: Progressing     Problem: Ineffective Coping  Goal: Identifies ineffective coping skills  Outcome: Progressing  Goal: Identifies healthy coping skills  Outcome: Progressing  Goal: Demonstrates healthy coping skills  Outcome: Progressing  Goal: Participates in unit activities  Outcome: Progressing  Goal: Patient/Family participate in treatment and discharge plans  Outcome: Progressing  Goal: Patient/Family verbalizes awareness of resources  Outcome: Progressing  Goal: Understands least restrictive measures  Outcome: Progressing  Goal: Free from restraint events  Outcome: Progressing     Problem: Alteration in Sleep  Goal: STG - Reports nightly sleep, duration, and quality  Outcome: Progressing  Goal: STG - Identifies sleep hygiene aids  Outcome: Progressing  Goal: STG - Informs staff if unable to sleep  Outcome: Progressing  Goal: STG - Attends breathing and relaxation group  Outcome: Progressing     Problem: Potential for Substance Withdrawal  Goal: Verbalizes signs/symptoms of withdrawal  Outcome: Progressing  Goal: Reports signs/symptoms of withdrawal  Outcome: Progressing  Goal: Free of withdrawal symptoms  Outcome: Progressing     Problem: Anxiety  Goal: Patient/family understands admission protocols  Outcome: Progressing  Goal: Attempts to manage anxiety with help  Outcome: Progressing  Goal: Verbalizes ways to manage anxiety  Outcome: Progressing  Goal: Implements measures to reduce anxiety  Outcome: Progressing  Goal: Free from restraint events  Outcome: Progressing     Problem: Self Care Deficit  Goal: STG - Patient completes hygiene  Outcome: Progressing  Goal: Increase group attendance  Outcome: Progressing  Goal: Accepts need for medications  Outcome: Progressing  Goal: STG - Goes to and eats meals independently in dining room 100% of time  Outcome: Progressing     Problem:  Defensive Coping  Goal: Identifies reckless/dangerous behavior  Outcome: Progressing  Goal: Identifies stressors that lead to reckless/dangerous behavior  Outcome: Progressing  Goal: Discusses and identifies healthy coping skills  Outcome: Progressing  Goal: Demonstrates healthy coping skills  Outcome: Progressing  Goal: Identifies appropriate social interaction  Outcome: Progressing  Goal: Demonstrates appropriate social interactions  Outcome: Progressing  Goal: Patient/Family verbalizes awareness of resources  Outcome: Progressing  Goal: Discusses signs/symptoms of illness/treatment options  Outcome: Progressing  Goal: Patient/Family participate in treatment and discharge plans  Outcome: Progressing  Goal: Understands least restrictive measures  Outcome: Progressing  Goal: Free from restraint events  Outcome: Progressing     Problem: Risk for Suicide  Goal: Accepts medications as prescribed/needed this shift  Outcome: Progressing  Goal: Identifies supports this shift  Outcome: Progressing  Goal: Makes needs known through verbalization or behaviors this shift  Outcome: Progressing  Goal: No self harm this shift  Outcome: Progressing  Goal: Read Safety Guidelines this shift  Outcome: Progressing  Goal: Complete Mental Health Safety Plan (psychiatry only) this shift  Outcome: Progressing   The patient's goals for the shift include sleep    The clinical goals for the shift include sleep    Pt has been visible on the unit and social with peers and ate a snack. Pt currently denies suicidal and homicidal ideation and denies auditory and visual hallucinations. Pt affect is flat and mood is anxious and depressed and pt rates both 5(1-10) scale with 10 being the worst. Pt took bed time meds and will continue to monitor.

## 2025-06-08 VITALS
HEART RATE: 60 BPM | RESPIRATION RATE: 18 BRPM | SYSTOLIC BLOOD PRESSURE: 111 MMHG | TEMPERATURE: 98.2 F | OXYGEN SATURATION: 96 % | DIASTOLIC BLOOD PRESSURE: 70 MMHG

## 2025-06-08 PROCEDURE — 97150 GROUP THERAPEUTIC PROCEDURES: CPT | Mod: GO

## 2025-06-08 PROCEDURE — 2500000001 HC RX 250 WO HCPCS SELF ADMINISTERED DRUGS (ALT 637 FOR MEDICARE OP): Performed by: PSYCHIATRY & NEUROLOGY

## 2025-06-08 PROCEDURE — 1240000001 HC SEMI-PRIVATE BH ROOM DAILY

## 2025-06-08 PROCEDURE — 2500000001 HC RX 250 WO HCPCS SELF ADMINISTERED DRUGS (ALT 637 FOR MEDICARE OP): Performed by: REGISTERED NURSE

## 2025-06-08 PROCEDURE — 99232 SBSQ HOSP IP/OBS MODERATE 35: CPT | Performed by: PSYCHIATRY & NEUROLOGY

## 2025-06-08 RX ORDER — PRAZOSIN HYDROCHLORIDE 1 MG/1
6 CAPSULE ORAL NIGHTLY
Status: DISPENSED | OUTPATIENT
Start: 2025-06-08

## 2025-06-08 RX ADMIN — DIVALPROEX SODIUM 500 MG: 250 TABLET, DELAYED RELEASE ORAL at 06:17

## 2025-06-08 RX ADMIN — PRAZOSIN HYDROCHLORIDE 6 MG: 1 CAPSULE ORAL at 20:18

## 2025-06-08 RX ADMIN — LITHIUM CARBONATE 150 MG: 150 CAPSULE, GELATIN COATED ORAL at 20:18

## 2025-06-08 RX ADMIN — PANTOPRAZOLE SODIUM 40 MG: 40 TABLET, DELAYED RELEASE ORAL at 06:17

## 2025-06-08 RX ADMIN — PRAZOSIN HYDROCHLORIDE 2 MG: 1 CAPSULE ORAL at 08:29

## 2025-06-08 RX ADMIN — LURASIDONE HYDROCHLORIDE 80 MG: 40 TABLET, FILM COATED ORAL at 18:07

## 2025-06-08 RX ADMIN — DIVALPROEX SODIUM 500 MG: 250 TABLET, DELAYED RELEASE ORAL at 18:07

## 2025-06-08 RX ADMIN — DIPHENHYDRAMINE HCL 25 MG: 25 TABLET, COATED ORAL at 20:18

## 2025-06-08 ASSESSMENT — COLUMBIA-SUICIDE SEVERITY RATING SCALE - C-SSRS
1. IN THE PAST MONTH, HAVE YOU WISHED YOU WERE DEAD OR WISHED YOU COULD GO TO SLEEP AND NOT WAKE UP?: YES
5. HAVE YOU STARTED TO WORK OUT OR WORKED OUT THE DETAILS OF HOW TO KILL YOURSELF? DO YOU INTEND TO CARRY OUT THIS PLAN?: YES
6. HAVE YOU EVER DONE ANYTHING, STARTED TO DO ANYTHING, OR PREPARED TO DO ANYTHING TO END YOUR LIFE?: PT DENIES HAVING SUICIDAL THOUGHTS AT THIS TIME.
4. HAVE YOU HAD THESE THOUGHTS AND HAD SOME INTENTION OF ACTING ON THEM?: YES
6. HAVE YOU EVER DONE ANYTHING, STARTED TO DO ANYTHING, OR PREPARED TO DO ANYTHING TO END YOUR LIFE?: YES
5. HAVE YOU STARTED TO WORK OUT OR WORKED OUT THE DETAILS OF HOW TO KILL YOURSELF? DO YOU INTEND TO CARRY OUT THIS PLAN?: YES
2. HAVE YOU ACTUALLY HAD ANY THOUGHTS OF KILLING YOURSELF?: YES
4. HAVE YOU HAD THESE THOUGHTS AND HAD SOME INTENTION OF ACTING ON THEM?: YES
6. HAVE YOU EVER DONE ANYTHING, STARTED TO DO ANYTHING, OR PREPARED TO DO ANYTHING TO END YOUR LIFE?: YES
2. HAVE YOU ACTUALLY HAD ANY THOUGHTS OF KILLING YOURSELF?: YES
6. HAVE YOU EVER DONE ANYTHING, STARTED TO DO ANYTHING, OR PREPARED TO DO ANYTHING TO END YOUR LIFE?: YES
6. HAVE YOU EVER DONE ANYTHING, STARTED TO DO ANYTHING, OR PREPARED TO DO ANYTHING TO END YOUR LIFE?: PATIENT DENIES ANY THOUGHTS OF SI AT THIS TIME
1. IN THE PAST MONTH, HAVE YOU WISHED YOU WERE DEAD OR WISHED YOU COULD GO TO SLEEP AND NOT WAKE UP?: YES
6. HAVE YOU EVER DONE ANYTHING, STARTED TO DO ANYTHING, OR PREPARED TO DO ANYTHING TO END YOUR LIFE?: YES

## 2025-06-08 ASSESSMENT — PAIN SCALES - GENERAL
PAINLEVEL_OUTOF10: 0 - NO PAIN
PAINLEVEL_OUTOF10: 0 - NO PAIN

## 2025-06-08 ASSESSMENT — PAIN - FUNCTIONAL ASSESSMENT
PAIN_FUNCTIONAL_ASSESSMENT: 0-10
PAIN_FUNCTIONAL_ASSESSMENT: 0-10

## 2025-06-08 NOTE — CARE PLAN
The patient's goals for the shift include Stay positive    The clinical goals for the shift include Patient will attend at least one group    Over the shift, the patient did make progress toward the following goals. Patient was cooperative throughout the shift. She was out of her room and in the day room until after lunch. She ate all three meals, attended group, and was complaint with her medication. She denies any SI, HI or hallucinations. Nursing will continue to monitor and encourage.    What Is The Reason For Today's Visit?: Full Body Skin Examination What Is The Reason For Today's Visit? (Being Monitored For X): the development of new lesions normal...

## 2025-06-08 NOTE — PROGRESS NOTES
Denice Wheat is a 44 y.o. female on day 11 of admission described mood as depressed today.  She had a vivid dreams dream analysis done with the patient.  Psychoeducation active listening counseling provided  Adjustment of prazosin discussed but she was encouraged to maintain hydration and also discussed her blood pressure which has been on the lower range of normal level  MSE: Patient was alert, oriented to time, place, person and situation. Patient appears well groomed and clad in climate appropriate clothes. Patient is resigned on approach. Recent and remote memory within normal limits. Memory registration and recall within normal limits. Attention and concentration restricted. Speech normal in rate, rhythm and volume. Fair eye contact. Thought process Linear. Intact associations. Good fund of knowledge. Mood sad and affect constricted. Patient did not endorse any delusions. Patient denied any auditory visual hallucinations. Patient has denied any active suicidal  ideations and is future oriented.  Patient has good insight, good judgment and good impulse control.      Musculoskeletal: Normal gait, no Parkinsonism, no Dystonia, no Akathisia, no TD. Psychomotor activity within normal limits.  Continue Latuda 80 mg in the evening  Increase prazosin 6 mg at bedtime      Last Recorded Vitals  /64   Pulse 72   Temp 36.8 °C (98.2 °F) (Temporal)   Resp 18   SpO2 97%      No results found for this or any previous visit (from the past 24 hours).     Current Medications[1]       Florin Villa MD         [1]   Current Facility-Administered Medications:     acetaminophen (Tylenol) tablet 650 mg, 650 mg, oral, q6h PRN, GOKUL Church-CNP, 650 mg at 05/31/25 0642    alum-mag hydroxide-simeth (Mylanta) 200-200-20 mg/5 mL oral suspension 10 mL, 10 mL, oral, 4x daily PRN, GOKUL Church-CNP    diphenhydrAMINE (Sominex) tablet 25 mg, 25 mg, oral, Nightly PRN, Florin Villa MD    divalproex (Depakote)  delayed release tablet 500 mg, 500 mg, oral, q12h COSME, SANIA Church, 500 mg at 06/08/25 0617    hydrOXYzine HCL (Atarax) tablet 50 mg, 50 mg, oral, q6h PRN, SANIA Church, 50 mg at 06/06/25 1751    ibuprofen tablet 600 mg, 600 mg, oral, q8h PRN, Florin Villa MD    lithium capsule 150 mg, 150 mg, oral, Nightly, Florin Villa MD, 150 mg at 06/07/25 2114    lurasidone (Latuda) tablet 80 mg, 80 mg, oral, Daily with evening meal, Florin Villa MD, 80 mg at 06/07/25 1725    OLANZapine (ZyPREXA) injection 5 mg, 5 mg, intramuscular, q6h PRN, SANIA Church    OLANZapine (ZyPREXA) tablet 5 mg, 5 mg, oral, q6h PRN, SANIA Church    pantoprazole (ProtoNix) EC tablet 40 mg, 40 mg, oral, Daily before breakfast, SANIA Church, 40 mg at 06/08/25 0617    prazosin (Minipress) capsule 2 mg, 2 mg, oral, q AM, Florin Villa MD, 2 mg at 06/08/25 0829    prazosin (Minipress) capsule 6 mg, 6 mg, oral, Nightly, Florin Villa MD

## 2025-06-08 NOTE — PROGRESS NOTES
Occupational Therapy     REHAB Therapy Assessment & Treatment    Patient Name: Denice Wheat  MRN: [id number]  Today's Date: 6/8/2025      Activity:  Communication Styles Group     Attendance:  Attendance  Activity: Discussion/reminisce  Participation: Active participation    Treatment Approach  Approach : Group therapy sessions  Patient Stated Goals: none stated  Cognition: Attention, Directions (Pt alert, oriented, & attentive. Follows complex directions independently. Statements are linear, organized, on topic, & demonstrate improving insight)  Social Skills: Demonstrates ability to listen to others, Interacts independently in social activity  Emotional: Mood (Pt mood depressed, affect constricted)  Stress Management/Relaxation Training: Identifies benefits of stress management/relaxation techniques, Identifies difficulty adjusting to illness, hospitalization, or pain  Treatment Approach Comments: Pt attended & participated in morning therapy group focused on assertive communication skills. Pt educated in 4 primary styles of communication, including passive, aggressive, passive-aggressive, & assertive. Pt able to identify that they primarily use a passive communication style. Pt participated in discussion of how various non-assertive communication styles can negatively impact participation in meaningful roles & occupations, including relationships & mental health. Pt educated on assertive communication & why it is the most effective communication styles. Pt participated in group practice of identifying various communication styles in example statements, demonstrating good understanding. Finally, pt participated in group practice of creating assertive statements in response to example scenarios. Pt demonstrates good understanding overall.      Encounter Problems       Encounter Problems (Active)       OT Goals       OT Goal 1 (Progressing)       Start:  05/29/25    Expected End:  06/26/25        Communication/Interaction Skills (Self-expression/social Behavior/assertive)  Explore/demonstrate 1-2 effective methods of expressing feelings/wants/needs (can include assertion/engaging/sharing/asking) prior to discharge          OT Goal 2 (Progressing)       Start:  05/29/25    Expected End:  06/26/25       Coping Skills  Explore/identify 1-2 effective strategies of expressing feelings, wants, needs(can include assertion, engaging, sharing, asking) prior to discharge          OT Goal 3 (Progressing)       Start:  05/29/25    Expected End:  06/26/25        Emotion Regulation/self control  Pt will exhibit appropriate range, regulation of emotions, impulse control, frustration tolerance in OT groups prior to discharge.                Education:  Pt given educational handouts on communication styles & assertive communication. Reviewed, discussed, & practiced. Pt demonstrates good understanding.

## 2025-06-08 NOTE — CARE PLAN
The patient's goals for the shift include Pt states that she hopes she can sleep all night.    The clinical goals for the shift include Pt will sleep > 6 hours tonight.    Pt reports her mood is improving.  Pt denies SI, HI, and A/V hallucinations.  Pt affect is constricted.  Pt compliant with scheduled medications.    Pt appeared to sleep > 6 hours tonight.    Problem: Pain - Adult  Goal: Verbalizes/displays adequate comfort level or baseline comfort level  Outcome: Progressing     Problem: Safety - Adult  Goal: Free from fall injury  Outcome: Progressing     Problem: Potential for Harm to Self or Others  Goal: Identifies stressors that lead to harmful behaviors  Outcome: Progressing  Goal: Notifies staff when experiencing harmful thoughts toward self/others  Outcome: Progressing     Problem: Ineffective Coping  Goal: Identifies ineffective coping skills  Outcome: Progressing  Goal: Identifies healthy coping skills  Outcome: Progressing  Goal: Demonstrates healthy coping skills  Outcome: Progressing     Problem: Alteration in Sleep  Goal: STG - Reports nightly sleep, duration, and quality  Outcome: Progressing  Goal: STG - Identifies sleep hygiene aids  Outcome: Progressing  Goal: STG - Informs staff if unable to sleep  Outcome: Progressing     Problem: Anxiety  Goal: Attempts to manage anxiety with help  Outcome: Progressing  Goal: Verbalizes ways to manage anxiety  Outcome: Progressing  Goal: Implements measures to reduce anxiety  Outcome: Progressing     Problem: Self Care Deficit  Goal: STG - Patient completes hygiene  Outcome: Progressing     Problem: Defensive Coping  Goal: Identifies stressors that lead to reckless/dangerous behavior  Outcome: Progressing  Goal: Discusses and identifies healthy coping skills  Outcome: Progressing  Goal: Demonstrates healthy coping skills  Outcome: Progressing  Goal: Identifies appropriate social interaction  Outcome: Progressing  Goal: Demonstrates appropriate social  interactions  Outcome: Progressing     Problem: Risk for Suicide  Goal: Accepts medications as prescribed/needed this shift  Outcome: Progressing  Goal: Makes needs known through verbalization or behaviors this shift  Outcome: Progressing  Goal: No self harm this shift  Outcome: Progressing

## 2025-06-08 NOTE — PROGRESS NOTES
Occupational Therapy     REHAB Therapy Assessment & Treatment    Patient Name: Denice Wheat  MRN: [id number]  Today's Date: 6/8/2025      Activity:  Assertive Communication Skills Group    Attendance:  Attendance  Activity: Discussion/reminisce  Participation: Active participation    Treatment Approach  Approach : Group therapy sessions  Patient Stated Goals: none stated  Cognition: Attention, Directions (Pt alert, oriented, & attentive. Follows simple directions independently. Statements are linear, organized, on topic, & demonstrate improving insight.)  Social Skills: Demonstrates ability to listen to others, Interacts independently in social activity  Emotional: Mood (Pt mood depressed, affect constricted)  Stress Management/Relaxation Training: Identifies benefits of stress management/relaxation techniques, Identifies difficulty adjusting to illness, hospitalization, or pain  Treatment Approach Comments: Pt attended & participated in afternoon therapy group focused on assertive communication skills & boundaries. Began group by reviewing assertive communication education from this morning’s group. Pt participated by recalling & sharing the different communication styles. Then, pt educated on strategies to improve assertive communication within interpersonal relationships, including “I” statements, active listening, fair fighting, & apologizing. Pt participated in group practice of developing effective “I” statements in response to example scenarios, demonstrating good understanding. Next, pt educated on setting healthy boundaries & how this relates to assertive communication. Pt educated on boundary styles including porous, rigid, & healthy; & boundary types including physical, emotional, & material. Pt reflected & shared that their own boundaries tend to be more porous. Finally, pt educated on strategies for setting boundaries & participated in group practice of developing assertive boundary-setting statements  for example scenarios. Pt demonstrates good understanding.      Encounter Problems       Encounter Problems (Active)       OT Goals       OT Goal 1 (Progressing)       Start:  05/29/25    Expected End:  06/26/25       Communication/Interaction Skills (Self-expression/social Behavior/assertive)  Explore/demonstrate 1-2 effective methods of expressing feelings/wants/needs (can include assertion/engaging/sharing/asking) prior to discharge          OT Goal 2 (Progressing)       Start:  05/29/25    Expected End:  06/26/25       Coping Skills  Explore/identify 1-2 effective strategies of expressing feelings, wants, needs(can include assertion, engaging, sharing, asking) prior to discharge          OT Goal 3 (Progressing)       Start:  05/29/25    Expected End:  06/26/25        Emotion Regulation/self control  Pt will exhibit appropriate range, regulation of emotions, impulse control, frustration tolerance in OT groups prior to discharge.                Education:  Pt given educational handouts on conflict resolution, boundaries, & anger management. Reviewed & discussed. Pt demonstrates good understanding.

## 2025-06-09 PROCEDURE — 2500000001 HC RX 250 WO HCPCS SELF ADMINISTERED DRUGS (ALT 637 FOR MEDICARE OP): Performed by: PSYCHIATRY & NEUROLOGY

## 2025-06-09 PROCEDURE — 1140000001 HC PRIVATE PSYCH ROOM DAILY

## 2025-06-09 PROCEDURE — 2500000001 HC RX 250 WO HCPCS SELF ADMINISTERED DRUGS (ALT 637 FOR MEDICARE OP): Performed by: REGISTERED NURSE

## 2025-06-09 PROCEDURE — 97150 GROUP THERAPEUTIC PROCEDURES: CPT | Mod: GO

## 2025-06-09 PROCEDURE — RXMED WILLOW AMBULATORY MEDICATION CHARGE

## 2025-06-09 RX ORDER — PANTOPRAZOLE SODIUM 40 MG/1
40 TABLET, DELAYED RELEASE ORAL
Qty: 30 TABLET | Refills: 0 | Status: SHIPPED | OUTPATIENT
Start: 2025-06-10 | End: 2025-07-10

## 2025-06-09 RX ORDER — LURASIDONE HYDROCHLORIDE 80 MG/1
80 TABLET, FILM COATED ORAL
Qty: 30 TABLET | Refills: 0 | Status: SHIPPED | OUTPATIENT
Start: 2025-06-09 | End: 2025-07-10

## 2025-06-09 RX ORDER — PRAZOSIN HYDROCHLORIDE 2 MG/1
2 CAPSULE ORAL EVERY MORNING
Qty: 30 CAPSULE | Refills: 0 | Status: SHIPPED | OUTPATIENT
Start: 2025-06-10 | End: 2025-06-09 | Stop reason: ENTERED-IN-ERROR

## 2025-06-09 RX ORDER — PRAZOSIN HYDROCHLORIDE 2 MG/1
6 CAPSULE ORAL NIGHTLY
Qty: 90 CAPSULE | Refills: 0 | Status: SHIPPED | OUTPATIENT
Start: 2025-06-09 | End: 2025-06-09 | Stop reason: ENTERED-IN-ERROR

## 2025-06-09 RX ORDER — DIVALPROEX SODIUM 500 MG/1
500 TABLET, DELAYED RELEASE ORAL
Qty: 60 TABLET | Refills: 0 | Status: SHIPPED | OUTPATIENT
Start: 2025-06-09 | End: 2025-07-10

## 2025-06-09 RX ORDER — LITHIUM CARBONATE 150 MG/1
150 CAPSULE ORAL NIGHTLY
Qty: 30 CAPSULE | Refills: 0 | Status: SHIPPED | OUTPATIENT
Start: 2025-06-09 | End: 2025-07-10

## 2025-06-09 RX ADMIN — PRAZOSIN HYDROCHLORIDE 2 MG: 1 CAPSULE ORAL at 08:26

## 2025-06-09 RX ADMIN — LURASIDONE HYDROCHLORIDE 80 MG: 40 TABLET, FILM COATED ORAL at 17:29

## 2025-06-09 RX ADMIN — DIVALPROEX SODIUM 500 MG: 250 TABLET, DELAYED RELEASE ORAL at 17:30

## 2025-06-09 RX ADMIN — PRAZOSIN HYDROCHLORIDE 6 MG: 1 CAPSULE ORAL at 21:07

## 2025-06-09 RX ADMIN — DIVALPROEX SODIUM 500 MG: 250 TABLET, DELAYED RELEASE ORAL at 06:23

## 2025-06-09 RX ADMIN — PANTOPRAZOLE SODIUM 40 MG: 40 TABLET, DELAYED RELEASE ORAL at 06:23

## 2025-06-09 RX ADMIN — IBUPROFEN 600 MG: 600 TABLET ORAL at 16:29

## 2025-06-09 RX ADMIN — LITHIUM CARBONATE 150 MG: 150 CAPSULE, GELATIN COATED ORAL at 21:07

## 2025-06-09 ASSESSMENT — PAIN SCALES - GENERAL
PAINLEVEL_OUTOF10: 0 - NO PAIN
PAINLEVEL_OUTOF10: 3

## 2025-06-09 ASSESSMENT — PAIN DESCRIPTION - LOCATION: LOCATION: HEAD

## 2025-06-09 ASSESSMENT — PAIN - FUNCTIONAL ASSESSMENT
PAIN_FUNCTIONAL_ASSESSMENT: 0-10

## 2025-06-09 ASSESSMENT — COLUMBIA-SUICIDE SEVERITY RATING SCALE - C-SSRS
4. HAVE YOU HAD THESE THOUGHTS AND HAD SOME INTENTION OF ACTING ON THEM?: YES
6. HAVE YOU EVER DONE ANYTHING, STARTED TO DO ANYTHING, OR PREPARED TO DO ANYTHING TO END YOUR LIFE?: YES
5. HAVE YOU STARTED TO WORK OUT OR WORKED OUT THE DETAILS OF HOW TO KILL YOURSELF? DO YOU INTEND TO CARRY OUT THIS PLAN?: YES
6. HAVE YOU EVER DONE ANYTHING, STARTED TO DO ANYTHING, OR PREPARED TO DO ANYTHING TO END YOUR LIFE?: PATIENT DENIES ANY THOUGHTS OF SI AT THIS TIME
6. HAVE YOU EVER DONE ANYTHING, STARTED TO DO ANYTHING, OR PREPARED TO DO ANYTHING TO END YOUR LIFE?: YES
2. HAVE YOU ACTUALLY HAD ANY THOUGHTS OF KILLING YOURSELF?: YES
1. IN THE PAST MONTH, HAVE YOU WISHED YOU WERE DEAD OR WISHED YOU COULD GO TO SLEEP AND NOT WAKE UP?: YES

## 2025-06-09 NOTE — PROGRESS NOTES
Occupational Therapy     REHAB Therapy Assessment & Treatment    Patient Name: Denice Wheat  MRN: [id number]  Today's Date: 6/9/2025      Activity:  Stress Mgmt & Relaxation Skills Group    Attendance:  Attendance  Activity: Discussion/reminisce, Relaxation therapy  Participation: Active participation    Treatment Approach  Approach : Group therapy sessions  Patient Stated Goals: none stated  Cognition: Attention, Directions (Pt alert, oriented, & attentive. Follows simple directions independently. Statements are linear, organized, on topic, & demonstrate improving insight.)  Social Skills: Demonstrates ability to listen to others, Interacts independently in social activity  Emotional: Mood (Pt mood euthymic, hopeful, more energetic w/ brighter affect after learning she will likely discharge tomorrow.)  Stress Management/Relaxation Training: Identifies benefits of stress management/relaxation techniques, Identifies difficulty adjusting to illness, hospitalization, or pain  Treatment Approach Comments: Pt attended & participated in morning therapy group focused on stress management. Pt educated on stress, including definition, positive vs. negative stressors, & why stress management is crucial to mental wellness. Next, pt educated on common acute & chronic symptoms of stress. Pt also educated on 6 components of stress management, including balanced daily routine, assertive communication, wellness, time management, challenging irrational thoughts, & relaxation/positive coping. Pt participated in discussion about these components, stating she feels successful at time management & would most like to improve her assertive communication skills. Then, pt guided through completing “Stress Exploration” activity by identifying factors in their life that increase stress (including daily hassles, major changes, & life circumstances) and factors that protect against stress (daily uplifts, coping skills, protective factors). Pt  completed & shared daily uplifts, including time w/ her kids; & coping skills, including taking meds as prescribed & going to counseling. Pt educated on using the stress exploration to help identify where they can make the most meaningful changes in their stress management routines. Pt receptive. Finally, pt participated in practice of relaxation skills including deep breathing & progressive muscle relaxation. Pt educated on using relaxation skills to decrease the stress response. Pt demonstrates good understanding & motivation to apply skills.      Encounter Problems       Encounter Problems (Active)       OT Goals       OT Goal 1 (Progressing)       Start:  05/29/25    Expected End:  06/26/25       Communication/Interaction Skills (Self-expression/social Behavior/assertive)  Explore/demonstrate 1-2 effective methods of expressing feelings/wants/needs (can include assertion/engaging/sharing/asking) prior to discharge          OT Goal 2 (Progressing)       Start:  05/29/25    Expected End:  06/26/25       Coping Skills  Explore/identify 1-2 effective strategies of expressing feelings, wants, needs(can include assertion, engaging, sharing, asking) prior to discharge          OT Goal 3 (Progressing)       Start:  05/29/25    Expected End:  06/26/25        Emotion Regulation/self control  Pt will exhibit appropriate range, regulation of emotions, impulse control, frustration tolerance in OT groups prior to discharge.                Education:  Pt given educational handouts on stress management & relaxation skills. Reviewed, discussed, & practiced. Pt demonstrates good understanding.

## 2025-06-09 NOTE — GROUP NOTE
Group Topic: Goals   Group Date: 6/9/2025  Start Time: 1420  End Time: 1500  Facilitators: Fabián Rincon, RN   Department: [place] [address] [facility]    Number of Participants: 3   Group Focus: goals and nursing group  Treatment Modality: Patient-Centered Therapy  Interventions utilized were problem solving and support  Purpose: coping skills, feelings, and self-worth    Name: Denice Wheat YOB: 1980   MR: [id number]      Facilitator: Registered Nurse  Level of Participation: when cued  Quality of Participation: cooperative and quiet  Interactions with others: appropriate  Mood/Affect: anxious, appropriate, blunted, depressed, and tearful  Triggers (if applicable):   Cognition: coherent/clear  Progress: Gaining insight or knowledge  Comments:   Plan: continue with services

## 2025-06-09 NOTE — PROGRESS NOTES
[facility] reached out to [facility] to see if they can still accept pt with discharge  tomorrow. [facility] responded and said tomorrow works.     OMAR Villatoro
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
## 2025-06-09 NOTE — CARE PLAN
The patient's goals for the shift include Pt states she would like benadryl to help her sleep tonight.    The clinical goals for the shift include Pt will sleep > 6 hours tonight.        Problem: Safety - Adult  Goal: Free from fall injury  Outcome: Progressing     Problem: Potential for Harm to Self or Others  Goal: Identifies stressors that lead to harmful behaviors  Outcome: Progressing  Goal: Notifies staff when experiencing harmful thoughts toward self/others  Outcome: Progressing     Problem: Ineffective Coping  Goal: Identifies ineffective coping skills  Outcome: Progressing  Goal: Identifies healthy coping skills  Outcome: Progressing  Goal: Demonstrates healthy coping skills  Outcome: Progressing     Problem: Alteration in Sleep  Goal: STG - Reports nightly sleep, duration, and quality  Outcome: Progressing  Goal: STG - Informs staff if unable to sleep  Outcome: Progressing     Problem: Anxiety  Goal: Attempts to manage anxiety with help  Outcome: Progressing  Goal: Verbalizes ways to manage anxiety  Outcome: Progressing  Goal: Implements measures to reduce anxiety  Outcome: Progressing     Problem: Defensive Coping  Goal: Identifies reckless/dangerous behavior  Outcome: Progressing  Goal: Identifies stressors that lead to reckless/dangerous behavior  Outcome: Progressing  Goal: Discusses and identifies healthy coping skills  Outcome: Progressing  Goal: Demonstrates healthy coping skills  Outcome: Progressing  Goal: Identifies appropriate social interaction  Outcome: Progressing     Problem: Risk for Suicide  Goal: Accepts medications as prescribed/needed this shift  Outcome: Progressing  Goal: Makes needs known through verbalization or behaviors this shift  Outcome: Progressing  Goal: No self harm this shift  Outcome: Progressing     Problem: Alteration in Sleep  Goal: STG - Identifies sleep hygiene aids  Outcome: Met     Problem: Defensive Coping  Goal: Demonstrates appropriate social  interactions  Outcome: Met     Problem: Risk for Suicide  Goal: Identifies supports this shift  Outcome: Met
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
## 2025-06-10 ENCOUNTER — PHARMACY VISIT (OUTPATIENT)
Dept: PHARMACY | Facility: CLINIC | Age: 45
End: 2025-06-10
Payer: COMMERCIAL

## 2025-06-10 VITALS
HEART RATE: 79 BPM | WEIGHT: 200 LBS | BODY MASS INDEX: 31.39 KG/M2 | OXYGEN SATURATION: 97 % | TEMPERATURE: 98.1 F | HEIGHT: 67 IN | SYSTOLIC BLOOD PRESSURE: 111 MMHG | RESPIRATION RATE: 17 BRPM | DIASTOLIC BLOOD PRESSURE: 70 MMHG

## 2025-06-10 PROCEDURE — 2500000001 HC RX 250 WO HCPCS SELF ADMINISTERED DRUGS (ALT 637 FOR MEDICARE OP): Performed by: PSYCHIATRY & NEUROLOGY

## 2025-06-10 PROCEDURE — 2500000001 HC RX 250 WO HCPCS SELF ADMINISTERED DRUGS (ALT 637 FOR MEDICARE OP): Performed by: REGISTERED NURSE

## 2025-06-10 PROCEDURE — 99239 HOSP IP/OBS DSCHRG MGMT >30: CPT | Performed by: PSYCHIATRY & NEUROLOGY

## 2025-06-10 PROCEDURE — 97530 THERAPEUTIC ACTIVITIES: CPT | Mod: GO

## 2025-06-10 RX ADMIN — PANTOPRAZOLE SODIUM 40 MG: 40 TABLET, DELAYED RELEASE ORAL at 06:33

## 2025-06-10 RX ADMIN — PRAZOSIN HYDROCHLORIDE 2 MG: 1 CAPSULE ORAL at 08:40

## 2025-06-10 RX ADMIN — DIVALPROEX SODIUM 500 MG: 250 TABLET, DELAYED RELEASE ORAL at 06:33

## 2025-06-10 ASSESSMENT — COLUMBIA-SUICIDE SEVERITY RATING SCALE - C-SSRS
6. HAVE YOU EVER DONE ANYTHING, STARTED TO DO ANYTHING, OR PREPARED TO DO ANYTHING TO END YOUR LIFE?: PT DENIES SI AT THIS TIME.
5. HAVE YOU STARTED TO WORK OUT OR WORKED OUT THE DETAILS OF HOW TO KILL YOURSELF? DO YOU INTEND TO CARRY OUT THIS PLAN?: YES
6. HAVE YOU EVER DONE ANYTHING, STARTED TO DO ANYTHING, OR PREPARED TO DO ANYTHING TO END YOUR LIFE?: YES
6. HAVE YOU EVER DONE ANYTHING, STARTED TO DO ANYTHING, OR PREPARED TO DO ANYTHING TO END YOUR LIFE?: YES
2. HAVE YOU ACTUALLY HAD ANY THOUGHTS OF KILLING YOURSELF?: YES
4. HAVE YOU HAD THESE THOUGHTS AND HAD SOME INTENTION OF ACTING ON THEM?: YES
1. IN THE PAST MONTH, HAVE YOU WISHED YOU WERE DEAD OR WISHED YOU COULD GO TO SLEEP AND NOT WAKE UP?: YES

## 2025-06-10 ASSESSMENT — PAIN SCALES - GENERAL
PAINLEVEL_OUTOF10: 0 - NO PAIN
PAINLEVEL_OUTOF10: 0 - NO PAIN

## 2025-06-10 ASSESSMENT — PAIN - FUNCTIONAL ASSESSMENT: PAIN_FUNCTIONAL_ASSESSMENT: 0-10

## 2025-06-10 NOTE — NURSING NOTE
Patient has been present on unit. Discharge order obtained. Discharge instructions reviewed verbally and in writing including f/u appointments and medications. Patient verbalizes understanding. All belongings returned for discharge. Patient denies SI, HI, A/V hallucinations, mood is stable. Prescriptions provided via meds to beds program.  Roundtrip to [facility] arranged. To be escorted to ED entrance for discharge.

## 2025-06-10 NOTE — CARE PLAN
Problem: Potential for Harm to Self or Others  Goal: Participates in unit activities  Outcome: Progressing     Problem: Potential for Harm to Self or Others  Goal: Patient/Family participate in treatment and discharge plans  Outcome: Progressing     Problem: Potential for Harm to Self or Others  Goal: Identifies deescalation techniques  Outcome: Progressing     Problem: Potential for Harm to Self or Others  Goal: Understands least restrictive measures  Outcome: Progressing     Problem: Potential for Harm to Self or Others  Goal: Identifies stressors that lead to harmful behaviors  Outcome: Progressing     Problem: Potential for Harm to Self or Others  Goal: Notifies staff when experiencing harmful thoughts toward self/others  Outcome: Progressing     Problem: Potential for Harm to Self or Others  Goal: Free from restraint events  Outcome: Progressing     Problem: Ineffective Coping  Goal: Identifies healthy coping skills  Outcome: Progressing     Problem: Ineffective Coping  Goal: Patient/Family participate in treatment and discharge plans  Outcome: Progressing     Problem: Alteration in Sleep  Goal: STG - Reports nightly sleep, duration, and quality  Outcome: Progressing     Problem: Potential for Substance Withdrawal  Goal: Verbalizes signs/symptoms of withdrawal  Outcome: Progressing     Problem: Potential for Substance Withdrawal  Goal: Free of withdrawal symptoms  Outcome: Progressing     Problem: Anxiety  Goal: Attempts to manage anxiety with help  Outcome: Progressing     Problem: Self Care Deficit  Goal: STG - Goes to and eats meals independently in dining room 100% of time  Outcome: Progressing     Problem: Risk for Suicide  Goal: Makes needs known through verbalization or behaviors this shift  Outcome: Progressing     Problem: Risk for Suicide  Goal: Accepts medications as prescribed/needed this shift  Outcome: Progressing     Problem: Risk for Suicide  Goal: No self harm this shift  Outcome: Progressing        The patient's goals for the shift include pt wants to go to rehab    The clinical goals for the shift include pt malinda be active on unit    Over the shift, the patient did make progress toward the following goals.     Patient currently denies SI/HI and A/VH at this time. Patient reports her anxiety as a 1/10 and depression as a 3/10. Patient was medication compliant this shift. Patient did not receive any PRN's this shift. Patient showered this morning and put clean clothes on. Patient ate all of her meals in the dayroom with her peers. Patient is set for discharge to [facility] this afternoon. Patient is agreeable with this plan. Patient is able to make her needs known. Nursing will continue to monitor.

## 2025-06-10 NOTE — CARE PLAN
The patient's goals for the shift include sleep    The clinical goals for the shift include Patient will sleep at least 6 hours    Over the shift, the patient did make progress toward the following goals. Patient was cooperative throughout the shift. She was isolated to her room the entire shift. She denies any SI, HI or hallucinations. She was compliant with medication and denied any need for PRN's. She was hopeful about discharging today. Nursing will continue to monitor and encourage.
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
## 2025-08-01 ENCOUNTER — TRANSCRIBE ORDERS (OUTPATIENT)
Dept: ADMINISTRATIVE | Age: 45
End: 2025-08-01

## 2025-08-01 DIAGNOSIS — Z12.31 ENCOUNTER FOR SCREENING MAMMOGRAM FOR MALIGNANT NEOPLASM OF BREAST: Primary | ICD-10-CM

## 2025-08-06 ENCOUNTER — HOSPITAL ENCOUNTER (OUTPATIENT)
Dept: WOMENS IMAGING | Age: 45
Discharge: HOME | End: 2025-08-08
Payer: COMMERCIAL

## 2025-08-06 VITALS — WEIGHT: 200 LBS | BODY MASS INDEX: 31.32 KG/M2

## 2025-08-06 DIAGNOSIS — Z12.31 ENCOUNTER FOR SCREENING MAMMOGRAM FOR MALIGNANT NEOPLASM OF BREAST: ICD-10-CM

## 2025-08-06 PROCEDURE — 77063 BREAST TOMOSYNTHESIS BI: CPT
